# Patient Record
Sex: MALE | Race: OTHER | NOT HISPANIC OR LATINO | URBAN - METROPOLITAN AREA
[De-identification: names, ages, dates, MRNs, and addresses within clinical notes are randomized per-mention and may not be internally consistent; named-entity substitution may affect disease eponyms.]

---

## 2019-09-22 ENCOUNTER — INPATIENT (INPATIENT)
Facility: HOSPITAL | Age: 54
LOS: 0 days | Discharge: ANOTHER HOSPITAL | End: 2019-09-23
Attending: INTERNAL MEDICINE | Admitting: INTERNAL MEDICINE
Payer: MEDICAID

## 2019-09-22 VITALS
RESPIRATION RATE: 18 BRPM | WEIGHT: 244.93 LBS | SYSTOLIC BLOOD PRESSURE: 145 MMHG | HEART RATE: 120 BPM | HEIGHT: 67 IN | TEMPERATURE: 99 F | DIASTOLIC BLOOD PRESSURE: 85 MMHG

## 2019-09-22 LAB
ALBUMIN SERPL ELPH-MCNC: 3.5 G/DL — SIGNIFICANT CHANGE UP (ref 3.3–5)
ALP SERPL-CCNC: 51 U/L — SIGNIFICANT CHANGE UP (ref 40–120)
ALT FLD-CCNC: 21 U/L — SIGNIFICANT CHANGE UP (ref 12–78)
ANION GAP SERPL CALC-SCNC: 10 MMOL/L — SIGNIFICANT CHANGE UP (ref 5–17)
APTT BLD: 27.4 SEC — LOW (ref 28.5–37)
AST SERPL-CCNC: 14 U/L — LOW (ref 15–37)
BILIRUB SERPL-MCNC: 0.9 MG/DL — SIGNIFICANT CHANGE UP (ref 0.2–1.2)
BUN SERPL-MCNC: 18 MG/DL — SIGNIFICANT CHANGE UP (ref 7–23)
CALCIUM SERPL-MCNC: 8.8 MG/DL — SIGNIFICANT CHANGE UP (ref 8.5–10.1)
CHLORIDE SERPL-SCNC: 102 MMOL/L — SIGNIFICANT CHANGE UP (ref 96–108)
CO2 SERPL-SCNC: 26 MMOL/L — SIGNIFICANT CHANGE UP (ref 22–31)
CREAT SERPL-MCNC: 1.04 MG/DL — SIGNIFICANT CHANGE UP (ref 0.5–1.3)
ERYTHROCYTE [SEDIMENTATION RATE] IN BLOOD: 14 MM/HR — SIGNIFICANT CHANGE UP (ref 0–20)
GLUCOSE SERPL-MCNC: 281 MG/DL — HIGH (ref 70–99)
HCT VFR BLD CALC: 40.9 % — SIGNIFICANT CHANGE UP (ref 39–50)
HGB BLD-MCNC: 13.8 G/DL — SIGNIFICANT CHANGE UP (ref 13–17)
INR BLD: 1.22 RATIO — HIGH (ref 0.88–1.16)
LACTATE SERPL-SCNC: 2.5 MMOL/L — HIGH (ref 0.7–2)
MCHC RBC-ENTMCNC: 25.9 PG — LOW (ref 27–34)
MCHC RBC-ENTMCNC: 33.7 GM/DL — SIGNIFICANT CHANGE UP (ref 32–36)
MCV RBC AUTO: 76.7 FL — LOW (ref 80–100)
NRBC # BLD: 0 /100 WBCS — SIGNIFICANT CHANGE UP (ref 0–0)
PLATELET # BLD AUTO: 188 K/UL — SIGNIFICANT CHANGE UP (ref 150–400)
POTASSIUM SERPL-MCNC: 3.8 MMOL/L — SIGNIFICANT CHANGE UP (ref 3.5–5.3)
POTASSIUM SERPL-SCNC: 3.8 MMOL/L — SIGNIFICANT CHANGE UP (ref 3.5–5.3)
PROT SERPL-MCNC: 7.6 GM/DL — SIGNIFICANT CHANGE UP (ref 6–8.3)
PROTHROM AB SERPL-ACNC: 13.8 SEC — HIGH (ref 10–12.9)
RBC # BLD: 5.33 M/UL — SIGNIFICANT CHANGE UP (ref 4.2–5.8)
RBC # FLD: 15.4 % — HIGH (ref 10.3–14.5)
SODIUM SERPL-SCNC: 138 MMOL/L — SIGNIFICANT CHANGE UP (ref 135–145)
WBC # BLD: 18.05 K/UL — HIGH (ref 3.8–10.5)
WBC # FLD AUTO: 18.05 K/UL — HIGH (ref 3.8–10.5)

## 2019-09-22 PROCEDURE — 70491 CT SOFT TISSUE NECK W/DYE: CPT | Mod: 26

## 2019-09-22 PROCEDURE — 93010 ELECTROCARDIOGRAM REPORT: CPT

## 2019-09-22 PROCEDURE — 99285 EMERGENCY DEPT VISIT HI MDM: CPT

## 2019-09-22 PROCEDURE — 71045 X-RAY EXAM CHEST 1 VIEW: CPT | Mod: 26

## 2019-09-22 PROCEDURE — 70487 CT MAXILLOFACIAL W/DYE: CPT | Mod: 26

## 2019-09-22 RX ORDER — ACETAMINOPHEN 500 MG
975 TABLET ORAL ONCE
Refills: 0 | Status: COMPLETED | OUTPATIENT
Start: 2019-09-22 | End: 2019-09-22

## 2019-09-22 RX ORDER — AMPICILLIN SODIUM AND SULBACTAM SODIUM 250; 125 MG/ML; MG/ML
3 INJECTION, POWDER, FOR SUSPENSION INTRAMUSCULAR; INTRAVENOUS ONCE
Refills: 0 | Status: COMPLETED | OUTPATIENT
Start: 2019-09-22 | End: 2019-09-22

## 2019-09-22 RX ORDER — SODIUM CHLORIDE 9 MG/ML
1000 INJECTION INTRAMUSCULAR; INTRAVENOUS; SUBCUTANEOUS ONCE
Refills: 0 | Status: COMPLETED | OUTPATIENT
Start: 2019-09-22 | End: 2019-09-22

## 2019-09-22 RX ORDER — MORPHINE SULFATE 50 MG/1
4 CAPSULE, EXTENDED RELEASE ORAL ONCE
Refills: 0 | Status: DISCONTINUED | OUTPATIENT
Start: 2019-09-22 | End: 2019-09-22

## 2019-09-22 RX ADMIN — SODIUM CHLORIDE 1000 MILLILITER(S): 9 INJECTION INTRAMUSCULAR; INTRAVENOUS; SUBCUTANEOUS at 19:46

## 2019-09-22 RX ADMIN — MORPHINE SULFATE 4 MILLIGRAM(S): 50 CAPSULE, EXTENDED RELEASE ORAL at 21:50

## 2019-09-22 RX ADMIN — AMPICILLIN SODIUM AND SULBACTAM SODIUM 3 GRAM(S): 250; 125 INJECTION, POWDER, FOR SUSPENSION INTRAMUSCULAR; INTRAVENOUS at 23:55

## 2019-09-22 RX ADMIN — AMPICILLIN SODIUM AND SULBACTAM SODIUM 200 GRAM(S): 250; 125 INJECTION, POWDER, FOR SUSPENSION INTRAMUSCULAR; INTRAVENOUS at 21:50

## 2019-09-22 RX ADMIN — MORPHINE SULFATE 4 MILLIGRAM(S): 50 CAPSULE, EXTENDED RELEASE ORAL at 22:10

## 2019-09-22 RX ADMIN — Medication 975 MILLIGRAM(S): at 19:46

## 2019-09-22 RX ADMIN — Medication 975 MILLIGRAM(S): at 20:40

## 2019-09-22 RX ADMIN — SODIUM CHLORIDE 1000 MILLILITER(S): 9 INJECTION INTRAMUSCULAR; INTRAVENOUS; SUBCUTANEOUS at 21:50

## 2019-09-22 NOTE — ED ADULT TRIAGE NOTE - CHIEF COMPLAINT QUOTE
pt complaining of swelling to right side of throat and swelling since last night. pain radiating up right side of face. history of dm.

## 2019-09-22 NOTE — ED PROVIDER NOTE - PROGRESS NOTE DETAILS
pt. feel much better after meds, pain improved  peritonsillar abscess and cellulitis of neck noted on CT  will admit for continuation of IV antibiotics and IV pain control

## 2019-09-22 NOTE — ED PROVIDER NOTE - OBJECTIVE STATEMENT
53 yo M with R throat pain since last night, + fever.  Pt. has painful swallowing.  R side of throat and neck is swollen.  No inciting event/trauma.  No sick contacts.  No other complaints.  Pt. is breathing without issue.  ROS: negative for fever, cough, headache, chest pain, shortness of breath, abd pain, nausea, vomiting, diarrhea, rash, paresthesia, and weakness--all other systems reviewed are negative.   PMH: NIDDM; Meds: metformin; SH: Denies smoking/drinking/drug use

## 2019-09-22 NOTE — ED PROVIDER NOTE - CLINICAL SUMMARY MEDICAL DECISION MAKING FREE TEXT BOX
55 yo M with throat pain in adult, likely infections, ?soft tissue infection  -basic labs, coags, esr, crp, lactate, ebv serology, CT soft tissue neck and maxillo + contrast, cxr, ekg, IV, ns hydration, Unasyn, Tylenol/morphine prn  -f/u results, reeval

## 2019-09-22 NOTE — ED PROVIDER NOTE - PHYSICAL EXAMINATION
Vitals: tachy at 120, otherwise WNL  Gen: AAOx3, NAD, sitting comfortably in stretcher  ENT: patent airway   Head: ncat, perrla, eomi b/l, gross swelling of R neck under R jaw and R facial area, ttp+, no crepitus or woody induration of skin, mild erythema of area (15x20 cm area grossly)  Neck: supple, no lymphadenopathy, no midline deviation  Heart: rrr, no m/r/g  Lungs: CTA b/l, no rales/ronchi/wheezes  Abd: soft, nontender, non-distended, no rebound or guarding  Ext: no clubbing/cyanosis/edema  Neuro: sensation and muscle strength intact b/l, steady gait, CN2-12 intact b/l

## 2019-09-22 NOTE — ED PROVIDER NOTE - CARE PLAN
Principal Discharge DX:	Throat pain in adult Principal Discharge DX:	Cellulitis of neck  Secondary Diagnosis:	Peritonsillar abscess

## 2019-09-23 ENCOUNTER — INPATIENT (INPATIENT)
Facility: HOSPITAL | Age: 54
LOS: 9 days | Discharge: HOME HEALTH | End: 2019-10-03
Attending: INTERNAL MEDICINE | Admitting: INTERNAL MEDICINE
Payer: MEDICAID

## 2019-09-23 ENCOUNTER — TRANSCRIPTION ENCOUNTER (OUTPATIENT)
Age: 54
End: 2019-09-23

## 2019-09-23 VITALS
OXYGEN SATURATION: 96 % | DIASTOLIC BLOOD PRESSURE: 74 MMHG | SYSTOLIC BLOOD PRESSURE: 116 MMHG | RESPIRATION RATE: 21 BRPM | HEART RATE: 103 BPM | TEMPERATURE: 100 F

## 2019-09-23 VITALS
OXYGEN SATURATION: 93 % | RESPIRATION RATE: 18 BRPM | DIASTOLIC BLOOD PRESSURE: 74 MMHG | HEART RATE: 114 BPM | SYSTOLIC BLOOD PRESSURE: 111 MMHG | TEMPERATURE: 98 F

## 2019-09-23 DIAGNOSIS — L03.221 CELLULITIS OF NECK: ICD-10-CM

## 2019-09-23 DIAGNOSIS — Z29.9 ENCOUNTER FOR PROPHYLACTIC MEASURES, UNSPECIFIED: ICD-10-CM

## 2019-09-23 DIAGNOSIS — E11.9 TYPE 2 DIABETES MELLITUS WITHOUT COMPLICATIONS: ICD-10-CM

## 2019-09-23 DIAGNOSIS — J36 PERITONSILLAR ABSCESS: ICD-10-CM

## 2019-09-23 LAB
ALBUMIN SERPL ELPH-MCNC: 3.5 G/DL — SIGNIFICANT CHANGE UP (ref 3.3–5)
ALP SERPL-CCNC: 36 U/L — LOW (ref 40–120)
ALT FLD-CCNC: 12 U/L — SIGNIFICANT CHANGE UP (ref 4–41)
ANION GAP SERPL CALC-SCNC: 18 MMO/L — HIGH (ref 7–14)
ANISOCYTOSIS BLD QL: SLIGHT — SIGNIFICANT CHANGE UP
AST SERPL-CCNC: 14 U/L — SIGNIFICANT CHANGE UP (ref 4–40)
BASOPHILS # BLD AUTO: 0.06 K/UL — SIGNIFICANT CHANGE UP (ref 0–0.2)
BASOPHILS NFR BLD AUTO: 0.3 % — SIGNIFICANT CHANGE UP (ref 0–2)
BASOPHILS NFR SPEC: 0 % — SIGNIFICANT CHANGE UP (ref 0–2)
BILIRUB SERPL-MCNC: 0.8 MG/DL — SIGNIFICANT CHANGE UP (ref 0.2–1.2)
BLASTS # FLD: 0 % — SIGNIFICANT CHANGE UP (ref 0–0)
BUN SERPL-MCNC: 19 MG/DL — SIGNIFICANT CHANGE UP (ref 7–23)
CALCIUM SERPL-MCNC: 9 MG/DL — SIGNIFICANT CHANGE UP (ref 8.4–10.5)
CHLORIDE SERPL-SCNC: 101 MMOL/L — SIGNIFICANT CHANGE UP (ref 98–107)
CO2 SERPL-SCNC: 17 MMOL/L — LOW (ref 22–31)
CREAT SERPL-MCNC: 0.64 MG/DL — SIGNIFICANT CHANGE UP (ref 0.5–1.3)
CRP SERPL-MCNC: 10.73 MG/DL — HIGH (ref 0–0.4)
EBV EA AB SER IA-ACNC: 6.6 U/ML — SIGNIFICANT CHANGE UP
EBV EA AB TITR SER IF: POSITIVE
EBV EA IGG SER-ACNC: NEGATIVE — SIGNIFICANT CHANGE UP
EBV NA IGG SER IA-ACNC: 172 U/ML — HIGH
EBV PATRN SPEC IB-IMP: SIGNIFICANT CHANGE UP
EBV VCA IGG AVIDITY SER QL IA: POSITIVE
EBV VCA IGM SER IA-ACNC: 34.2 U/ML — HIGH
EBV VCA IGM SER IA-ACNC: <10 U/ML — SIGNIFICANT CHANGE UP
EBV VCA IGM TITR FLD: NEGATIVE — SIGNIFICANT CHANGE UP
EOSINOPHIL # BLD AUTO: 5.74 K/UL — HIGH (ref 0–0.5)
EOSINOPHIL NFR BLD AUTO: 30.3 % — HIGH (ref 0–6)
EOSINOPHIL NFR FLD: 0 % — SIGNIFICANT CHANGE UP (ref 0–6)
GLUCOSE SERPL-MCNC: 274 MG/DL — HIGH (ref 70–99)
HCT VFR BLD CALC: 38.7 % — LOW (ref 39–50)
HCV AB S/CO SERPL IA: 0.21 S/CO — SIGNIFICANT CHANGE UP (ref 0–0.99)
HCV AB SERPL-IMP: SIGNIFICANT CHANGE UP
HGB BLD-MCNC: 12.7 G/DL — LOW (ref 13–17)
HIV 1+2 AB+HIV1 P24 AG SERPL QL IA: SIGNIFICANT CHANGE UP
HYPOCHROMIA BLD QL: SLIGHT — SIGNIFICANT CHANGE UP
IMM GRANULOCYTES NFR BLD AUTO: 3.7 % — HIGH (ref 0–1.5)
LACTATE SERPL-SCNC: 1.9 MMOL/L — SIGNIFICANT CHANGE UP (ref 0.7–2)
LYMPHOCYTES # BLD AUTO: 1.3 K/UL — SIGNIFICANT CHANGE UP (ref 1–3.3)
LYMPHOCYTES # BLD AUTO: 6.9 % — LOW (ref 13–44)
LYMPHOCYTES NFR SPEC AUTO: 3.5 % — LOW (ref 13–44)
MCHC RBC-ENTMCNC: 25.1 PG — LOW (ref 27–34)
MCHC RBC-ENTMCNC: 32.8 % — SIGNIFICANT CHANGE UP (ref 32–36)
MCV RBC AUTO: 76.6 FL — LOW (ref 80–100)
METAMYELOCYTES # FLD: 3.5 % — HIGH (ref 0–1)
MICROCYTES BLD QL: SLIGHT — SIGNIFICANT CHANGE UP
MONOCYTES # BLD AUTO: 1.25 K/UL — HIGH (ref 0–0.9)
MONOCYTES NFR BLD AUTO: 6.6 % — SIGNIFICANT CHANGE UP (ref 2–14)
MONOCYTES NFR BLD: 5.2 % — SIGNIFICANT CHANGE UP (ref 2–9)
MYELOCYTES NFR BLD: 0 % — SIGNIFICANT CHANGE UP (ref 0–0)
NEUTROPHIL AB SER-ACNC: 76.5 % — SIGNIFICANT CHANGE UP (ref 43–77)
NEUTROPHILS # BLD AUTO: 9.9 K/UL — HIGH (ref 1.8–7.4)
NEUTROPHILS NFR BLD AUTO: 52.2 % — SIGNIFICANT CHANGE UP (ref 43–77)
NEUTS BAND # BLD: 9.6 % — HIGH (ref 0–6)
NRBC # FLD: 0 K/UL — SIGNIFICANT CHANGE UP (ref 0–0)
OTHER - HEMATOLOGY %: 0 — SIGNIFICANT CHANGE UP
PLATELET # BLD AUTO: 169 K/UL — SIGNIFICANT CHANGE UP (ref 150–400)
PLATELET COUNT - ESTIMATE: NORMAL — SIGNIFICANT CHANGE UP
PMV BLD: 10.8 FL — SIGNIFICANT CHANGE UP (ref 7–13)
POLYCHROMASIA BLD QL SMEAR: SLIGHT — SIGNIFICANT CHANGE UP
POTASSIUM SERPL-MCNC: 4.3 MMOL/L — SIGNIFICANT CHANGE UP (ref 3.5–5.3)
POTASSIUM SERPL-SCNC: 4.3 MMOL/L — SIGNIFICANT CHANGE UP (ref 3.5–5.3)
PROMYELOCYTES # FLD: 0 % — SIGNIFICANT CHANGE UP (ref 0–0)
PROT SERPL-MCNC: 7.1 G/DL — SIGNIFICANT CHANGE UP (ref 6–8.3)
RBC # BLD: 5.05 M/UL — SIGNIFICANT CHANGE UP (ref 4.2–5.8)
RBC # FLD: 15.5 % — HIGH (ref 10.3–14.5)
SODIUM SERPL-SCNC: 136 MMOL/L — SIGNIFICANT CHANGE UP (ref 135–145)
VARIANT LYMPHS # BLD: 1.7 % — SIGNIFICANT CHANGE UP
WBC # BLD: 18.95 K/UL — HIGH (ref 3.8–10.5)
WBC # FLD AUTO: 18.95 K/UL — HIGH (ref 3.8–10.5)

## 2019-09-23 PROCEDURE — 99223 1ST HOSP IP/OBS HIGH 75: CPT | Mod: AI

## 2019-09-23 PROCEDURE — 99238 HOSP IP/OBS DSCHRG MGMT 30/<: CPT

## 2019-09-23 PROCEDURE — 99221 1ST HOSP IP/OBS SF/LOW 40: CPT

## 2019-09-23 RX ORDER — DEXAMETHASONE 0.5 MG/5ML
2 ELIXIR ORAL EVERY 6 HOURS
Refills: 0 | Status: DISCONTINUED | OUTPATIENT
Start: 2019-09-23 | End: 2019-09-23

## 2019-09-23 RX ORDER — MORPHINE SULFATE 50 MG/1
4 CAPSULE, EXTENDED RELEASE ORAL EVERY 4 HOURS
Refills: 0 | Status: DISCONTINUED | OUTPATIENT
Start: 2019-09-23 | End: 2019-09-23

## 2019-09-23 RX ORDER — SODIUM CHLORIDE 9 MG/ML
1000 INJECTION INTRAMUSCULAR; INTRAVENOUS; SUBCUTANEOUS ONCE
Refills: 0 | Status: COMPLETED | OUTPATIENT
Start: 2019-09-23 | End: 2019-09-23

## 2019-09-23 RX ORDER — AMPICILLIN SODIUM AND SULBACTAM SODIUM 250; 125 MG/ML; MG/ML
3 INJECTION, POWDER, FOR SUSPENSION INTRAMUSCULAR; INTRAVENOUS EVERY 6 HOURS
Refills: 0 | Status: DISCONTINUED | OUTPATIENT
Start: 2019-09-23 | End: 2019-09-23

## 2019-09-23 RX ORDER — AMPICILLIN SODIUM AND SULBACTAM SODIUM 250; 125 MG/ML; MG/ML
3 INJECTION, POWDER, FOR SUSPENSION INTRAMUSCULAR; INTRAVENOUS ONCE
Refills: 0 | Status: COMPLETED | OUTPATIENT
Start: 2019-09-23 | End: 2019-09-23

## 2019-09-23 RX ORDER — DEXAMETHASONE 0.5 MG/5ML
10 ELIXIR ORAL ONCE
Refills: 0 | Status: COMPLETED | OUTPATIENT
Start: 2019-09-24 | End: 2019-09-24

## 2019-09-23 RX ORDER — DEXAMETHASONE 0.5 MG/5ML
4 ELIXIR ORAL ONCE
Refills: 0 | Status: DISCONTINUED | OUTPATIENT
Start: 2019-09-23 | End: 2019-09-23

## 2019-09-23 RX ORDER — MORPHINE SULFATE 50 MG/1
4 CAPSULE, EXTENDED RELEASE ORAL ONCE
Refills: 0 | Status: DISCONTINUED | OUTPATIENT
Start: 2019-09-23 | End: 2019-09-23

## 2019-09-23 RX ORDER — DEXAMETHASONE 0.5 MG/5ML
6 ELIXIR ORAL ONCE
Refills: 0 | Status: COMPLETED | OUTPATIENT
Start: 2019-09-23 | End: 2019-09-23

## 2019-09-23 RX ORDER — INFLUENZA VIRUS VACCINE 15; 15; 15; 15 UG/.5ML; UG/.5ML; UG/.5ML; UG/.5ML
0.5 SUSPENSION INTRAMUSCULAR ONCE
Refills: 0 | Status: DISCONTINUED | OUTPATIENT
Start: 2019-09-23 | End: 2019-09-23

## 2019-09-23 RX ORDER — VANCOMYCIN HCL 1 G
1000 VIAL (EA) INTRAVENOUS EVERY 8 HOURS
Refills: 0 | Status: DISCONTINUED | OUTPATIENT
Start: 2019-09-23 | End: 2019-09-23

## 2019-09-23 RX ORDER — DEXAMETHASONE 0.5 MG/5ML
4 ELIXIR ORAL ONCE
Refills: 0 | Status: COMPLETED | OUTPATIENT
Start: 2019-09-23 | End: 2019-09-23

## 2019-09-23 RX ORDER — CHLORHEXIDINE GLUCONATE 213 G/1000ML
15 SOLUTION TOPICAL
Refills: 0 | Status: DISCONTINUED | OUTPATIENT
Start: 2019-09-23 | End: 2019-09-23

## 2019-09-23 RX ADMIN — MORPHINE SULFATE 4 MILLIGRAM(S): 50 CAPSULE, EXTENDED RELEASE ORAL at 16:05

## 2019-09-23 RX ADMIN — SODIUM CHLORIDE 1000 MILLILITER(S): 9 INJECTION INTRAMUSCULAR; INTRAVENOUS; SUBCUTANEOUS at 18:27

## 2019-09-23 RX ADMIN — SODIUM CHLORIDE 1000 MILLILITER(S): 9 INJECTION INTRAMUSCULAR; INTRAVENOUS; SUBCUTANEOUS at 23:13

## 2019-09-23 RX ADMIN — AMPICILLIN SODIUM AND SULBACTAM SODIUM 200 GRAM(S): 250; 125 INJECTION, POWDER, FOR SUSPENSION INTRAMUSCULAR; INTRAVENOUS at 11:02

## 2019-09-23 RX ADMIN — CHLORHEXIDINE GLUCONATE 15 MILLILITER(S): 213 SOLUTION TOPICAL at 06:32

## 2019-09-23 RX ADMIN — AMPICILLIN SODIUM AND SULBACTAM SODIUM 200 GRAM(S): 250; 125 INJECTION, POWDER, FOR SUSPENSION INTRAMUSCULAR; INTRAVENOUS at 16:37

## 2019-09-23 RX ADMIN — SODIUM CHLORIDE 1000 MILLILITER(S): 9 INJECTION INTRAMUSCULAR; INTRAVENOUS; SUBCUTANEOUS at 18:52

## 2019-09-23 RX ADMIN — MORPHINE SULFATE 4 MILLIGRAM(S): 50 CAPSULE, EXTENDED RELEASE ORAL at 09:20

## 2019-09-23 RX ADMIN — Medication 100 MILLIGRAM(S): at 21:49

## 2019-09-23 RX ADMIN — Medication 4 MILLIGRAM(S): at 12:19

## 2019-09-23 RX ADMIN — AMPICILLIN SODIUM AND SULBACTAM SODIUM 200 GRAM(S): 250; 125 INJECTION, POWDER, FOR SUSPENSION INTRAMUSCULAR; INTRAVENOUS at 06:32

## 2019-09-23 RX ADMIN — Medication 900 MILLIGRAM(S): at 22:30

## 2019-09-23 RX ADMIN — MORPHINE SULFATE 4 MILLIGRAM(S): 50 CAPSULE, EXTENDED RELEASE ORAL at 12:19

## 2019-09-23 RX ADMIN — MORPHINE SULFATE 4 MILLIGRAM(S): 50 CAPSULE, EXTENDED RELEASE ORAL at 18:52

## 2019-09-23 RX ADMIN — Medication 6 MILLIGRAM(S): at 16:30

## 2019-09-23 RX ADMIN — MORPHINE SULFATE 4 MILLIGRAM(S): 50 CAPSULE, EXTENDED RELEASE ORAL at 08:51

## 2019-09-23 RX ADMIN — MORPHINE SULFATE 4 MILLIGRAM(S): 50 CAPSULE, EXTENDED RELEASE ORAL at 01:55

## 2019-09-23 RX ADMIN — MORPHINE SULFATE 4 MILLIGRAM(S): 50 CAPSULE, EXTENDED RELEASE ORAL at 23:24

## 2019-09-23 NOTE — H&P ADULT - NSHPPHYSICALEXAM_GEN_ALL_CORE
heent swelling to right check / neck /tmj region /skin is warm and tender to palpation   neck +swelling +tenderness to palpation   mouth pharynx poorly visualized, mucosa moist   lungs clear   cvs s1s2 rr no mrg  abd -soft bs+ nt, nd   ext dp/pt pulses 2+ b/l , no edema     IMPROVE VTE Individual Risk Assessment    RISK                                                          Points  [] Previous VTE                                           3  [] Thrombophilia                                        2  [] Lower limb paralysis                              2   [] Current Cancer                                       2   [] Immobilization > 24 hrs                        1  [] ICU/CCU stay > 24 hours                       1  [] Age > 60                                                   1    IMPROVE VTE Score:0

## 2019-09-23 NOTE — CONSULT NOTE ADULT - ASSESSMENT
54 yr old male seen with   1.sepsis :sec to peritonsillar abscess   as peritonsillar swelling is worsening fast inspite of antibiotics pt needs stat ENT for a possible drainage of abscess  -cont vanco and unasyn  -add decadro stat  -follow up on blood c/s   - follow up on wbc and temp curve  -CT reviewed(Bone resorption in the alveolar ridge of the mandible on the left   side is likely related to periodontal disease.    CT neck:  1.  Severe right-sided pharyngitis with inflammatory change tracking to   the right submandibular space and along the right sternocleidomastoid   muscle.  Overlying cellulitis in the right cheek.  2.  Right peritonsillar abscess measures 1.8 x 1 cm.  This is located   along the anterior-superior-lateral margin of the right palatine tonsil.  3.  Trace retropharyngeal edema.  No retropharyngeal phlegmon or abscess)  -if not transferred STAT, ICU consult for impending resp failure or distress  being discussed to be transferred to [facility]       2.Peritonsillar abscess :likely sec to odontogenic origin   pt denies any recent gum disease or dental work up

## 2019-09-23 NOTE — ED ADULT TRIAGE NOTE - CHIEF COMPLAINT QUOTE
pt BIBA as a transfer from [facility], opt sent to ED for ENT consult.  pt has a peritonsilar abscess.  c/o difficulty swallowing, pt is able to swallow secretions.

## 2019-09-23 NOTE — ED CDU PROVIDER INITIAL DAY NOTE - OBJECTIVE STATEMENT
53 y.o male pmhx of DM with 1 day of painful swallowing/ throat pain transferred from [place] for ENT eval- found to have PTA on CT sent here for ENT. Pt is S/p I&D. Sent to the CDU for Iv abx, decadron, warm compresses, fluids, advance diet, reassessments. Pt reports improvement in symptoms s/p I&D. Denies fever, chills, trouble breathing, sob , chest pain. tolerating secretions.

## 2019-09-23 NOTE — H&P ADULT - NSHPLABSRESULTS_GEN_ALL_CORE
IMPRESSION:   CT maxillofacial:  1.  Mild diffuse inflammatory mucosal disease in the paranasal sinuses.    No air-fluid level to indicate acute.  2.  Bone resorption in the alveolar ridge of the mandible on the left   side is likely related to periodontal disease.    CT neck:  1.  Severe right-sided pharyngitis with inflammatory change tracking to   the right submandibular space and along the right sternocleidomastoid   muscle.  Overlying cellulitis in the right cheek.  2.  Right peritonsillar abscess measures 1.8 x 1 cm.  This is located   along the anterior-superior-lateral margin of the right palatine tonsil.  3.  Trace retropharyngeal edema.  No retropharyngeal phlegmon or abscess. IMPRESSION:   CT maxillofacial:  1.  Mild diffuse inflammatory mucosal disease in the paranasal sinuses.    No air-fluid level to indicate acute.  2.  Bone resorption in the alveolar ridge of the mandible on the left   side is likely related to periodontal disease.    CT neck:  1.  Severe right-sided pharyngitis with inflammatory change tracking to   the right submandibular space and along the right sternocleidomastoid   muscle.  Overlying cellulitis in the right cheek.  2.  Right peritonsillar abscess measures 1.8 x 1 cm.  This is located   along the anterior-superior-lateral margin of the right palatine tonsil.  3.  Trace retropharyngeal edema.  No retropharyngeal phlegmon or abscess.    LABS:                        13.8   18.05 )-----------( 188      ( 22 Sep 2019 19:43 )             40.9     09-22    138  |  102  |  18  ----------------------------<  281<H>  3.8   |  26  |  1.04    Ca    8.8      22 Sep 2019 19:43    TPro  7.6  /  Alb  3.5  /  TBili  0.9  /  DBili  x   /  AST  14<L>  /  ALT  21  /  AlkPhos  51  09-22    PT/INR - ( 22 Sep 2019 19:43 )   PT: 13.8 sec;   INR: 1.22 ratio         PTT - ( 22 Sep 2019 19:43 )  PTT:27.4 sec    CAPILLARY BLOOD GLUCOSE          RADIOLOGY & ADDITIONAL TESTS:    Imaging Personally Reviewed:  [ X] YES  [ ] NO

## 2019-09-23 NOTE — ED CDU PROVIDER INITIAL DAY NOTE - ATTENDING CONTRIBUTION TO CARE
I performed a face to face bedside interview with patient regarding history of present illness, review of symptoms and past medical history. I completed an independent physical exam.  I have discussed patient's plan of care with PA.   I agree with note as stated above, having amended the EMR as needed to reflect my findings. I have discussed the assessment and plan of care.  This includes during the time I functioned as the attending physician for this patient.     Attending Exam - Dr. Gunter: GEN: well appearing, NAD  HEENT: +PERRL, EOMI R sided submandibular swelling no stridor, no voice change, no difficulty with secretions. RESP: CTAB, no signs of respiratory distress CV: s1s2 RRR ABD: soft/non tender/non distended  MSK: no deformities / swelling, normal range of motion, spine grossly normal NEURO: alert, non focal exam SKIN: normal color / temperature / condition.

## 2019-09-23 NOTE — ED CLERICAL - NS ED CLERK NOTE PRE-ARRIVAL INFORMATION; ADDITIONAL PRE-ARRIVAL INFORMATION
pt transferred from [place]  hosp has peritonsil abscess was given vanco  unasyn and morphine and decadron pt needs ent  call ent resident

## 2019-09-23 NOTE — H&P ADULT - NSHPREVIEWOFSYSTEMS_GEN_ALL_CORE
right face pain swelling  dysphagia  no dyspnea  no cough   +fever /chills  no n/v/d  no dysuria  no polyuria   no rhinitis  no runny nose

## 2019-09-23 NOTE — H&P ADULT - HISTORY OF PRESENT ILLNESS
53 yo male pmh of dm on oral agents ,while attending  last night pt swallowed water from someone else bottle , in next 2-3 hours pt noted swelling tenderness over right neck , this am swelling pain worse with dysphagia fever/chills no cough no rhinitis  , no dyspnea, no dysuria , no swallowing bone noted by patient , no trauma to region . ct neck soft tissue severe right pharyngitis  1.8 x 1 right peritonsillar abscess with extensive edema tracking into adjacent tissues .  The epiglottis, larynx and infraglottic airway appear within normal limits . pt c/o severe pain with swallowing movement fo jaw muscles and at rest ,pain modulated with morphine 4 mg in er

## 2019-09-23 NOTE — H&P ADULT - ASSESSMENT
peritonsillar abscess -continue unasyn  morphine q 4 ho[u[rs for pain control  clear liquid diet pending improvement  telemetry    spirometry monitoring for air way patency   chlorhexidine mouth rinse       diabetes on metformin 500 bid at home  ck [id number]  finger stick ac/hs aiss low scale

## 2019-09-23 NOTE — PROGRESS NOTE ADULT - SUBJECTIVE AND OBJECTIVE BOX
BRIEF NOTE:    CDU called ENT, patient unable to tolerate liquids. States he is unable to swallow, feels water will not go down. Unclear if secondary to pain. Given significant submandibular swelling, airway evaluated with bedside scope    FOE:   NP wnl  OP crowding R>L  BOT/vallecula with pooling of secretions   Epiglottis sharp  AE folds nonedematous  Arytenoids mobile  Vocal folds mobile bilaterally  No masses or lesions visualized in post cricoid space or pyriform sinuses bilaterally  Significant pooling of secretions in hypopharynx      A/P: 54M with hx of DM with R PTA s/p I&D and submandibular gland swelling, possible reactive.   - Admit to CDU   - Decadron 10mg x 3 doses  - Massage of R SMG from posterior to anterior q4 hours  - Aggressive PO vs IV hydration  - Warm compresses to R SMG q 2-4 hours   - Sialogogues   - Strict glucose control  - Clindamycin TID   - Warm salt water rinses TID after meals  - Diet as tolerated  - d/w Dr. Palomo

## 2019-09-23 NOTE — ED ADULT NURSE NOTE - CHIEF COMPLAINT QUOTE
pt BIBA as a transfer from [facility], opt sent to ED for ENT consult.  pt has a peritonsilar abscess.  c/o difficulty swallowing, pt is able to swallow secretions.

## 2019-09-23 NOTE — DISCHARGE NOTE PROVIDER - NSDCCPCAREPLAN_GEN_ALL_CORE_FT
PRINCIPAL DISCHARGE DIAGNOSIS  Diagnosis: Cellulitis of neck  Assessment and Plan of Treatment:       SECONDARY DISCHARGE DIAGNOSES  Diagnosis: Peritonsillar abscess  Assessment and Plan of Treatment:

## 2019-09-23 NOTE — CONSULT NOTE ADULT - SUBJECTIVE AND OBJECTIVE BOX
Infectious Diseases - Attending at Dr. Marion    HPI:  53 yo male pmh of dm on oral agents ,while attending  last night pt swallowed water from someone else bottle , in next 2-3 hours pt noted swelling tenderness over right neck , this am swelling pain worse with dysphagia fever/chills no cough no rhinitis  , no dyspnea, no dysuria , no swallowing bone noted by patient , no trauma to region . ct neck soft tissue severe right pharyngitis  1.8 x 1 right peritonsillar abscess with extensive edema tracking into adjacent tissues .  The epiglottis, larynx and infraglottic airway appear within normal limits. Pt c/o severe pain with swallowing movement fo jaw muscles and at rest, pain modulated with morphine 4 mg in er (23 Sep 2019 01:07)   Right peritonsillar swelling significantly worsened  in the hospital .Pt cant swallow the saliva .No respiratory distress or choking sensations as as of now.CT maxillo facial shows peritonsillar abscess      PAST MEDICAL & SURGICAL HISTORY:  Type 2 diabetes mellitus  No significant past surgical history      Allergies    No Known Allergies    Intolerances        FAMILY HISTORY:  FH: type 2 diabetes      SOCIAL HISTORY: non smoker    REVIEW OF SYSTEMS:    Constitutional: No fever, weight loss or fatigue  Eyes: No eye pain, visual disturbances, or discharge  ENT: cant open mouth fully. significant swelling on  right submandibular area   No difficulty hearing, tinnitus, vertigo; No sinus or throat pain  Neck: No pain or stiffness  Respiratory: No cough, wheezing, chills or hemoptysis  Cardiovascular: No chest pain, palpitations, shortness of breath, dizziness or leg swelling  Gastrointestinal: No abdominal or epigastric pain. No nausea, vomiting or hematemesis; No diarrhea or constipation. No melena or hematochezia.  Genitourinary: No dysuria, frequency, hematuria or incontinence  Neurological: No headaches, memory loss, loss of strength, numbness or tremors  Skin: No itching, burning, rashes or lesions       MEDICATIONS  (STANDING):  ampicillin/sulbactam  IVPB 3 Gram(s) IV Intermittent every 6 hours  chlorhexidine 0.12% Liquid 15 milliLiter(s) Swish and Spit two times a day  dexamethasone  Injectable 2 milliGRAM(s) IV Push every 6 hours  influenza   Vaccine 0.5 milliLiter(s) IntraMuscular once  vancomycin  IVPB 1000 milliGRAM(s) IV Intermittent every 8 hours    MEDICATIONS  (PRN):  morphine  - Injectable 4 milliGRAM(s) IV Push every 4 hours PRN Severe Pain (7 - 10)      Vital Signs Last 24 Hrs  T(C): 36.8 (23 Sep 2019 13:08), Max: 37.6 (23 Sep 2019 11:15)  T(F): 98.3 (23 Sep 2019 13:08), Max: 99.6 (23 Sep 2019 11:15)  HR: 114 (23 Sep 2019 13:08) (103 - 120)  BP: 111/74 (23 Sep 2019 13:08) (102/63 - 145/85)  BP(mean): --  RR: 18 (23 Sep 2019 13:08) (18 - 24)  SpO2: 93% (23 Sep 2019 13:08) (93% - 98%)    PHYSICAL EXAM:    Constitutional: NAD, well-groomed, well-developed  HEENT: PERRLA, EOMI, Normal Hearing, MMM  right side facial swelling all over jaw,opens mouth little to do any detailed exam   Neck: No LAD, No JVD  Back: Normal spine flexure, No CVA tenderness  Respiratory: CTAB/L  Cardiovascular: S1 and S2, RRR, no M/G/R  Gastrointestinal: BS+, soft, NT/ND  Extremities: No peripheral edema  Vascular: 2+ peripheral pulses  Neurological: A/O x 3, no focal deficits  Skin: No rashes      LABS:                        13.8   18.05 )-----------( 188      ( 22 Sep 2019 19:43 )             40.9     09-22    138  |  102  |  18  ----------------------------<  281<H>  3.8   |  26  |  1.04    Ca    8.8      22 Sep 2019 19:43    TPro  7.6  /  Alb  3.5  /  TBili  0.9  /  DBili  x   /  AST  14<L>  /  ALT  21  /  AlkPhos  51  -    PT/INR - ( 22 Sep 2019 19:43 )   PT: 13.8 sec;   INR: 1.22 ratio         PTT - ( 22 Sep 2019 19:43 )  PTT:27.4 sec      MICROBIOLOGY:  RECENT CULTURES:        RADIOLOGY & ADDITIONAL STUDIES:    IMPRESSION:   CT maxillofacial:  1.  Mild diffuse inflammatory mucosal disease in the paranasal sinuses.    No air-fluid level to indicate acute.  2.  Bone resorption in the alveolar ridge of the mandible on the left   side is likely related to periodontal disease.    CT neck:  1.  Severe right-sided pharyngitis with inflammatory change tracking to   the right submandibular space and along the right sternocleidomastoid   muscle.  Overlying cellulitis in the right cheek.  2.  Right peritonsillar abscess measures 1.8 x 1 cm.  This is located   along the anterior-superior-lateral margin of the right palatine tonsil.  3.  Trace retropharyngeal edema.  No retropharyngeal phlegmon or abscess.

## 2019-09-23 NOTE — ED PROVIDER NOTE - CLINICAL SUMMARY MEDICAL DECISION MAKING FREE TEXT BOX
Pt p/w peritonsillar abscess diagnosed on CT imaging, significant mandibular swelling, ttp, WBC 18, will require I&D, ENT consult, IVF, abx, decadron  Chelsea Sethi, PGY-3 EM

## 2019-09-23 NOTE — ED CDU PROVIDER INITIAL DAY NOTE - MEDICAL DECISION MAKING DETAILS
54 y.o male pmhx of DM s/p I&D of PTA / submandibular swelling, seen by ENT s/p I&D of PTA and sent to CDU for pain control, hydration, Abx, Decadron, warm compresses, reassessments.

## 2019-09-23 NOTE — H&P ADULT - ATTENDING COMMENTS
pt seen exmined and dw w/pa, pt w/pta, no signs of airway compromise will cont unasyn and add vanco, monitor on tele w/continuous pulse ox as precaution. ENT consult for ? attempt to drain although small. ID consult.  monitor pt closely

## 2019-09-23 NOTE — H&P ADULT - PROBLEM SELECTOR PLAN 1
-continue unasyn  morphine q 4 ho[u[rs for pain control  clear liquid diet pending improvement  telemetry    spirometry monitoring for air way patency   chlorhexidine mouth rinse -continue unasyn  morphine q 4 ho[u[rs for pain control  clear liquid diet pending improvement  telemetry    spirometry monitoring for air way patency   chlorhexidine mouth rinse  ent consult

## 2019-09-23 NOTE — H&P ADULT - PROBLEM SELECTOR PLAN 2
-continue unasyn  morphine q 4 ho[u[rs for pain control  clear liquid diet pending improvement  telemetry    spirometry monitoring for air way patency   chlorhexidine mouth rinse

## 2019-09-23 NOTE — DISCHARGE NOTE PROVIDER - HOSPITAL COURSE
55 yo diabetic male was admitted for a severe right-sided pharyngitis, overlying cellulitis in the right cheek & a right peritonsillar abscess. He was started on Unasyn and Vanc. Shortly after admission he began to complain of inability to swallow. He was given decadron. The transfer center was called and the patient was transferred to the [facility] ED for a STAT ENT consult.         Discharge time: 43 minutes

## 2019-09-23 NOTE — CONSULT NOTE ADULT - ASSESSMENT
A/P: 54M with hx of DM with R PTA s/p I&D and submandibular gland swelling, possible reactive.   - Admit to CDU   - Decadron 10mg x1 dose  - Massage of R SMG  - Aggressive PO vs IV hydration  - Warm compresses to R SMG   - Sialogogues   - Strict glucose control  - Clindamycin TID   - Warm salt water rinses TID after meals  - Diet as tolerated  - d/w Dr. Palomo

## 2019-09-23 NOTE — ED PROVIDER NOTE - PROGRESS NOTE DETAILS
pt was seen by ENT, peritonsillar abscess I&D performed, also was seen by CDU for overnight observation- pt was given a second dose of Unasyn here, would recommend switching to clindamycin given his hx of DM, also had another dose of decadron, can get one more dose of decadron while in the CDU for a total of 3  Chelsea Sethi, PGY-3 EM Patient erroneously placed in Obs status.  Patient never discharged from [facility].  will rectify original disposition note to correct this error.

## 2019-09-23 NOTE — ED PROVIDER NOTE - ATTENDING CONTRIBUTION TO CARE
I performed a face to face bedside interview with patient regarding history of present illness, review of symptoms and past medical history. I completed an independent physical exam.  I have discussed patient's plan of care.   I agree with note as stated above, having amended the EMR as needed to reflect my findings. I have discussed the assessment and plan of care.  This includes during the time I functioned as the attending physician for this patient.  Attending Contribution to Care: agree with plan of resident. Pt p/w peritonsillar abscess diagnosed on CT imaging, significant mandibular swelling, ttp, WBC 18, will require I&D, ENT consult, IVF, abx, decadron  . will be evaluated by ent for drainage. dispo as per ent.

## 2019-09-23 NOTE — ED PROVIDER NOTE - OBJECTIVE STATEMENT
55 yo M hx of DM presents with peritonsillar abscess diagnosed on CT imaging, transferred from [place]. Pt denies any recent dental infection or illness. Reports hoarseness in his voice and difficulty swallowing, also inability to tolerate PO 2/2 pain over the last 24 hours. Was treated as an inpatient at [place] and given IV abx and decadron.

## 2019-09-24 DIAGNOSIS — Z29.9 ENCOUNTER FOR PROPHYLACTIC MEASURES, UNSPECIFIED: ICD-10-CM

## 2019-09-24 DIAGNOSIS — J36 PERITONSILLAR ABSCESS: ICD-10-CM

## 2019-09-24 DIAGNOSIS — A41.9 SEPSIS, UNSPECIFIED ORGANISM: ICD-10-CM

## 2019-09-24 DIAGNOSIS — E87.2 ACIDOSIS: ICD-10-CM

## 2019-09-24 DIAGNOSIS — D64.9 ANEMIA, UNSPECIFIED: ICD-10-CM

## 2019-09-24 DIAGNOSIS — E11.9 TYPE 2 DIABETES MELLITUS WITHOUT COMPLICATIONS: ICD-10-CM

## 2019-09-24 DIAGNOSIS — Z91.89 OTHER SPECIFIED PERSONAL RISK FACTORS, NOT ELSEWHERE CLASSIFIED: ICD-10-CM

## 2019-09-24 LAB
ANION GAP SERPL CALC-SCNC: 24 MMO/L — HIGH (ref 7–14)
BUN SERPL-MCNC: 24 MG/DL — HIGH (ref 7–23)
CALCIUM SERPL-MCNC: 9.3 MG/DL — SIGNIFICANT CHANGE UP (ref 8.4–10.5)
CHLORIDE SERPL-SCNC: 99 MMOL/L — SIGNIFICANT CHANGE UP (ref 98–107)
CO2 SERPL-SCNC: 15 MMOL/L — LOW (ref 22–31)
CREAT SERPL-MCNC: 0.62 MG/DL — SIGNIFICANT CHANGE UP (ref 0.5–1.3)
GLUCOSE SERPL-MCNC: 329 MG/DL — HIGH (ref 70–99)
HCT VFR BLD CALC: 37.6 % — LOW (ref 39–50)
HGB BLD-MCNC: 12.7 G/DL — LOW (ref 13–17)
LACTATE SERPL-SCNC: 3 MMOL/L — HIGH (ref 0.5–2)
MAGNESIUM SERPL-MCNC: 1.9 MG/DL — SIGNIFICANT CHANGE UP (ref 1.6–2.6)
MCHC RBC-ENTMCNC: 25.6 PG — LOW (ref 27–34)
MCHC RBC-ENTMCNC: 33.8 % — SIGNIFICANT CHANGE UP (ref 32–36)
MCV RBC AUTO: 75.8 FL — LOW (ref 80–100)
NRBC # FLD: 0 K/UL — SIGNIFICANT CHANGE UP (ref 0–0)
PHOSPHATE SERPL-MCNC: 1.6 MG/DL — LOW (ref 2.5–4.5)
PLATELET # BLD AUTO: 204 K/UL — SIGNIFICANT CHANGE UP (ref 150–400)
PMV BLD: 11.1 FL — SIGNIFICANT CHANGE UP (ref 7–13)
POTASSIUM SERPL-MCNC: 4.4 MMOL/L — SIGNIFICANT CHANGE UP (ref 3.5–5.3)
POTASSIUM SERPL-SCNC: 4.4 MMOL/L — SIGNIFICANT CHANGE UP (ref 3.5–5.3)
RBC # BLD: 4.96 M/UL — SIGNIFICANT CHANGE UP (ref 4.2–5.8)
RBC # FLD: 15.8 % — HIGH (ref 10.3–14.5)
SODIUM SERPL-SCNC: 138 MMOL/L — SIGNIFICANT CHANGE UP (ref 135–145)
WBC # BLD: 19.6 K/UL — HIGH (ref 3.8–10.5)
WBC # FLD AUTO: 19.6 K/UL — HIGH (ref 3.8–10.5)

## 2019-09-24 PROCEDURE — 99223 1ST HOSP IP/OBS HIGH 75: CPT

## 2019-09-24 RX ORDER — INSULIN GLARGINE 100 [IU]/ML
8 INJECTION, SOLUTION SUBCUTANEOUS AT BEDTIME
Refills: 0 | Status: DISCONTINUED | OUTPATIENT
Start: 2019-09-24 | End: 2019-09-26

## 2019-09-24 RX ORDER — INSULIN LISPRO 100/ML
2 VIAL (ML) SUBCUTANEOUS
Refills: 0 | Status: DISCONTINUED | OUTPATIENT
Start: 2019-09-24 | End: 2019-09-26

## 2019-09-24 RX ORDER — SODIUM,POTASSIUM PHOSPHATES 278-250MG
1 POWDER IN PACKET (EA) ORAL
Refills: 0 | Status: COMPLETED | OUTPATIENT
Start: 2019-09-24 | End: 2019-09-25

## 2019-09-24 RX ORDER — DEXTROSE 50 % IN WATER 50 %
15 SYRINGE (ML) INTRAVENOUS ONCE
Refills: 0 | Status: DISCONTINUED | OUTPATIENT
Start: 2019-09-24 | End: 2019-10-03

## 2019-09-24 RX ORDER — DEXTROSE 50 % IN WATER 50 %
25 SYRINGE (ML) INTRAVENOUS ONCE
Refills: 0 | Status: DISCONTINUED | OUTPATIENT
Start: 2019-09-24 | End: 2019-10-03

## 2019-09-24 RX ORDER — SODIUM CHLORIDE 9 MG/ML
1000 INJECTION, SOLUTION INTRAVENOUS
Refills: 0 | Status: DISCONTINUED | OUTPATIENT
Start: 2019-09-24 | End: 2019-10-03

## 2019-09-24 RX ORDER — INSULIN LISPRO 100/ML
VIAL (ML) SUBCUTANEOUS AT BEDTIME
Refills: 0 | Status: DISCONTINUED | OUTPATIENT
Start: 2019-09-24 | End: 2019-09-27

## 2019-09-24 RX ORDER — METFORMIN HYDROCHLORIDE 850 MG/1
1 TABLET ORAL
Qty: 0 | Refills: 0 | DISCHARGE

## 2019-09-24 RX ORDER — SODIUM CHLORIDE 9 MG/ML
1000 INJECTION INTRAMUSCULAR; INTRAVENOUS; SUBCUTANEOUS
Refills: 0 | Status: DISCONTINUED | OUTPATIENT
Start: 2019-09-24 | End: 2019-09-25

## 2019-09-24 RX ORDER — MORPHINE SULFATE 50 MG/1
4 CAPSULE, EXTENDED RELEASE ORAL EVERY 4 HOURS
Refills: 0 | Status: DISCONTINUED | OUTPATIENT
Start: 2019-09-24 | End: 2019-09-26

## 2019-09-24 RX ORDER — DEXTROSE 50 % IN WATER 50 %
12.5 SYRINGE (ML) INTRAVENOUS ONCE
Refills: 0 | Status: DISCONTINUED | OUTPATIENT
Start: 2019-09-24 | End: 2019-10-03

## 2019-09-24 RX ORDER — INFLUENZA VIRUS VACCINE 15; 15; 15; 15 UG/.5ML; UG/.5ML; UG/.5ML; UG/.5ML
0.5 SUSPENSION INTRAMUSCULAR ONCE
Refills: 0 | Status: COMPLETED | OUTPATIENT
Start: 2019-09-24 | End: 2019-10-03

## 2019-09-24 RX ORDER — KETOROLAC TROMETHAMINE 30 MG/ML
15 SYRINGE (ML) INJECTION ONCE
Refills: 0 | Status: DISCONTINUED | OUTPATIENT
Start: 2019-09-24 | End: 2019-09-24

## 2019-09-24 RX ORDER — DEXAMETHASONE 0.5 MG/5ML
10 ELIXIR ORAL EVERY 8 HOURS
Refills: 0 | Status: COMPLETED | OUTPATIENT
Start: 2019-09-24 | End: 2019-09-25

## 2019-09-24 RX ORDER — GLUCAGON INJECTION, SOLUTION 0.5 MG/.1ML
1 INJECTION, SOLUTION SUBCUTANEOUS ONCE
Refills: 0 | Status: DISCONTINUED | OUTPATIENT
Start: 2019-09-24 | End: 2019-10-03

## 2019-09-24 RX ORDER — ACETAMINOPHEN 500 MG
650 TABLET ORAL EVERY 6 HOURS
Refills: 0 | Status: DISCONTINUED | OUTPATIENT
Start: 2019-09-24 | End: 2019-10-03

## 2019-09-24 RX ORDER — INSULIN LISPRO 100/ML
VIAL (ML) SUBCUTANEOUS
Refills: 0 | Status: DISCONTINUED | OUTPATIENT
Start: 2019-09-24 | End: 2019-09-27

## 2019-09-24 RX ADMIN — MORPHINE SULFATE 4 MILLIGRAM(S): 50 CAPSULE, EXTENDED RELEASE ORAL at 17:43

## 2019-09-24 RX ADMIN — SODIUM CHLORIDE 125 MILLILITER(S): 9 INJECTION INTRAMUSCULAR; INTRAVENOUS; SUBCUTANEOUS at 19:20

## 2019-09-24 RX ADMIN — Medication 100 MILLIGRAM(S): at 06:59

## 2019-09-24 RX ADMIN — INSULIN GLARGINE 8 UNIT(S): 100 INJECTION, SOLUTION SUBCUTANEOUS at 22:20

## 2019-09-24 RX ADMIN — SODIUM CHLORIDE 125 MILLILITER(S): 9 INJECTION INTRAMUSCULAR; INTRAVENOUS; SUBCUTANEOUS at 07:00

## 2019-09-24 RX ADMIN — Medication 102 MILLIGRAM(S): at 21:17

## 2019-09-24 RX ADMIN — Medication 15 MILLIGRAM(S): at 19:35

## 2019-09-24 RX ADMIN — SODIUM CHLORIDE 125 MILLILITER(S): 9 INJECTION INTRAMUSCULAR; INTRAVENOUS; SUBCUTANEOUS at 21:17

## 2019-09-24 RX ADMIN — Medication 100 MILLIGRAM(S): at 15:06

## 2019-09-24 RX ADMIN — Medication 63.75 MILLIMOLE(S): at 15:47

## 2019-09-24 RX ADMIN — Medication 100 MILLIGRAM(S): at 22:13

## 2019-09-24 RX ADMIN — Medication 15 MILLIGRAM(S): at 11:30

## 2019-09-24 RX ADMIN — Medication 1 TABLET(S): at 21:17

## 2019-09-24 RX ADMIN — Medication 4: at 17:43

## 2019-09-24 RX ADMIN — Medication 102 MILLIGRAM(S): at 01:10

## 2019-09-24 RX ADMIN — Medication 15 MILLIGRAM(S): at 11:07

## 2019-09-24 RX ADMIN — Medication 3: at 13:06

## 2019-09-24 RX ADMIN — Medication 2: at 22:13

## 2019-09-24 RX ADMIN — Medication 15 MILLIGRAM(S): at 19:20

## 2019-09-24 RX ADMIN — MORPHINE SULFATE 4 MILLIGRAM(S): 50 CAPSULE, EXTENDED RELEASE ORAL at 18:13

## 2019-09-24 NOTE — PROGRESS NOTE ADULT - SUBJECTIVE AND OBJECTIVE BOX
Patient seen and examined at bedside. No acute events overnight. Still with trouble swallowing and pain in submandibular and submental  area.     T(C): 36.9 (09-24-19 @ 06:50), Max: 37.6 (09-23-19 @ 11:15)  HR: 88 (09-24-19 @ 06:50) (67 - 114)  BP: 159/91 (09-24-19 @ 06:50) (111/74 - 159/91)  RR: 20 (09-24-19 @ 06:50) (15 - 24)  SpO2: 100% (09-24-19 @ 06:50) (93% - 100%)    NAD, awake and alert  No respiratory distress, stridor, or stertor  Voice quality: normal  Face:  Symmetric without masses or lesions  OU: PERRL, EOMI  Nose: nasal cavity clear bilaterally, inferior turbinates normal, mucosa normal without crusting or bleeding  OC/OP: mucosa dry, tongue midline, floor of mouth soft, no masses or lesions, soft palate with right bulging and fluctuance, bilateral tonsils 2-3+ with exudate and white debris.   Neck: Right submandibular gland induration, fullness, right submental fullness, ttp,  no overlying skin changes, no purulence from stensen's or merry's duct bilaterally, R LAD, full neck ROM  CNII-XII intact    A/P:  54M with hx of DM with R PTA s/p I&D and submandibular gland swelling, possible reactive.   - Admit to medicine  - Decadron 10mg x3 doses total   - Massage of R SMG  - Aggressive PO vs IV hydration  - Warm compresses to R SMG   - Sialogogues   - Strict glucose control  - Clindamycin TID   - Warm salt water rinses TID after meals  - Diet as tolerated  - d/w Dr. Palomo

## 2019-09-24 NOTE — H&P ADULT - PROBLEM SELECTOR PLAN 2
gap acidosis in setting of infection, suspect 2/2 inc lactate vs dec po intake  -check lactate and c/w IVF

## 2019-09-24 NOTE — ED CDU PROVIDER SUBSEQUENT DAY NOTE - PHYSICAL EXAMINATION
R submandibular swelling  tolerating secretions R /facial submandibular swelling  tolerating secretions  speaking in full sentences

## 2019-09-24 NOTE — ED CDU PROVIDER SUBSEQUENT DAY NOTE - MEDICAL DECISION MAKING DETAILS
54 y.o male pmhx with PTA s/p I&D and submandibular swelling- Pt re-evaluated by ENT this morning- recommend admission to medicine. Pt stable. no acute distress. denies trouble breathing/sob, chest pain. Will admit for further care.

## 2019-09-24 NOTE — ED CDU PROVIDER SUBSEQUENT DAY NOTE - ATTENDING CONTRIBUTION TO CARE
Pt re-evaluated by ENT this morning- recommend admission to medicine. Pt stable. no acute distress. denies trouble breathing/sob, chest pain. ED Attending (Dr Awad): I have personally performed a face to face bedside history and physical examination of this patient.  I have discussed the case with the PA and  I have personally authored the following components: HPI, ROS, Physical Exam and MDM in addition to reviewing and revising the rest of the Provider Note. 54 y.o male pmhx with PTA s/p I&D and submandibular swelling- Pt re-evaluated by ENT this morning- recommend admission to medicine. Pt stable. no acute distress. denies trouble breathing/sob, chest pain. Will admit for further care.

## 2019-09-24 NOTE — H&P ADULT - NSHPPHYSICALEXAM_GEN_ALL_CORE
ICU Vital Signs Last 24 Hrs  T(C): 36.6 (09-24-19 @ 10:45), Max: 36.9 (09-23-19 @ 21:30)  T(F): 97.8 (09-24-19 @ 10:45), Max: 98.5 (09-23-19 @ 21:30)  HR: 90 (09-24-19 @ 10:45) (67 - 114)  BP: 110/70 (09-24-19 @ 10:45) (110/70 - 159/91)  BP(mean): --  ABP: --  ABP(mean): --  RR: 18 (09-24-19 @ 10:45) (15 - 20)  SpO2: 99% (09-24-19 @ 10:45) (93% - 100%)    GENERAL: NAD  HEENT: mucosa dry, tongue midline, floor of mouth soft, no masses or lesions, soft palate with right bulging and fluctuance, bilateral tonsils 2-3+ with exudate and white debris. Right submandibular gland induration, fullness, right submental fullness, ttp, no overlying skin changes  Pulm: normal work of breathing, CTABL  CV: RRR, S1&S2+, no m/r/g appreciated  ABDOMEN: soft, nt, nd,   MSK: nl ROM  EXTREMITIES:  no appreciable edema in b/l LE  Neuro: A&Ox3, no focal deficits  SKIN: warm and dry, no visible rash

## 2019-09-24 NOTE — ED CDU PROVIDER SUBSEQUENT DAY NOTE - HISTORY
Pt re-evaluated by ENT this morning- recommend admission to medicine. Pt stable. no acute distress. denies trouble breathing/sob, chest pain. Pt transferred to [facility] ED from another hospital for ENT eval.  Has significant facial cellulitis, placed in CDU for re-eval.  Not showing much improvement this am, htough pt states his pain is somewhat improved.      Pt re-evaluated by ENT this morning- recommend admission to medicine. Pt stable. no acute distress. denies trouble breathing/sob, chest pain.

## 2019-09-24 NOTE — ED CDU PROVIDER DISPOSITION NOTE - CLINICAL COURSE
CDU Att Admit Note: Ritesh  Transferred from outside hospital d/t facial cellulitis. Had been inpatient at OSH, but placed in CDU at [facility].  States he feels somewhat better, but swelling persists.  To be admitted, per ENT.    I have personally performed a face to face evaluation of this patient including review of the history and focused physical exam.  I have discussed the case and reviewed the plan of care with the PA.

## 2019-09-24 NOTE — H&P ADULT - ASSESSMENT
54M h/o T2DM who presents from outside hospital with sore throat x 2 days and inability to tolerate PO, a/f sepsis 2/2 R PTA:

## 2019-09-24 NOTE — ED CDU PROVIDER SUBSEQUENT DAY NOTE - PROGRESS NOTE DETAILS
Pt resting comfortably, ambulating to rest room. will continue to monitor through the night. tolerating secretions, no acute distress. Pt re-evaluated by ENT, recommend admission for continued care and abx. Pt stable. evaluated patient bedside with Dr. lópez. pending hospitalist call back. Pt re-evaluated by ENT, recommend admission for continued care and abx. Pt stable. evaluated patient bedside with Dr. lópez. accepted by Dr. Chawla

## 2019-09-24 NOTE — H&P ADULT - PROBLEM SELECTOR PLAN 1
2/2 PTA   -c/w clindamycin q8, f/u tissue culture, obtain blood cultures, tylenol and morphine prn for pain, c/w IVF  - Decadron x3 doses total   - Massage of R SMG  - Warm compresses to R SMG   - Sialogogues   - Strict glucose control  - Clindamycin TID   - Warm salt water rinses TID after meals  - soft diet as tolerated 2/2 PTA   -c/w clindamycin q8, f/u tissue culture, obtain blood cultures, tylenol and morphine prn for pain, c/w IVF  - Decadron x3 doses total   - Massage of R SMG  - Warm compresses to R SMG   - Sialogogues   - Strict glucose control  - Clindamycin TID, broaden abx if spikes fevers or clinically worsens   - Warm salt water rinses TID after meals  - soft diet as tolerated

## 2019-09-24 NOTE — H&P ADULT - NSHPLABSRESULTS_GEN_ALL_CORE
admission labs personally reviewed: leukocytosis with inc bands, neutrophil predominance in setting of infection admission labs personally reviewed: leukocytosis with inc bands, neutrophil predominance in setting of infection    discussed management plan with ENT

## 2019-09-24 NOTE — H&P ADULT - PROBLEM SELECTOR PLAN 3
-SSI with FS qac and hs   -check a1c -SSI with FS qac and hs   -check a1c  -start basal/bolus -SSI with FS qac and hs   -check a1c  -start basal/bolus while on steroids

## 2019-09-24 NOTE — ED CDU PROVIDER SUBSEQUENT DAY NOTE - GASTROINTESTINAL NEGATIVE STATEMENT, MLM
9/13/2017       RE: Tisha Arias  [address] [place] [address]     Dear Colleague,    Thank you for referring your patient, Tisha Arias, to the [facility] at [facility]. Please see a copy of my visit note below.    [facility]  Lung Nodule Clinic Note  September 13, 2017    Chief complaint:  Tisha Arias is a 57 year old female seen in the Pulmonary Clinic  for   Chief Complaint   Patient presents with     Oncology Clinic Visit     New for Hilar Adenopathy       Assessment and Plan:  #1 known breast cancer on tamoxifen since 2014. Recent PET scan revealed 2 lesions one on the right 10th rib close to vertebral body that will be biopsied by interventional radiology this Friday under CT guidance. Also another lesion found in the left infrahilar area with PET positivity. We discussed possible diagnostic approaches such as EUS being the most accurate in diagnostic yield. Her oncologist will talk to GI to arrange EUS at the same time this Friday. The patient is in agreement with the plan.  #2 mosaic attenuation in both lungs based on pet and chest CT scans. This finding can be seen in small airway disease such as asthma or vascular disease such as pulmonary hypertension. Her asthma is well-controlled with rescue inhaler only. A dedicated CT scan of the chest can be Done to determine air-trapping and also an echocardiogram can be done to rule out pulmonary hypertension. I will defer these to her primary care provider after the above mentioned biopsies are done since they are more pressing issues at the moment.    I spent more than 45 minutes face to face and greater than 50% of time was for counseling and coordination of care about abnormal ct    History of Present Illness:   57 years old woman with history of breast cancer has been followed by oncology. Recently found to have 2 pet-positive areas as above. She is here today to discuss  the biopsy options of left infrahilar lesion.     Exposure history: Asbestos;  No , TB;  No , Radiation;   No     Allergies   Allergen Reactions     Baclofen Other (See Comments) and Unknown     Other reaction(s): Edema, chest pain, seizures.      No Clinical Screening - See Comments Shortness Of Breath, Palpitations, Anaphylaxis, Itching, Swelling, Difficulty breathing and Rash     Sukhjinder wipes- oral allergy -  July 2015: throat tightness from a Chinese herbal medicine Guillen Tong Blank Barry Tran     Serotonin Anxiety, Other (See Comments) and Swelling     Seizures      Amitriptyline Hcl Swelling     Birch Trees      Potatoes, carrots, cherries, celery, apple, pears, plums, peaches, parsnip, kiwi, hazelnuts, and apricots,      Blue Dyes Itching     Headaches       Cymbalta Other (See Comments)     Flushing, tremor/muscle twitching and edema     Gabapentin Other (See Comments)     edema  Systemic edema, weaned off from Feb to March per Dr. Dowd.    edema     Grass      Mugwort [Artemisia Vulgaris]      Various spices     Ragweeds      Melons, bananas, cucumbers, zucchini.     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 No real need for Epipen for this alone.     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 for: dog/M/T/G/W--NEGATIVE FOOD TESTS FOR: shrimp, crab, lobster, coconut        Past Surgical History:   Procedure Laterality Date     BACK SURGERY  ,    Bulging disc w nerve root impigment     BIOPSY BREAST      right     BIOPSY BREAST  11    right, core and sterotactic     BYPASS GASTRIC, CHOLECYSTECTOMY, COMBINED       C LAPAROSCOPIC GASTRIC RESTRICTIVE PX, W/GASTRIC BYPASS/ GAMALIEL-EN-Y, < 150CM      Gamaliel en Y?      SECTION       COLONOSCOPY      normal     COSMETIC SURGERY  2012    Final Stage of Mastectomy     DAVINCI HYSTERECTOMY TOTAL, BILATERAL SALPINGO-OOPHORECTOMY, COMBINED  2012    Procedure: COMBINED DAVINCI HYSTERECTOMY TOTAL, SALPINGO-OOPHORECTOMY;  Davinci Total Laparoscopic Hysterectomy, Bilateral Salpingo Oophorectomy, Pelvic Washings, Cystoscopy;  Surgeon: Evie Sheikh MD;  Location: [facility] OR     ENT SURGERY       GI SURGERY  2007    Gamaliel-en Y w cholecystectomy     GYN SURGERY  89,1/10/92    2 c-sections     HYSTERECTOMY, PAP NO LONGER INDICATED      DeVinci assister lap hyst with BSO     IRRIGATION AND DEBRIDEMENT BREAST  3/1/2012    Procedure:IRRIGATION AND DEBRIDEMENT BREAST; Irrigation and Debridement, Wound Closure Right Breast; Surgeon:JUNG GUILLEN; Location:[facility] OR     MASTECTOMY, RECONSTRUCT BREAST, COMBINED  2012    Procedure:COMBINED MASTECTOMY, RECONSTRUCT BREAST; Bilateral Mastectomies, Right Axillary [place] Node Biopsy, Bilateral Breast Reconstruction with Tissue Expanders, Reconstruction of inframammary fold, bilateral pain management systems; Surgeon:ANUPAM CAMPBELL; Location:[facility] OR     RECONSTRUCT BREAST  2012    Procedure: RECONSTRUCT BREAST;  Bilateral 2nd  stage breast reconstruction, revision,       SOFT TISSUE SURGERY  1-30-12    Mastectomy w severe myofascial pain syndrome     THYROIDECTOMY  7/10/2013    Procedure: THYROIDECTOMY;  Total Thyroidectomy with central neck dissection;  Surgeon: Indiana Sneed MD;  Location: [facility] OR     TONSILLECTOMY      childhood        Social History     Social History     Marital status:      Spouse name: N/A     Number of children: N/A     Years of education: N/A     Occupational History     Not on file.     Social History Main Topics     Smoking status: Never Smoker     Smokeless tobacco: Never Used      Comment: no 2nd hand smoke exposure     Alcohol use Yes      Comment: rare     Drug use: No     Sexual activity: Not Currently     Partners: Male     Birth control/ protection: Surgical      Comment: Tubal Ligation then hysterectomy     Other Topics Concern     Parent/Sibling W/ Cabg, Mi Or Angioplasty Before 65f 55m? Yes     Adrian Martinez     Social History Narrative        Family History   Problem Relation Age of Onset     Allergies Mother      Arthritis Mother      CANCER Mother      uterine/uterine     Hypertension Mother      on HCTZ     Hyperlipidemia Mother      CEREBROVASCULAR DISEASE Mother      s/p brain surgery     Breast Cancer Mother      Depression Mother      Anxiety Disorder Mother      Anesthesia Reaction Mother      Asthma Mother      Skin Cancer Mother      HEART DISEASE Father      DIABETES Father      Coronary Artery Disease Father      Hypertension Father      Hyperlipidemia Father      CEREBROVASCULAR DISEASE Father      Multiple strokes     Obesity Father      DIABETES Brother      Depression Brother      Hypertension Brother      CANCER Maternal Grandfather      pancreatic cancer/stomach cancer     Prostate Cancer Maternal Grandfather      Prostrate and Bladder     Other Cancer Maternal Grandfather      Pancreatic 1988, Bladder 1977     CANCER Maternal Grandmother      uterine     Breast  Cancer Maternal Grandmother      Skin Cancer Maternal Grandmother      CANCER Brother 57     pancreatic cancer     DIABETES Brother      Breast Cancer Cousin      Grand mothers sister     Other Cancer Brother      Stage 3 Pancreatic 2-2015     Depression Brother      Asthma Brother      Obesity Brother      Anesthesia Reaction Other      H/a, itching, drug interactions     Asthma Other      CANCER Brother      Pancreatic      Thyroid Disease No family hx of         Immunization History   Administered Date(s) Administered     DTAP/HEPB/POLIO, INACTIVATED <7Y (PEDIARIX) 04/08/1997     HepB 03/25/1993, 04/22/1993, 08/26/1993     Influenza (IIV3) 10/20/2011, 10/12/2012, 10/03/2013, 10/16/2014     Influenza Vaccine IM 3yrs+ 4 Valent IIV4 09/12/2016     TD (ADULT, 7+) 04/08/1997     TDAP Vaccine (Adacel) 03/01/2007       Current Outpatient Prescriptions   Medication Sig     dexamethasone (DECADRON) 4 MG tablet Take 1 tablet (4 mg) by mouth 2 times daily (with meals)     oxyCODONE (ROXICODONE) 10 MG IR tablet Take 1.5-2 tablets (15-20 mg) by mouth every 4 hours as needed     morphine (MS CONTIN) 30 MG 12 hr tablet Take 1 tablet (30 mg) by mouth every 8 hours     diazepam (VALIUM) 5 MG tablet Take 1 tablet (5 mg) by mouth 3 times daily as needed For muscle spasms     levothyroxine (SYNTHROID/LEVOTHROID) 112 MCG tablet Mon-Sat: (2 tabs daily) Sunday: 3 tabs     hydrochlorothiazide (MICROZIDE) 12.5 MG capsule Take 1 capsule (12.5 mg) by mouth daily     SUMAtriptan (IMITREX) 50 MG tablet Take 1 tablet (50 mg) by mouth at onset of headache for migraine (may repeat in 2 hours as needed, max 2 tablets daily)     COLCRYS 0.6 MG tablet Take 1.6 mg by mouth 3 times daily     celecoxib (CELEBREX) 200 MG capsule Take 1 capsule (200 mg) by mouth 2 times daily     montelukast (SINGULAIR) 10 MG tablet TAKE TWO TABLETS BY MOUTH TWICE DAILY     chlorhexidine (PERIDEX) 0.12 % solution      HYDROcodone-acetaminophen (NORCO) 5-325 MG per  tablet TK 1 T PO Q 4 TO 6 H PRN P     Colchicine 0.6 MG CAPS Take 0.6 mg by mouth 3 times daily for costochondritis (chest) discomfort. Scale back use to prn with loose stools or bloating.     EPINEPHrine (EPIPEN 2-CARIDAD) 0.3 MG/0.3ML injection Inject 0.3 mLs (0.3 mg) into the muscle once as needed for anaphylaxis     methocarbamol (ROBAXIN) 750 MG tablet Take 1 tablet (750 mg) by mouth 4 times daily as needed . Ok to take a 5th Robaxin on very severe days.     potassium chloride SA (POTASSIUM CHLORIDE) 20 MEQ CR tablet Take 1 tablet (20 mEq) by mouth daily     VENTOLIN  (90 BASE) MCG/ACT Inhaler INHALE 2 PUFFS INTO THE LUNGS EVERY 4 HOURS AS NEEDED FOR SHORTNESS OF BREATH OR DIFFICULT BREATHING OR WHEEZING     cromolyn (OPTICROM) 4 % ophthalmic solution Place 1 drop into both eyes 4 times daily     NASALCROM 5.2 MG/ACT AERS Inhaler SPRAY ONE SPRAY( 1 ML) IN NOSTRIL DAILY     Cholecalciferol (VITAMIN D3) 2000 UNITS CAPS Take 10,000 Units by mouth daily     ondansetron (ZOFRAN ODT) 4 MG ODT tab Take 1-2 tablets (4-8 mg) by mouth every 8 hours as needed for nausea or vomiting     COMPOUND (CMPD RX) - PHARMACY TO MIX COMPOUNDED MEDICATION Apply small amount to affected area two times daily.Ketamine 6% + ketoprofen 10% + lidocaine 10% in Lipo.     lidocaine (XYLOCAINE) 5 % ointment SANJANA TOPICALLY QID PRN     order for DME Equipment being ordered: compression stockings. 20-30 mm or 30 - 40 mm as patient can tolerate. Physical therapy to determine if they should be above or below the knee.     order for DME Equipment being ordered: compression bra     ranitidine (ZANTAC) 75 MG tablet Take 1 tablet (75 mg) by mouth 3 times daily     tamoxifen (NOLVADEX) 20 MG tablet Take 1 tablet (20 mg) by mouth daily     Lidocaine HCl Monohydrate POWD      calcitRIOL (ROCALTROL) 0.25 MCG capsule 2 tabs in am and 1 tab qhs     aluminum chloride (DRYSOL) 20 % external solution Apply topically At Bedtime     tacrolimus (PROTOPIC) 0.1 %  ointment Apply topically 2 times daily     triamcinolone (KENALOG) 0.025 % ointment Apply topically 2 times daily Mix with 1 lb jar of vaseline or aquaphor     UNABLE TO FIND MEDICATION NAME: Tumeric 3 capsules daily     cetirizine (ZYRTEC ALLERGY) 10 MG tablet Take 1 tablet (10 mg) by mouth 3 times daily On hold for lab test.     diclofenac (VOLTAREN) 1 % GEL Apply affected area two times daily PRN using enclosed dosing card.     mometasone (ASMANEX 30 METERED DOSES) 110 MCG/INH inhaler Inhale 1 puff into the lungs daily     UNABLE TO FIND 2 tablets 3 times daily MEDICATION NAME: Natural D-Hist     MAGNESIUM CITRATE PO Take 400 mg by mouth daily     DiphenhydrAMINE HCl (BENADRYL PO) Take 50 mg by mouth as needed     calcium citrate-vitamin D (CALCIUM CITRATE +D) 315-250 MG-UNIT TABS Take 2 tablets by mouth 3 times daily     calcium carbonate (TUMS) 500 MG chewable tablet Take 2 tablets (1,000 mg) by mouth nightly as needed for other (cramps)     UNABLE TO FIND 1 tablet 3 times daily MEDICATION NAME: calcium D-Glucarate     Digestive Enzymes (BETAINE HCL PO) Take 2 tablets by mouth as needed Betaine HCL and Pepsin: Take 1-7 tabs by mouth daily, if burning take one tablet less once daily.  Betaine HCL 1.04 g  Pepsin 40 mg     Triamcinolone Acetonide (AZMACORT IN) Inhale 2 puffs into the lungs as needed     UNABLE TO FIND 1 tablet 3 times daily MEDICATION NAME: Digestzymes     UNABLE TO FIND 1 tablet daily MEDICATION NAME: Pure Encapsulations     cyclobenzaprine (FLEXERIL) 10 MG tablet Take 10 mg by mouth 3 times daily as needed On hold Dr Monsalve     Omega-3 Fatty Acids (OMEGA 3 PO) Take 5 mLs by mouth     ACAI PO Take 1,000 mg by mouth 2 times daily     Probiotic Product (PROBIOTIC DAILY PO) Take 1 capsule by mouth daily Lacto acid bifidobacterium     polyethylene glycol (MIRALAX) powder Take 17 g by mouth daily     docusate sodium 100 MG tablet Take 100 mg by mouth daily     morphine (MS CONTIN) 15 MG 12 hr tablet       No current facility-administered medications for this visit.         Review of Systems:  I have done 10 points of review systems and pertinent findings are negative.    Physical examination  Constitutional: Alert, oriented, not in distress  Vitals: B/P: 151/78, T: 97, P: 92, R: 18  Eyes: No icterus, nystagmus, pupils isocoric  Musculoskeletal: Normal muscle mass, no dephormity  Integumentary:  No rash, normal texture and turgor, no edema  Neurological: Alert, orientedx3, no motor deficits, cranial nerves grossly normal  Psychiatric:  Mood and affect are appropriate with insight into his/her medical condition  Hematologic/Immunologic/Lymphatic: No bruise, no lymph node enargement     Data:  Lab Results   Component Value Date    WBC 3.7 09/07/2017     Lab Results   Component Value Date    RBC 4.28 09/07/2017     Lab Results   Component Value Date    HGB 10.5 09/07/2017     Lab Results   Component Value Date    HCT 34.0 09/07/2017     Lab Results   Component Value Date    MCV 79 09/07/2017     Lab Results   Component Value Date    MCH 24.5 09/07/2017     Lab Results   Component Value Date    MCHC 30.9 09/07/2017     Lab Results   Component Value Date    RDW 15.2 09/07/2017     Lab Results   Component Value Date     09/07/2017       Lab Results   Component Value Date     09/07/2017      Lab Results   Component Value Date    POTASSIUM 3.9 09/07/2017     Lab Results   Component Value Date    CHLORIDE 104 09/07/2017     Lab Results   Component Value Date    ELMO 8.6 09/07/2017     Lab Results   Component Value Date    CO2 28 09/07/2017     Lab Results   Component Value Date    BUN 13 09/07/2017     Lab Results   Component Value Date    CR 0.85 09/07/2017     Lab Results   Component Value Date     09/07/2017       Thank you for allowing me participate in the care of Tisha Arias.    ZEB Dobbs MD             no abdominal pain, no bloating, no constipation, no diarrhea, no nausea and no vomiting.

## 2019-09-24 NOTE — PROGRESS NOTE ADULT - SUBJECTIVE AND OBJECTIVE BOX
Patient seen and examined at the bedside. No acute events overnight. Patient is now able to tolerate PO intake, pain is controlled.    T(C): 36.7 (09-24-19 @ 13:59), Max: 36.9 (09-23-19 @ 21:30)  HR: 95 (09-24-19 @ 13:59) (67 - 95)  BP: 125/79 (09-24-19 @ 13:59) (110/70 - 159/91)  RR: 18 (09-24-19 @ 13:59) (15 - 20)  SpO2: 98% (09-24-19 @ 13:59) (98% - 100%)  NAD, awake and alert  No respiratory distress, stridor, or stertor  Voice quality: normal  Face:  Symmetric without masses or lesions  OU: PERRL, EOMI  Nose: nasal cavity clear bilaterally, inferior turbinates normal, mucosa normal without crusting or bleeding  OC/OP: mucosa dry, tongue midline, floor of mouth soft, no masses or lesions, soft palate with decreased right bulging, no fluctuance, moderate TTP, bilateral tonsils 2+ with small exudate and white debris.   Neck: Right submandibular gland induration, fullness, right submental fullness, ttp, no overlying skin changes, no purulence from stensen's or merry's duct bilaterally, R LAD, full neck ROM  CNII-XII intact    A/P:  54M with hx of DM with R PTA s/p I&D and submandibular gland swelling, possible sialadenitis vs. reactive from PTA.   - Admit to medicine  - Decadron 10mg x3 doses total, stop now  - Massages of R SMG  - Aggressive PO vs IV hydration  - Warm compresses to R SMG   - Sialogogues   - Strict glucose control  - Clindamycin TID   - Warm salt water rinses TID after meals  - Diet as tolerated  - will follow for continued improvement of symptoms

## 2019-09-24 NOTE — H&P ADULT - NSHPREVIEWOFSYSTEMS_GEN_ALL_CORE
REVIEW OF SYSTEMS:    CONSTITUTIONAL: No weakness, fevers or chills  EYES/ENT: see above hpi  NECK: No pain or stiffness  RESPIRATORY: No cough, wheezing, hemoptysis; No shortness of breath  CARDIOVASCULAR: No chest pain or palpitations  GASTROINTESTINAL: No abdominal or epigastric pain. No nausea, vomiting, or hematemesis; No diarrhea or constipation. No melena or hematochezia.  GENITOURINARY: No dysuria, change in frequency or hematuria  NEUROLOGICAL: No numbness or weakness  MSK: no joint pain  SKIN: No itching, burning, rashes, or lesions   Psych: No depression   All other review of systems is negative unless indicated above.

## 2019-09-24 NOTE — H&P ADULT - HISTORY OF PRESENT ILLNESS
54M h/o T2DM who presents from outside hospital with sore throat x 2 days and inability to tolerate PO. States he notices changes to his voice. Denies respiratory distress, stridor or stertor. No ear pain, + trismus. WBC 18.95. Was started on IV unasyn and CT scan performed revealing R PTA.  CT neck:   1. Severe right-sided pharyngitis with inflammatory change tracking to the right submandibular space and along the right sternocleidomastoid muscle.   Overlying cellulitis in the right cheek.   2. Right peritonsillar abscess measures 1.8 x 1 cm. This is located along the anterior-superior-lateral margin of the right palatine tonsil.   3. Trace retropharyngeal edema. No retropharyngeal phlegmon or abscess.   Started on decadron. Has had mild improvement in swelling but continues to have trouble swallowing. No fevers/chills, SOB, dyspnea, CP.

## 2019-09-25 LAB
ALBUMIN SERPL ELPH-MCNC: 2.9 G/DL — LOW (ref 3.3–5)
ALP SERPL-CCNC: 43 U/L — SIGNIFICANT CHANGE UP (ref 40–120)
ALT FLD-CCNC: 8 U/L — SIGNIFICANT CHANGE UP (ref 4–41)
ANION GAP SERPL CALC-SCNC: 15 MMO/L — HIGH (ref 7–14)
AST SERPL-CCNC: 8 U/L — SIGNIFICANT CHANGE UP (ref 4–40)
BASOPHILS # BLD AUTO: 0.03 K/UL — SIGNIFICANT CHANGE UP (ref 0–0.2)
BASOPHILS NFR BLD AUTO: 0.2 % — SIGNIFICANT CHANGE UP (ref 0–2)
BILIRUB SERPL-MCNC: 0.6 MG/DL — SIGNIFICANT CHANGE UP (ref 0.2–1.2)
BUN SERPL-MCNC: 28 MG/DL — HIGH (ref 7–23)
CALCIUM SERPL-MCNC: 8.9 MG/DL — SIGNIFICANT CHANGE UP (ref 8.4–10.5)
CHLORIDE SERPL-SCNC: 103 MMOL/L — SIGNIFICANT CHANGE UP (ref 98–107)
CO2 SERPL-SCNC: 18 MMOL/L — LOW (ref 22–31)
CREAT SERPL-MCNC: 0.64 MG/DL — SIGNIFICANT CHANGE UP (ref 0.5–1.3)
EOSINOPHIL # BLD AUTO: 0 K/UL — SIGNIFICANT CHANGE UP (ref 0–0.5)
EOSINOPHIL NFR BLD AUTO: 0 % — SIGNIFICANT CHANGE UP (ref 0–6)
GLUCOSE SERPL-MCNC: 318 MG/DL — HIGH (ref 70–99)
HCT VFR BLD CALC: 35.1 % — LOW (ref 39–50)
HGB BLD-MCNC: 11.7 G/DL — LOW (ref 13–17)
IMM GRANULOCYTES NFR BLD AUTO: 0.8 % — SIGNIFICANT CHANGE UP (ref 0–1.5)
LACTATE SERPL-SCNC: 1.4 MMOL/L — SIGNIFICANT CHANGE UP (ref 0.5–2)
LYMPHOCYTES # BLD AUTO: 1.47 K/UL — SIGNIFICANT CHANGE UP (ref 1–3.3)
LYMPHOCYTES # BLD AUTO: 7.5 % — LOW (ref 13–44)
MANUAL SMEAR VERIFICATION: SIGNIFICANT CHANGE UP
MCHC RBC-ENTMCNC: 25.1 PG — LOW (ref 27–34)
MCHC RBC-ENTMCNC: 33.3 % — SIGNIFICANT CHANGE UP (ref 32–36)
MCV RBC AUTO: 75.2 FL — LOW (ref 80–100)
MONOCYTES # BLD AUTO: 0.92 K/UL — HIGH (ref 0–0.9)
MONOCYTES NFR BLD AUTO: 4.7 % — SIGNIFICANT CHANGE UP (ref 2–14)
NEUTROPHILS # BLD AUTO: 17.05 K/UL — HIGH (ref 1.8–7.4)
NEUTROPHILS NFR BLD AUTO: 86.8 % — HIGH (ref 43–77)
NRBC # FLD: 0 K/UL — SIGNIFICANT CHANGE UP (ref 0–0)
PLATELET # BLD AUTO: 210 K/UL — SIGNIFICANT CHANGE UP (ref 150–400)
PMV BLD: 11.5 FL — SIGNIFICANT CHANGE UP (ref 7–13)
POTASSIUM SERPL-MCNC: 4.4 MMOL/L — SIGNIFICANT CHANGE UP (ref 3.5–5.3)
POTASSIUM SERPL-SCNC: 4.4 MMOL/L — SIGNIFICANT CHANGE UP (ref 3.5–5.3)
PROT SERPL-MCNC: 6.5 G/DL — SIGNIFICANT CHANGE UP (ref 6–8.3)
RBC # BLD: 4.67 M/UL — SIGNIFICANT CHANGE UP (ref 4.2–5.8)
RBC # FLD: 15.3 % — HIGH (ref 10.3–14.5)
SODIUM SERPL-SCNC: 136 MMOL/L — SIGNIFICANT CHANGE UP (ref 135–145)
SPECIMEN SOURCE: SIGNIFICANT CHANGE UP
WBC # BLD: 19.63 K/UL — HIGH (ref 3.8–10.5)
WBC # FLD AUTO: 19.63 K/UL — HIGH (ref 3.8–10.5)

## 2019-09-25 RX ORDER — KETOROLAC TROMETHAMINE 30 MG/ML
15 SYRINGE (ML) INJECTION ONCE
Refills: 0 | Status: DISCONTINUED | OUTPATIENT
Start: 2019-09-25 | End: 2019-09-25

## 2019-09-25 RX ADMIN — Medication 2: at 22:02

## 2019-09-25 RX ADMIN — Medication 15 MILLIGRAM(S): at 09:23

## 2019-09-25 RX ADMIN — Medication 3: at 17:36

## 2019-09-25 RX ADMIN — Medication 1 TABLET(S): at 09:04

## 2019-09-25 RX ADMIN — Medication 102 MILLIGRAM(S): at 05:06

## 2019-09-25 RX ADMIN — Medication 2 UNIT(S): at 17:36

## 2019-09-25 RX ADMIN — Medication 2 UNIT(S): at 08:36

## 2019-09-25 RX ADMIN — Medication 2 UNIT(S): at 12:15

## 2019-09-25 RX ADMIN — Medication 3: at 08:35

## 2019-09-25 RX ADMIN — Medication 100 MILLIGRAM(S): at 06:27

## 2019-09-25 RX ADMIN — Medication 100 MILLIGRAM(S): at 22:02

## 2019-09-25 RX ADMIN — INSULIN GLARGINE 8 UNIT(S): 100 INJECTION, SOLUTION SUBCUTANEOUS at 22:02

## 2019-09-25 RX ADMIN — Medication 100 MILLIGRAM(S): at 14:21

## 2019-09-25 RX ADMIN — Medication 4: at 12:14

## 2019-09-25 RX ADMIN — Medication 1 TABLET(S): at 12:14

## 2019-09-25 RX ADMIN — Medication 15 MILLIGRAM(S): at 10:14

## 2019-09-25 NOTE — PROGRESS NOTE ADULT - SUBJECTIVE AND OBJECTIVE BOX
Patient seen and examined at the bedside. No acute events overnight. Patient is now able to tolerate PO intake, pain is controlled. Feels like he's doing better today.     Vital Signs Last 24 Hrs  T(C): 36.6 (25 Sep 2019 05:08), Max: 37.1 (24 Sep 2019 21:56)  T(F): 97.9 (25 Sep 2019 05:08), Max: 98.8 (24 Sep 2019 21:56)  HR: 75 (25 Sep 2019 05:08) (75 - 99)  BP: 125/89 (25 Sep 2019 05:08) (110/70 - 128/88)  BP(mean): --  RR: 19 (25 Sep 2019 05:08) (17 - 19)  SpO2: 95% (25 Sep 2019 05:08) (95% - 100%)    NAD, awake and alert  No respiratory distress, stridor, or stertor  Voice quality: normal  Face:  Symmetric without masses or lesions  OU: PERRL, EOMI  Nose: nasal cavity clear bilaterally, inferior turbinates normal, mucosa normal without crusting or bleeding  OC/OP: mucosa dry, tongue midline, floor of mouth soft, no masses or lesions, soft palate with decreased right bulging, no fluctuance, moderate TTP, bilateral tonsils 2+ with small exudate and white debris.   Neck: Right submandibular gland induration, fullness, right submental fullness, ttp, no overlying skin changes, no purulence from stensen's or merry's duct bilaterally, R LAD, full neck ROM  CNII-XII intact    A/P:  54M with hx of DM with R PTA s/p I&D and submandibular gland swelling, possible sialadenitis vs. reactive from PTA.     - Massages of R SMG  - Aggressive PO vs IV hydration  - Warm compresses to R SMG   - Sialogogues   - Strict glucose control  - Clindamycin TID   - Warm salt water rinses TID after meals  - Diet as tolerated  - will follow for continued improvement of symptoms

## 2019-09-26 LAB
ANION GAP SERPL CALC-SCNC: 38 MMO/L — HIGH (ref 7–14)
BACTERIA WND CULT: SIGNIFICANT CHANGE UP
BUN SERPL-MCNC: 26 MG/DL — HIGH (ref 7–23)
CALCIUM SERPL-MCNC: 8.6 MG/DL — SIGNIFICANT CHANGE UP (ref 8.4–10.5)
CHLORIDE SERPL-SCNC: 104 MMOL/L — SIGNIFICANT CHANGE UP (ref 98–107)
CO2 SERPL-SCNC: 22 MMOL/L — SIGNIFICANT CHANGE UP (ref 22–31)
CREAT SERPL-MCNC: 0.56 MG/DL — SIGNIFICANT CHANGE UP (ref 0.5–1.3)
GLUCOSE SERPL-MCNC: 290 MG/DL — HIGH (ref 70–99)
HBA1C BLD-MCNC: 12 % — HIGH (ref 4–5.6)
HCT VFR BLD CALC: 36.6 % — LOW (ref 39–50)
HGB BLD-MCNC: 12.1 G/DL — LOW (ref 13–17)
MAGNESIUM SERPL-MCNC: 1.8 MG/DL — SIGNIFICANT CHANGE UP (ref 1.6–2.6)
MCHC RBC-ENTMCNC: 24.9 PG — LOW (ref 27–34)
MCHC RBC-ENTMCNC: 33.1 % — SIGNIFICANT CHANGE UP (ref 32–36)
MCV RBC AUTO: 75.3 FL — LOW (ref 80–100)
NRBC # FLD: 0 K/UL — SIGNIFICANT CHANGE UP (ref 0–0)
PHOSPHATE SERPL-MCNC: 3 MG/DL — SIGNIFICANT CHANGE UP (ref 2.5–4.5)
PLATELET # BLD AUTO: 210 K/UL — SIGNIFICANT CHANGE UP (ref 150–400)
PMV BLD: 11.3 FL — SIGNIFICANT CHANGE UP (ref 7–13)
POTASSIUM SERPL-MCNC: 4.2 MMOL/L — SIGNIFICANT CHANGE UP (ref 3.5–5.3)
POTASSIUM SERPL-SCNC: 4.2 MMOL/L — SIGNIFICANT CHANGE UP (ref 3.5–5.3)
RBC # BLD: 4.86 M/UL — SIGNIFICANT CHANGE UP (ref 4.2–5.8)
RBC # FLD: 14.9 % — HIGH (ref 10.3–14.5)
SODIUM SERPL-SCNC: 139 MMOL/L — SIGNIFICANT CHANGE UP (ref 135–145)
VANCOMYCIN TROUGH SERPL-MCNC: 1.8 UG/ML — LOW (ref 10–20)
WBC # BLD: 19.08 K/UL — HIGH (ref 3.8–10.5)
WBC # FLD AUTO: 19.08 K/UL — HIGH (ref 3.8–10.5)

## 2019-09-26 RX ORDER — KETOROLAC TROMETHAMINE 30 MG/ML
15 SYRINGE (ML) INJECTION ONCE
Refills: 0 | Status: DISCONTINUED | OUTPATIENT
Start: 2019-09-26 | End: 2019-09-26

## 2019-09-26 RX ORDER — VANCOMYCIN HCL 1 G
1000 VIAL (EA) INTRAVENOUS EVERY 12 HOURS
Refills: 0 | Status: DISCONTINUED | OUTPATIENT
Start: 2019-09-26 | End: 2019-09-28

## 2019-09-26 RX ORDER — PIPERACILLIN AND TAZOBACTAM 4; .5 G/20ML; G/20ML
3.38 INJECTION, POWDER, LYOPHILIZED, FOR SOLUTION INTRAVENOUS EVERY 8 HOURS
Refills: 0 | Status: DISCONTINUED | OUTPATIENT
Start: 2019-09-26 | End: 2019-10-01

## 2019-09-26 RX ORDER — INSULIN GLARGINE 100 [IU]/ML
12 INJECTION, SOLUTION SUBCUTANEOUS AT BEDTIME
Refills: 0 | Status: DISCONTINUED | OUTPATIENT
Start: 2019-09-26 | End: 2019-09-27

## 2019-09-26 RX ORDER — INSULIN LISPRO 100/ML
5 VIAL (ML) SUBCUTANEOUS
Refills: 0 | Status: DISCONTINUED | OUTPATIENT
Start: 2019-09-26 | End: 2019-09-27

## 2019-09-26 RX ORDER — PIPERACILLIN AND TAZOBACTAM 4; .5 G/20ML; G/20ML
3.38 INJECTION, POWDER, LYOPHILIZED, FOR SOLUTION INTRAVENOUS ONCE
Refills: 0 | Status: COMPLETED | OUTPATIENT
Start: 2019-09-26 | End: 2019-09-26

## 2019-09-26 RX ORDER — MORPHINE SULFATE 50 MG/1
4 CAPSULE, EXTENDED RELEASE ORAL EVERY 4 HOURS
Refills: 0 | Status: DISCONTINUED | OUTPATIENT
Start: 2019-09-26 | End: 2019-09-27

## 2019-09-26 RX ADMIN — Medication 2 UNIT(S): at 17:52

## 2019-09-26 RX ADMIN — Medication 15 MILLIGRAM(S): at 07:08

## 2019-09-26 RX ADMIN — Medication 1: at 22:59

## 2019-09-26 RX ADMIN — Medication 4: at 17:52

## 2019-09-26 RX ADMIN — PIPERACILLIN AND TAZOBACTAM 25 GRAM(S): 4; .5 INJECTION, POWDER, LYOPHILIZED, FOR SOLUTION INTRAVENOUS at 16:17

## 2019-09-26 RX ADMIN — Medication 100 MILLIGRAM(S): at 06:07

## 2019-09-26 RX ADMIN — Medication 3: at 08:32

## 2019-09-26 RX ADMIN — PIPERACILLIN AND TAZOBACTAM 200 GRAM(S): 4; .5 INJECTION, POWDER, LYOPHILIZED, FOR SOLUTION INTRAVENOUS at 08:34

## 2019-09-26 RX ADMIN — Medication 15 MILLIGRAM(S): at 06:44

## 2019-09-26 RX ADMIN — Medication 15 MILLIGRAM(S): at 16:16

## 2019-09-26 RX ADMIN — Medication 250 MILLIGRAM(S): at 09:23

## 2019-09-26 RX ADMIN — INSULIN GLARGINE 12 UNIT(S): 100 INJECTION, SOLUTION SUBCUTANEOUS at 22:59

## 2019-09-26 RX ADMIN — Medication 2 UNIT(S): at 08:33

## 2019-09-26 RX ADMIN — Medication 2 UNIT(S): at 12:18

## 2019-09-26 RX ADMIN — Medication 5: at 12:18

## 2019-09-26 NOTE — PROGRESS NOTE ADULT - SUBJECTIVE AND OBJECTIVE BOX
Patient seen and examined at the bedside. No acute events overnight. Patient is now able to tolerate PO intake, pain is controlled. Feels unchanged.    T(C): 36.8 (09-26-19 @ 06:09), Max: 36.9 (09-25-19 @ 21:43)  HR: 76 (09-26-19 @ 06:09) (73 - 86)  BP: 123/84 (09-26-19 @ 06:09) (123/84 - 136/-)  RR: 16 (09-26-19 @ 06:09) (16 - 20)  SpO2: 96% (09-26-19 @ 06:09) (96% - 98%)    NAD, awake and alert  No respiratory distress, stridor, or stertor  Voice quality: normal  Face:  Symmetric without masses or lesions  OU: PERRL, EOMI  Nose: nasal cavity clear bilaterally, inferior turbinates normal, mucosa normal without crusting or bleeding  OC/OP: mucosa dry, tongue midline, floor of mouth soft, no masses or lesions, soft palate with decreased right bulging, no fluctuance, moderate TTP, bilateral tonsils 2+ with small exudate and white debris.   Neck: Right submandibular gland induration, fullness, right submental fullness, ttp, no overlying skin changes, no purulence from stensen's or merry's duct bilaterally, R LAD, full neck ROM  CNII-XII intact    A/P:  54M with hx of DM with R PTA s/p I&D and submandibular gland swelling, possible sialadenitis vs. reactive from PTA.     - Massages of R SMG  - Aggressive PO vs IV hydration  - Warm compresses to R SMG   - Sialogogues   - Strict glucose control  - Clindamycin TID   - Warm salt water rinses TID after meals  - Diet as tolerated  - will follow for continued improvement of symptoms Patient seen and examined at the bedside. No acute events overnight. Patient is now able to tolerate PO intake, pain is controlled. Feels unchanged.    T(C): 36.8 (09-26-19 @ 06:09), Max: 36.9 (09-25-19 @ 21:43)  HR: 76 (09-26-19 @ 06:09) (73 - 86)  BP: 123/84 (09-26-19 @ 06:09) (123/84 - 136/-)  RR: 16 (09-26-19 @ 06:09) (16 - 20)  SpO2: 96% (09-26-19 @ 06:09) (96% - 98%)    NAD, awake and alert  No respiratory distress, stridor, or stertor  Voice quality: normal  Face:  Symmetric without masses or lesions  OU: PERRL, EOMI  Nose: nasal cavity clear bilaterally, inferior turbinates normal, mucosa normal without crusting or bleeding  OC/OP: mucosa dry, tongue midline, floor of mouth soft, no masses or lesions, soft palate with decreased right bulging, no fluctuance, moderate TTP, bilateral tonsils 2+ with small exudate and white debris.   Neck: Right submandibular gland induration, fullness, right submental fullness, ttp, no overlying skin changes, no purulence from stensen's or merry's duct bilaterally, R LAD, full neck ROM  CNII-XII intact    A/P:  54M with hx of DM with R PTA s/p I&D and submandibular gland swelling, possible sialadenitis vs. reactive from PTA.     - Massages of R SMG  - Aggressive PO vs IV hydration  - Warm compresses to R SMG   - Sialogogues   - Strict glucose control  - [place] to vanc/ zosyn    - Warm salt water rinses TID after meals  - Diet as tolerated  - will follow for continued improvement of symptoms

## 2019-09-26 NOTE — PROGRESS NOTE ADULT - SUBJECTIVE AND OBJECTIVE BOX
Patient is a 54y old  Male who presents with a chief complaint of dysphagia (26 Sep 2019 06:59)      INTERVAL HPI/OVERNIGHT EVENTS:  T(C): 36.6 (09-26-19 @ 17:58), Max: 36.9 (09-25-19 @ 21:43)  HR: 69 (09-26-19 @ 17:58) (69 - 89)  BP: 128/91 (09-26-19 @ 17:58) (103/56 - 133/89)  RR: 17 (09-26-19 @ 17:58) (16 - 18)  SpO2: 96% (09-26-19 @ 17:58) (96% - 98%)  Wt(kg): --  I&O's Summary      PAST MEDICAL & SURGICAL HISTORY:  Type 2 diabetes mellitus  No significant past surgical history      SOCIAL HISTORY  Alcohol:  Tobacco:  Illicit substance use:    FAMILY HISTORY:    REVIEW OF SYSTEMS:  CONSTITUTIONAL: No fever, weight loss, or fatigue  EYES: No eye pain, visual disturbances, or discharge  ENMT:  No difficulty hearing, tinnitus, vertigo; No sinus or throat pain  NECK: No pain or stiffness  RESPIRATORY: No cough, wheezing, chills or hemoptysis; No shortness of breath  CARDIOVASCULAR: No chest pain, palpitations, dizziness, or leg swelling  GASTROINTESTINAL: No abdominal or epigastric pain. No nausea, vomiting, or hematemesis; No diarrhea or constipation. No melena or hematochezia.  GENITOURINARY: No dysuria, frequency, hematuria, or incontinence  NEUROLOGICAL: No headaches, memory loss, loss of strength, numbness, or tremors  SKIN: No itching, burning, rashes, or lesions   LYMPH NODES: No enlarged glands  ENDOCRINE: No heat or cold intolerance; No hair loss  MUSCULOSKELETAL: No joint pain or swelling; No muscle, back, or extremity pain  PSYCHIATRIC: No depression, anxiety, mood swings, or difficulty sleeping  HEME/LYMPH: No easy bruising, or bleeding gums  ALLERY AND IMMUNOLOGIC: No hives or eczema    RADIOLOGY & ADDITIONAL TESTS:    Imaging Personally Reviewed:  [ ] YES  [ ] NO    Consultant(s) Notes Reviewed:  [ ] YES  [ ] NO    PHYSICAL EXAM:  GENERAL: NAD, well-groomed, well-developed  HEAD:  Atraumatic, Normocephalic  EYES: EOMI, PERRLA, conjunctiva and sclera clear  ENMT: No tonsillar erythema, exudates, or enlargement; Moist mucous membranes, Good dentition, No lesions  NECK: Supple, No JVD, Normal thyroid  NERVOUS SYSTEM:  Alert & Oriented X3, Good concentration; Motor Strength 5/5 B/L upper and lower extremities; DTRs 2+ intact and symmetric  CHEST/LUNG: Clear to percussion bilaterally; No rales, rhonchi, wheezing, or rubs  HEART: Regular rate and rhythm; No murmurs, rubs, or gallops  ABDOMEN: Soft, Nontender, Nondistended; Bowel sounds present  EXTREMITIES:  2+ Peripheral Pulses, No clubbing, cyanosis, or edema  LYMPH: No lymphadenopathy noted  SKIN: No rashes or lesions    LABS:                        12.1   19.08 )-----------( 210      ( 26 Sep 2019 06:00 )             36.6     09-26    139  |  104  |  26<H>  ----------------------------<  290<H>  4.2   |  22  |  0.56    Ca    8.6      26 Sep 2019 06:00  Phos  3.0     09-26  Mg     1.8     09-26    TPro  6.5  /  Alb  2.9<L>  /  TBili  0.6  /  DBili  x   /  AST  8   /  ALT  8   /  AlkPhos  43  09-25        CAPILLARY BLOOD GLUCOSE      POCT Blood Glucose.: 346 mg/dL (26 Sep 2019 17:25)  POCT Blood Glucose.: 354 mg/dL (26 Sep 2019 12:01)  POCT Blood Glucose.: 271 mg/dL (26 Sep 2019 08:04)  POCT Blood Glucose.: 318 mg/dL (25 Sep 2019 21:29)            MEDICATIONS  (STANDING):  dextrose 5%. 1000 milliLiter(s) (50 mL/Hr) IV Continuous <Continuous>  dextrose 50% Injectable 12.5 Gram(s) IV Push once  dextrose 50% Injectable 25 Gram(s) IV Push once  dextrose 50% Injectable 25 Gram(s) IV Push once  influenza   Vaccine 0.5 milliLiter(s) IntraMuscular once  insulin glargine Injectable (LANTUS) 12 Unit(s) SubCutaneous at bedtime  insulin lispro (HumaLOG) corrective regimen sliding scale   SubCutaneous three times a day before meals  insulin lispro (HumaLOG) corrective regimen sliding scale   SubCutaneous at bedtime  insulin lispro Injectable (HumaLOG) 5 Unit(s) SubCutaneous three times a day before meals  piperacillin/tazobactam IVPB.. 3.375 Gram(s) IV Intermittent every 8 hours  vancomycin  IVPB 1000 milliGRAM(s) IV Intermittent every 12 hours    MEDICATIONS  (PRN):  acetaminophen   Tablet .. 650 milliGRAM(s) Oral every 6 hours PRN Mild Pain (1 - 3)  dextrose 40% Gel 15 Gram(s) Oral once PRN Blood Glucose LESS THAN 70 milliGRAM(s)/deciliter  glucagon  Injectable 1 milliGRAM(s) IntraMuscular once PRN Glucose LESS THAN 70 milligrams/deciliter  morphine  - Injectable 4 milliGRAM(s) IV Push every 4 hours PRN moderate to severe pain (4-10)      Care Discussed with Consultants/Other Providers [ ] YES  [ ] NO Patient is a 54y old  Male who presents with a chief complaint of dysphagia (26 Sep 2019 06:59)    pt. tolerating diet now, c/o rt. sided submandibular pain and swelling     INTERVAL HPI/OVERNIGHT EVENTS:  T(C): 36.6 (09-26-19 @ 17:58), Max: 36.9 (09-25-19 @ 21:43)  HR: 69 (09-26-19 @ 17:58) (69 - 89)  BP: 128/91 (09-26-19 @ 17:58) (103/56 - 133/89)  RR: 17 (09-26-19 @ 17:58) (16 - 18)  SpO2: 96% (09-26-19 @ 17:58) (96% - 98%)  Wt(kg): --  I&O's Summary      PAST MEDICAL & SURGICAL HISTORY:  Type 2 diabetes mellitus  No significant past surgical history      SOCIAL HISTORY  Alcohol:  Tobacco:  Illicit substance use:    FAMILY HISTORY:    REVIEW OF SYSTEMS:  CONSTITUTIONAL: No fever, weight loss, or fatigue  EYES: No eye pain, visual disturbances, or discharge  ENMT:  No difficulty hearing, tinnitus, vertigo; No sinus or throat pain  NECK: No pain or stiffness  RESPIRATORY: No cough, wheezing, chills or hemoptysis; No shortness of breath  CARDIOVASCULAR: No chest pain, palpitations, dizziness, or leg swelling  GASTROINTESTINAL: No abdominal or epigastric pain. No nausea, vomiting, or hematemesis; No diarrhea or constipation. No melena or hematochezia.  GENITOURINARY: No dysuria, frequency, hematuria, or incontinence  NEUROLOGICAL: No headaches, memory loss, loss of strength, numbness, or tremors  SKIN: No itching, burning, rashes, or lesions   LYMPH NODES: No enlarged glands  ENDOCRINE: No heat or cold intolerance; No hair loss  MUSCULOSKELETAL: No joint pain or swelling; No muscle, back, or extremity pain  PSYCHIATRIC: No depression, anxiety, mood swings, or difficulty sleeping  HEME/LYMPH: No easy bruising, or bleeding gums  ALLERY AND IMMUNOLOGIC: No hives or eczema    RADIOLOGY & ADDITIONAL TESTS:    Imaging Personally Reviewed:  [ ] YES  [ ] NO    Consultant(s) Notes Reviewed:  [ ] YES  [ ] NO    PHYSICAL EXAM:  GENERAL: NAD, well-groomed, well-developed  HEAD:  Atraumatic, Normocephalic  EYES: EOMI, PERRLA, conjunctiva and sclera clear  ENMT: No tonsillar erythema, exudates, or enlargement; Moist mucous membranes, Good dentition, No lesions  NECK: Supple, No JVD, Normal thyroid  NERVOUS SYSTEM:  Alert & Oriented X3, Good concentration; Motor Strength 5/5 B/L upper and lower extremities; DTRs 2+ intact and symmetric  CHEST/LUNG: Clear to percussion bilaterally; No rales, rhonchi, wheezing, or rubs  HEART: Regular rate and rhythm; No murmurs, rubs, or gallops  ABDOMEN: Soft, Nontender, Nondistended; Bowel sounds present  EXTREMITIES:  2+ Peripheral Pulses, No clubbing, cyanosis, or edema  LYMPH: No lymphadenopathy noted  SKIN: No rashes or lesions    LABS:                        12.1   19.08 )-----------( 210      ( 26 Sep 2019 06:00 )             36.6     09-26    139  |  104  |  26<H>  ----------------------------<  290<H>  4.2   |  22  |  0.56    Ca    8.6      26 Sep 2019 06:00  Phos  3.0     09-26  Mg     1.8     09-26    TPro  6.5  /  Alb  2.9<L>  /  TBili  0.6  /  DBili  x   /  AST  8   /  ALT  8   /  AlkPhos  43  09-25        CAPILLARY BLOOD GLUCOSE      POCT Blood Glucose.: 346 mg/dL (26 Sep 2019 17:25)  POCT Blood Glucose.: 354 mg/dL (26 Sep 2019 12:01)  POCT Blood Glucose.: 271 mg/dL (26 Sep 2019 08:04)  POCT Blood Glucose.: 318 mg/dL (25 Sep 2019 21:29)            MEDICATIONS  (STANDING):  dextrose 5%. 1000 milliLiter(s) (50 mL/Hr) IV Continuous <Continuous>  dextrose 50% Injectable 12.5 Gram(s) IV Push once  dextrose 50% Injectable 25 Gram(s) IV Push once  dextrose 50% Injectable 25 Gram(s) IV Push once  influenza   Vaccine 0.5 milliLiter(s) IntraMuscular once  insulin glargine Injectable (LANTUS) 12 Unit(s) SubCutaneous at bedtime  insulin lispro (HumaLOG) corrective regimen sliding scale   SubCutaneous three times a day before meals  insulin lispro (HumaLOG) corrective regimen sliding scale   SubCutaneous at bedtime  insulin lispro Injectable (HumaLOG) 5 Unit(s) SubCutaneous three times a day before meals  piperacillin/tazobactam IVPB.. 3.375 Gram(s) IV Intermittent every 8 hours  vancomycin  IVPB 1000 milliGRAM(s) IV Intermittent every 12 hours    MEDICATIONS  (PRN):  acetaminophen   Tablet .. 650 milliGRAM(s) Oral every 6 hours PRN Mild Pain (1 - 3)  dextrose 40% Gel 15 Gram(s) Oral once PRN Blood Glucose LESS THAN 70 milliGRAM(s)/deciliter  glucagon  Injectable 1 milliGRAM(s) IntraMuscular once PRN Glucose LESS THAN 70 milligrams/deciliter  morphine  - Injectable 4 milliGRAM(s) IV Push every 4 hours PRN moderate to severe pain (4-10)      Care Discussed with Consultants/Other Providers [ ] YES  [ ] NO

## 2019-09-26 NOTE — PROGRESS NOTE ADULT - PROBLEM SELECTOR PLAN 1
likely from pharangitis and sialoangitis of submandibular gland   -IV abx   -seen by ENT   -now tolerating PO   -blood c/s neg so far  -started on zosyn and vanco

## 2019-09-27 DIAGNOSIS — I10 ESSENTIAL (PRIMARY) HYPERTENSION: ICD-10-CM

## 2019-09-27 DIAGNOSIS — E78.49 OTHER HYPERLIPIDEMIA: ICD-10-CM

## 2019-09-27 LAB
ALBUMIN SERPL ELPH-MCNC: 2.7 G/DL — LOW (ref 3.3–5)
ALP SERPL-CCNC: 36 U/L — LOW (ref 40–120)
ALT FLD-CCNC: 11 U/L — SIGNIFICANT CHANGE UP (ref 4–41)
ANION GAP SERPL CALC-SCNC: 10 MMO/L — SIGNIFICANT CHANGE UP (ref 7–14)
ANISOCYTOSIS BLD QL: SLIGHT — SIGNIFICANT CHANGE UP
AST SERPL-CCNC: 9 U/L — SIGNIFICANT CHANGE UP (ref 4–40)
BASOPHILS # BLD AUTO: 0.07 K/UL — SIGNIFICANT CHANGE UP (ref 0–0.2)
BASOPHILS NFR BLD AUTO: 0.5 % — SIGNIFICANT CHANGE UP (ref 0–2)
BASOPHILS NFR SPEC: 0 % — SIGNIFICANT CHANGE UP (ref 0–2)
BILIRUB SERPL-MCNC: 0.6 MG/DL — SIGNIFICANT CHANGE UP (ref 0.2–1.2)
BUN SERPL-MCNC: 22 MG/DL — SIGNIFICANT CHANGE UP (ref 7–23)
CALCIUM SERPL-MCNC: 8.5 MG/DL — SIGNIFICANT CHANGE UP (ref 8.4–10.5)
CHLORIDE SERPL-SCNC: 100 MMOL/L — SIGNIFICANT CHANGE UP (ref 98–107)
CO2 SERPL-SCNC: 22 MMOL/L — SIGNIFICANT CHANGE UP (ref 22–31)
CREAT SERPL-MCNC: 0.72 MG/DL — SIGNIFICANT CHANGE UP (ref 0.5–1.3)
EOSINOPHIL # BLD AUTO: 0.09 K/UL — SIGNIFICANT CHANGE UP (ref 0–0.5)
EOSINOPHIL NFR BLD AUTO: 0.7 % — SIGNIFICANT CHANGE UP (ref 0–6)
EOSINOPHIL NFR FLD: 1 % — SIGNIFICANT CHANGE UP (ref 0–6)
GLUCOSE SERPL-MCNC: 317 MG/DL — HIGH (ref 70–99)
HBA1C BLD-MCNC: 11.6 % — HIGH (ref 4–5.6)
HCT VFR BLD CALC: 38.2 % — LOW (ref 39–50)
HGB BLD-MCNC: 13 G/DL — SIGNIFICANT CHANGE UP (ref 13–17)
IMM GRANULOCYTES NFR BLD AUTO: 4.1 % — HIGH (ref 0–1.5)
LYMPHOCYTES # BLD AUTO: 28.2 % — SIGNIFICANT CHANGE UP (ref 13–44)
LYMPHOCYTES # BLD AUTO: 3.89 K/UL — HIGH (ref 1–3.3)
LYMPHOCYTES NFR SPEC AUTO: 18 % — SIGNIFICANT CHANGE UP (ref 13–44)
MANUAL SMEAR VERIFICATION: SIGNIFICANT CHANGE UP
MCHC RBC-ENTMCNC: 25.1 PG — LOW (ref 27–34)
MCHC RBC-ENTMCNC: 34 % — SIGNIFICANT CHANGE UP (ref 32–36)
MCV RBC AUTO: 73.7 FL — LOW (ref 80–100)
MICROCYTES BLD QL: SLIGHT — SIGNIFICANT CHANGE UP
MONOCYTES # BLD AUTO: 1.65 K/UL — HIGH (ref 0–0.9)
MONOCYTES NFR BLD AUTO: 11.9 % — SIGNIFICANT CHANGE UP (ref 2–14)
MONOCYTES NFR BLD: 12 % — HIGH (ref 2–9)
NEUTROPHIL AB SER-ACNC: 62 % — SIGNIFICANT CHANGE UP (ref 43–77)
NEUTROPHILS # BLD AUTO: 7.54 K/UL — HIGH (ref 1.8–7.4)
NEUTROPHILS NFR BLD AUTO: 54.6 % — SIGNIFICANT CHANGE UP (ref 43–77)
NEUTS BAND # BLD: 2 % — SIGNIFICANT CHANGE UP (ref 0–6)
NRBC # BLD: 0 /100WBC — SIGNIFICANT CHANGE UP
NRBC # FLD: 0 K/UL — SIGNIFICANT CHANGE UP (ref 0–0)
OVALOCYTES BLD QL SMEAR: SLIGHT — SIGNIFICANT CHANGE UP
PLATELET # BLD AUTO: 214 K/UL — SIGNIFICANT CHANGE UP (ref 150–400)
PLATELET COUNT - ESTIMATE: NORMAL — SIGNIFICANT CHANGE UP
PMV BLD: 10.9 FL — SIGNIFICANT CHANGE UP (ref 7–13)
POTASSIUM SERPL-MCNC: 4 MMOL/L — SIGNIFICANT CHANGE UP (ref 3.5–5.3)
POTASSIUM SERPL-SCNC: 4 MMOL/L — SIGNIFICANT CHANGE UP (ref 3.5–5.3)
PROT SERPL-MCNC: 6.1 G/DL — SIGNIFICANT CHANGE UP (ref 6–8.3)
RBC # BLD: 5.18 M/UL — SIGNIFICANT CHANGE UP (ref 4.2–5.8)
RBC # FLD: 15 % — HIGH (ref 10.3–14.5)
SODIUM SERPL-SCNC: 132 MMOL/L — LOW (ref 135–145)
VARIANT LYMPHS # BLD: 5 % — SIGNIFICANT CHANGE UP
WBC # BLD: 13.81 K/UL — HIGH (ref 3.8–10.5)
WBC # FLD AUTO: 13.81 K/UL — HIGH (ref 3.8–10.5)

## 2019-09-27 PROCEDURE — 70491 CT SOFT TISSUE NECK W/DYE: CPT | Mod: 26

## 2019-09-27 PROCEDURE — 70487 CT MAXILLOFACIAL W/DYE: CPT | Mod: 26

## 2019-09-27 PROCEDURE — 99223 1ST HOSP IP/OBS HIGH 75: CPT | Mod: GC

## 2019-09-27 PROCEDURE — 99222 1ST HOSP IP/OBS MODERATE 55: CPT

## 2019-09-27 RX ORDER — KETOROLAC TROMETHAMINE 30 MG/ML
15 SYRINGE (ML) INJECTION ONCE
Refills: 0 | Status: DISCONTINUED | OUTPATIENT
Start: 2019-09-27 | End: 2019-09-28

## 2019-09-27 RX ORDER — INSULIN GLARGINE 100 [IU]/ML
17 INJECTION, SOLUTION SUBCUTANEOUS AT BEDTIME
Refills: 0 | Status: DISCONTINUED | OUTPATIENT
Start: 2019-09-27 | End: 2019-09-27

## 2019-09-27 RX ORDER — INSULIN LISPRO 100/ML
VIAL (ML) SUBCUTANEOUS AT BEDTIME
Refills: 0 | Status: DISCONTINUED | OUTPATIENT
Start: 2019-09-27 | End: 2019-10-03

## 2019-09-27 RX ORDER — KETOROLAC TROMETHAMINE 30 MG/ML
15 SYRINGE (ML) INJECTION ONCE
Refills: 0 | Status: DISCONTINUED | OUTPATIENT
Start: 2019-09-27 | End: 2019-09-27

## 2019-09-27 RX ORDER — INSULIN GLARGINE 100 [IU]/ML
20 INJECTION, SOLUTION SUBCUTANEOUS AT BEDTIME
Refills: 0 | Status: DISCONTINUED | OUTPATIENT
Start: 2019-09-27 | End: 2019-09-28

## 2019-09-27 RX ORDER — INSULIN LISPRO 100/ML
VIAL (ML) SUBCUTANEOUS
Refills: 0 | Status: DISCONTINUED | OUTPATIENT
Start: 2019-09-27 | End: 2019-10-03

## 2019-09-27 RX ORDER — INSULIN LISPRO 100/ML
6 VIAL (ML) SUBCUTANEOUS
Refills: 0 | Status: DISCONTINUED | OUTPATIENT
Start: 2019-09-27 | End: 2019-09-28

## 2019-09-27 RX ADMIN — Medication 15 MILLIGRAM(S): at 09:10

## 2019-09-27 RX ADMIN — Medication 2: at 22:13

## 2019-09-27 RX ADMIN — Medication 15 MILLIGRAM(S): at 18:55

## 2019-09-27 RX ADMIN — PIPERACILLIN AND TAZOBACTAM 25 GRAM(S): 4; .5 INJECTION, POWDER, LYOPHILIZED, FOR SOLUTION INTRAVENOUS at 02:08

## 2019-09-27 RX ADMIN — Medication 6 UNIT(S): at 17:47

## 2019-09-27 RX ADMIN — Medication 2: at 12:58

## 2019-09-27 RX ADMIN — Medication 5 UNIT(S): at 09:10

## 2019-09-27 RX ADMIN — Medication 15 MILLIGRAM(S): at 00:12

## 2019-09-27 RX ADMIN — PIPERACILLIN AND TAZOBACTAM 25 GRAM(S): 4; .5 INJECTION, POWDER, LYOPHILIZED, FOR SOLUTION INTRAVENOUS at 18:55

## 2019-09-27 RX ADMIN — Medication 250 MILLIGRAM(S): at 00:04

## 2019-09-27 RX ADMIN — Medication 15 MILLIGRAM(S): at 20:34

## 2019-09-27 RX ADMIN — Medication 8: at 17:47

## 2019-09-27 RX ADMIN — Medication 3: at 09:11

## 2019-09-27 RX ADMIN — Medication 15 MILLIGRAM(S): at 00:27

## 2019-09-27 RX ADMIN — PIPERACILLIN AND TAZOBACTAM 25 GRAM(S): 4; .5 INJECTION, POWDER, LYOPHILIZED, FOR SOLUTION INTRAVENOUS at 10:07

## 2019-09-27 RX ADMIN — Medication 15 MILLIGRAM(S): at 09:40

## 2019-09-27 RX ADMIN — MORPHINE SULFATE 4 MILLIGRAM(S): 50 CAPSULE, EXTENDED RELEASE ORAL at 06:10

## 2019-09-27 RX ADMIN — Medication 250 MILLIGRAM(S): at 14:13

## 2019-09-27 RX ADMIN — Medication 5 UNIT(S): at 12:57

## 2019-09-27 RX ADMIN — MORPHINE SULFATE 4 MILLIGRAM(S): 50 CAPSULE, EXTENDED RELEASE ORAL at 05:55

## 2019-09-27 RX ADMIN — INSULIN GLARGINE 20 UNIT(S): 100 INJECTION, SOLUTION SUBCUTANEOUS at 22:14

## 2019-09-27 NOTE — PROGRESS NOTE ADULT - SUBJECTIVE AND OBJECTIVE BOX
Patient is a 54y old  Male who presents with a chief complaint of dysphagia (27 Sep 2019 15:07)    pt. toño nd examined, still having some throat pain     INTERVAL HPI/OVERNIGHT EVENTS:  T(C): 37.6 (09-27-19 @ 21:51), Max: 37.9 (09-27-19 @ 21:37)  HR: 105 (09-27-19 @ 21:51) (82 - 105)  BP: 119/85 (09-27-19 @ 21:51) (119/85 - 138/88)  RR: 17 (09-27-19 @ 21:51) (17 - 18)  SpO2: 94% (09-27-19 @ 21:51) (94% - 97%)  Wt(kg): --  I&O's Summary      PAST MEDICAL & SURGICAL HISTORY:  Type 2 diabetes mellitus  No significant past surgical history      SOCIAL HISTORY  Alcohol:  Tobacco:  Illicit substance use:    FAMILY HISTORY:    REVIEW OF SYSTEMS:  CONSTITUTIONAL: No fever, weight loss, or fatigue  EYES: No eye pain, visual disturbances, or discharge  ENMT:  No difficulty hearing, tinnitus, vertigo; No sinus or throat pain  NECK: No pain or stiffness  RESPIRATORY: No cough, wheezing, chills or hemoptysis; No shortness of breath  CARDIOVASCULAR: No chest pain, palpitations, dizziness, or leg swelling  GASTROINTESTINAL: No abdominal or epigastric pain. No nausea, vomiting, or hematemesis; No diarrhea or constipation. No melena or hematochezia.  GENITOURINARY: No dysuria, frequency, hematuria, or incontinence  NEUROLOGICAL: No headaches, memory loss, loss of strength, numbness, or tremors  SKIN: No itching, burning, rashes, or lesions   LYMPH NODES: No enlarged glands  ENDOCRINE: No heat or cold intolerance; No hair loss  MUSCULOSKELETAL: No joint pain or swelling; No muscle, back, or extremity pain  PSYCHIATRIC: No depression, anxiety, mood swings, or difficulty sleeping  HEME/LYMPH: No easy bruising, or bleeding gums  ALLERY AND IMMUNOLOGIC: No hives or eczema    RADIOLOGY & ADDITIONAL TESTS:    Imaging Personally Reviewed:  [ ] YES  [ ] NO    Consultant(s) Notes Reviewed:  [ ] YES  [ ] NO    PHYSICAL EXAM:  GENERAL: NAD, well-groomed, well-developed  HEAD:  Atraumatic, Normocephalic  EYES: EOMI, PERRLA, conjunctiva and sclera clear  ENMT: No tonsillar erythema, exudates, or enlargement; Moist mucous membranes, Good dentition, No lesions  NECK: Supple, No JVD, Normal thyroid  NERVOUS SYSTEM:  Alert & Oriented X3, Good concentration; Motor Strength 5/5 B/L upper and lower extremities; DTRs 2+ intact and symmetric  CHEST/LUNG: Clear to percussion bilaterally; No rales, rhonchi, wheezing, or rubs  HEART: Regular rate and rhythm; No murmurs, rubs, or gallops  ABDOMEN: Soft, Nontender, Nondistended; Bowel sounds present  EXTREMITIES:  2+ Peripheral Pulses, No clubbing, cyanosis, or edema  LYMPH: No lymphadenopathy noted  SKIN: No rashes or lesions    LABS:                        13.0   13.81 )-----------( 214      ( 27 Sep 2019 05:36 )             38.2     09-27    132<L>  |  100  |  22  ----------------------------<  317<H>  4.0   |  22  |  0.72    Ca    8.5      27 Sep 2019 05:36  Phos  3.0     09-26  Mg     1.8     09-26    TPro  6.1  /  Alb  2.7<L>  /  TBili  0.6  /  DBili  x   /  AST  9   /  ALT  11  /  AlkPhos  36<L>  09-27        CAPILLARY BLOOD GLUCOSE      POCT Blood Glucose.: 283 mg/dL (27 Sep 2019 21:23)  POCT Blood Glucose.: 331 mg/dL (27 Sep 2019 17:22)  POCT Blood Glucose.: 249 mg/dL (27 Sep 2019 12:08)  POCT Blood Glucose.: 287 mg/dL (27 Sep 2019 08:48)            MEDICATIONS  (STANDING):  dextrose 5%. 1000 milliLiter(s) (50 mL/Hr) IV Continuous <Continuous>  dextrose 50% Injectable 12.5 Gram(s) IV Push once  dextrose 50% Injectable 25 Gram(s) IV Push once  dextrose 50% Injectable 25 Gram(s) IV Push once  influenza   Vaccine 0.5 milliLiter(s) IntraMuscular once  insulin glargine Injectable (LANTUS) 20 Unit(s) SubCutaneous at bedtime  insulin lispro (HumaLOG) corrective regimen sliding scale   SubCutaneous three times a day before meals  insulin lispro (HumaLOG) corrective regimen sliding scale   SubCutaneous at bedtime  insulin lispro Injectable (HumaLOG) 6 Unit(s) SubCutaneous three times a day before meals  piperacillin/tazobactam IVPB.. 3.375 Gram(s) IV Intermittent every 8 hours  vancomycin  IVPB 1000 milliGRAM(s) IV Intermittent every 12 hours    MEDICATIONS  (PRN):  acetaminophen   Tablet .. 650 milliGRAM(s) Oral every 6 hours PRN Mild Pain (1 - 3)  dextrose 40% Gel 15 Gram(s) Oral once PRN Blood Glucose LESS THAN 70 milliGRAM(s)/deciliter  glucagon  Injectable 1 milliGRAM(s) IntraMuscular once PRN Glucose LESS THAN 70 milligrams/deciliter  morphine  - Injectable 4 milliGRAM(s) IV Push every 4 hours PRN moderate to severe pain (4-10)      Care Discussed with Consultants/Other Providers [ ] YES  [ ] NO

## 2019-09-27 NOTE — CONSULT NOTE ADULT - SUBJECTIVE AND OBJECTIVE BOX
Anabella Katz - [phone]    Patient is a 54y old  Male who presents with a chief complaint of dysphagia (27 Sep 2019 14:13)    HPI:  54M with T2DM who presents to [facility] 9/23/19 with 2 days of sorethroat.  At that time, was not able to tolerate any PO.  Was started on antibiotics immediately for right peritonsillar infection/abscess.  He was then transferred to [facility] for ENT evaluation.  CT with severe pharyngitis with inflammatory change tracking to the right submandibular space and along the right sternocleidomastoid muscle; Overlying cellulitis in the right cheek; Right peritonsillar abscess measures 1.8 x 1 cm. This is located along the anterior-superior-lateral margin of the right palatine tonsil;  Trace retropharyngeal edema. No retropharyngeal phlegmon or abscess. He was started on decadron and unasyn/clinda that was changed to vancomycin and zosyn     ID asked to help management.      He feels a little better but still has significant pain.  Swallowing better and can eat.  No fever.  Last dental evaluation was 3-4 months ago and an upper tooth was pulled.  Two years ago, a lower tooth was pulled.  he was not recently placed on antibiotics by the dentist.  no trauma to the face     prior hospital charts reviewed [ x ]  primary team notes reviewed [ x ]  other consultant notes reviewed [ x ]    PAST MEDICAL & SURGICAL HISTORY:  Type 2 diabetes mellitus  at least 2 dental extractions     Allergies  No Known Allergies    ANTIMICROBIAL:  ampicillin/sulbactam  IVPB x1 9/23/19  clindamycin IVPB (9/23-26)  piperacillin/tazobactam IVPB.. 3.375 every 8 hours (9/26-)  vancomycin  IVPB 1000 every 12 hours (9/26-)    MEDICATIONS  (STANDING):  influenza   Vaccine 0.5 once  insulin glargine Injectable (LANTUS) 17 at bedtime  insulin lispro (HumaLOG) corrective regimen sliding scale  three times a day before meals  insulin lispro (HumaLOG) corrective regimen sliding scale  at bedtime  insulin lispro Injectable (HumaLOG) 6 three times a day before meals  PRN  acetaminophen   Tablet .. 650 milliGRAM(s) Oral every 6 hours PRN  dextrose 40% Gel 15 Gram(s) Oral once PRN  glucagon  Injectable 1 milliGRAM(s) IntraMuscular once PRN  morphine  - Injectable 4 milliGRAM(s) IV Push every 4 hours PRN    SOCIAL HISTORY:   non-smoker, lives with his wife; works in construction    FAMILY HISTORY:  FH: type 2 diabetes    REVIEW OF SYSTEMS  [  ] ROS unobtainable because:    [ x ] All other systems negative except as noted below:	    Constitutional:  [ ] fever [ ] chills  [ ] weight loss  [ ] weakness  Skin:  [ ] rash [ ] phlebitis	  Eyes: [ ] icterus [ ] pain  [ ] discharge	  ENMT: [ x] sore throat  [ ] thrush [ ] ulcers [x] R facial pain/swelling  Respiratory: [ ] dyspnea [ ] hemoptysis [ ] cough [ ] sputum	  Cardiovascular:  [ ] chest pain [ ] palpitations [ ] edema	  Gastrointestinal:  [ ] nausea [ ] vomiting [ ] diarrhea [ ] constipation [ ] pain	  Genitourinary:  [ ] dysuria [ ] frequency [ ] hematuria [ ] discharge [ ] flank pain  [ ] incontinence  Musculoskeletal:  [ ] myalgias [ ] arthralgias [ ] arthritis  [ ] back pain  Neurological:  [ ] headache [ ] seizures  [ ] confusion/altered mental status  Psychiatric:  [ ] anxiety [ ] depression	  Hematology/Lymphatics:  [ ] lymphadenopathy  Endocrine:  [ ] adrenal [ ] thyroid  Allergic/Immunologic:	 [ ] transplant [ ] seasonal    Vital Signs Last 24 Hrs  T(F): 98.7 (09-27-19 @ 14:31), Max: 99.6 (09-23-19 @ 11:15)  Vital Signs Last 24 Hrs  HR: 105 (09-27-19 @ 14:31) (69 - 105)  BP: 134/90 (09-27-19 @ 14:31) (126/81 - 150/94)  RR: 18 (09-27-19 @ 14:31)  SpO2: 95% (09-27-19 @ 14:31) (95% - 97%)  Wt(kg): --    PHYSICAL EXAM:  General: non-toxic  HEAD/EYES: pterygium OS; marked swelling R face/parotid with tenderness and swelling; no drainage  ENT:  supple  Cardiovascular:   S1, S2  Respiratory:  clear bilaterally  GI:  soft, non-tender, normal bowel sounds  :  no craig  Musculoskeletal:  no synovitis  Neurologic:  grossly non-focal  Skin:  no rash  Psychiatric:  appropriate affect  Vascular:  no phlebitis    Hemoglobin A1C, Whole Blood: 11.6: % (09.27.19 @ 05:36)    WBC Count: 13.81 (09-27-19 @ 05:36)  WBC Count: 19.08 (09-26-19 @ 06:00)  WBC Count: 19.63 (09-25-19 @ 05:35)  WBC Count: 19.60 (09-24-19 @ 13:00)  WBC Count: 18.95 (09-23-19 @ 15:52)  WBC Count: 18.05 (09-22-19 @ 19:43)               13.0   13.81 )-----------( 214      ( 27 Sep 2019 05:36 )             38.2   132<L>  |  100  |  22  ----------------------------<  317<H>  4.0   |  22  |  0.72    Ca    8.5      27 Sep 2019 05:36  Phos  3.0     09-26  Mg     1.8     09-26    TPro  6.1  /  Alb  2.7<L>  /  TBili  0.6  /  DBili  x   /  AST  9   /  ALT  11  /  AlkPhos  36<L>  09-27    MICROBIOLOGY:  Vancomycin Level, Trough: 1.8 (09-26 @ 22:18)    Culture - Blood (collected 24 Sep 2019 15:31)  Source: BLOOD VENOUS  Preliminary Report (26 Sep 2019 15:31):    NO ORGANISMS ISOLATED    NO ORGANISMS ISOLATED AT 48 HRS.    Culture - Blood (collected 24 Sep 2019 15:31)  Source: BLOOD PERIPHERAL  Preliminary Report (26 Sep 2019 15:31):    NO ORGANISMS ISOLATED    NO ORGANISMS ISOLATED AT 48 HRS.    Culture - Wound (collected 23 Sep 2019 20:30)  Source: OTHER  Final Report (26 Sep 2019 10:01):    MODERATE    ИРИНА^Streptococcus anginosus    GRAADI^Granulicatella adiacens      HPARA^Haemophilus parainfluenzae    Culture - Blood (collected 23 Sep 2019 01:08)  Source: .Blood  Preliminary Report (24 Sep 2019 02:01):    No growth to date.    Culture - Blood (collected 23 Sep 2019 01:08)  Source: .Blood  Preliminary Report (24 Sep 2019 02:01):    No growth to date.    RADIOLOGY:  imaging below personally reviewed    CT Maxillofacial w/ IV Cont (09.22.19 @ 22:42) >  IMPRESSION:   CT maxillofacial:  1.  Mild diffuse inflammatory mucosal disease in the paranasal sinuses.  No air-fluid level to indicate acute.  2.  Bone resorption in the alveolar ridge of the mandible on the left side is likely related to periodontal disease.    CT neck:  1.  Severe right-sided pharyngitis with inflammatory change tracking to the right submandibular space and along the right sternocleidomastoid muscle.  Overlying cellulitis in the right cheek.  2.  Right peritonsillar abscess measures 1.8 x 1 cm.  This is located along the anterior-superior-lateral margin of the right palatine tonsil.  3.  Trace retropharyngeal edema.  No retropharyngeal phlegmon or abscess.    Xray Chest 1 View- PORTABLE-Urgent (09.22.19 @ 20:24) >  IMPRESSION: Clear lungs.
HPI:  Patient is a 54y Male with PMH significant for DM who presents from outside hospital with sore throat x 2 days and inability to tolerate PO. States he notices changes to his voice. Denies respiratory distress, stridor or stertor. No ear pain, + trismus. Was started on IV unasyn and CT scan performed revealing R PTA.    WBC 18.95  CT neck:   1. Severe right-sided pharyngitis with inflammatory change tracking to the   right submandibular space and along the right sternocleidomastoid muscle.   Overlying cellulitis in the right cheek.   2. Right peritonsillar abscess measures 1.8 x 1 cm. This is located along   the anterior-superior-lateral margin of the right palatine tonsil.   3. Trace retropharyngeal edema. No retropharyngeal phlegmon or abscess.       Physical Exam  T(C): 36.7 (09-23-19 @ 16:00), Max: 37.6 (09-23-19 @ 11:15)  HR: 94 (09-23-19 @ 16:00) (94 - 115)  BP: 124/79 (09-23-19 @ 16:00) (102/63 - 125/75)  RR: 16 (09-23-19 @ 16:00) (16 - 24)  SpO2: 98% (09-23-19 @ 16:00) (93% - 98%)  General: NAD, A+Ox3  No respiratory distress, stridor, or stertor  Voice quality: normal  Face:  Symmetric without masses or lesions  OU: PERRL, EOMI  Nose: nasal cavity clear bilaterally, inferior turbinates normal, mucosa normal without crusting or bleeding  OC/OP: mucosa dry, tongue midline, floor of mouth soft, no masses or lesions, soft palate with right bulging and fluctuance, bilateral tonsils 2-3+ with exudate and white debris.   Neck: Right submandibular gland induration, no overlying skin changes, no purulence from stensen's or merry's duct bilaterally, R LAD, full neck ROM  CNII-XII intact
HPI:  54M h/o T2DM, HTN, HLD p/w peritonsilar abscess now on  vanc/zosyn and s/p decadron.      Endo: T2DM for 10 years with hgb a1c of 11.6 (states it was 17 previously) on metformin 500mg BID, follows PCP no endocrinologist. Has tried glipizde in the past but was switched to metformin because it was not working well. He has been working on his diet and exercises 30 mins a day on bike.   Breakfast- wholewheat bread with egss. Lunch Peas with fish. Dinner Wholewheat bread with eggs. Does not snack in the day, denies juice/soda.   Checks ACBK FS, usually in low 200s. He said he has lost 15 lbs since starting a better diet and exercising for the past 3 months.   FH of diabetes in both parents.   Opthal -1.5 years ago, states it was normal. Denies numbness/tingling.       PAST MEDICAL & SURGICAL HISTORY:  Type 2 diabetes mellitus  No significant past surgical history      FAMILY HISTORY:  FH: type 2 diabetes father and mother       Social History: No hx of tobacco or etoh abuse.     Outpatient Medications: Home Medications:  aspirin 81 mg oral delayed release tablet: 1 tab(s) orally once a day (24 Sep 2019 17:22)  glyburide-metformin 5 mg-500 mg oral tablet: 2 tab(s) orally 2 times a day (24 Sep 2019 17:22)  simvastatin 5 mg oral tablet: 1 tab(s) orally once a day (at bedtime) (24 Sep 2019 17:22)      MEDICATIONS  (STANDING):  dextrose 5%. 1000 milliLiter(s) (50 mL/Hr) IV Continuous <Continuous>  dextrose 50% Injectable 12.5 Gram(s) IV Push once  dextrose 50% Injectable 25 Gram(s) IV Push once  dextrose 50% Injectable 25 Gram(s) IV Push once  influenza   Vaccine 0.5 milliLiter(s) IntraMuscular once  insulin glargine Injectable (LANTUS) 12 Unit(s) SubCutaneous at bedtime  insulin lispro (HumaLOG) corrective regimen sliding scale   SubCutaneous three times a day before meals  insulin lispro (HumaLOG) corrective regimen sliding scale   SubCutaneous at bedtime  insulin lispro Injectable (HumaLOG) 5 Unit(s) SubCutaneous three times a day before meals  piperacillin/tazobactam IVPB.. 3.375 Gram(s) IV Intermittent every 8 hours  vancomycin  IVPB 1000 milliGRAM(s) IV Intermittent every 12 hours    MEDICATIONS  (PRN):  acetaminophen   Tablet .. 650 milliGRAM(s) Oral every 6 hours PRN Mild Pain (1 - 3)  dextrose 40% Gel 15 Gram(s) Oral once PRN Blood Glucose LESS THAN 70 milliGRAM(s)/deciliter  glucagon  Injectable 1 milliGRAM(s) IntraMuscular once PRN Glucose LESS THAN 70 milligrams/deciliter  morphine  - Injectable 4 milliGRAM(s) IV Push every 4 hours PRN moderate to severe pain (4-10)      Allergies    No Known Allergies    Intolerances      Review of Systems:  Constitutional: No fever, chills   Neuro: No tremors, headache   Cardiovascular: No chest pain, palpitations  Respiratory: No SOB, no cough  GI: No nausea, vomiting, abdominal pain  : No dysuria, polyuria   Skin: no rash, ulcers   Psych: no depression, anxiety   Endocrine: no polyphagia, polydipsia     ALL OTHER SYSTEMS REVIEWED AND NEGATIVE        PHYSICAL EXAM:  VITALS: T(C): 37 (09-27-19 @ 10:06)  T(F): 98.6 (09-27-19 @ 10:06), Max: 99.1 (09-27-19 @ 02:01)  HR: 88 (09-27-19 @ 10:06) (69 - 97)  BP: 128/94 (09-27-19 @ 10:06) (126/81 - 150/94)  RR:  (16 - 18)  SpO2:  (95% - 97%)  Wt(kg): --  GENERAL: NAD, well-groomed, well-developed  EYES: No proptosis, anicteric  HEENT:  Atraumatic, Normocephalic, moist mucous membranes  RESPIRATORY: Clear to auscultation bilaterally; No rales, rhonchi, wheezing  CARDIOVASCULAR: Regular rate and rhythm; No murmurs; no peripheral edema  GI: Soft, nontender, non distended, normal bowel sounds  SKIN: Dry, intact, No rashes or lesions. No stigmata of cushings   NEURO: AOx3, moves all extremities spontaneously   PSYCH: Reactive affect, euthymic mood    POCT Blood Glucose.: 249 mg/dL (09-27-19 @ 12:08)  POCT Blood Glucose.: 287 mg/dL (09-27-19 @ 08:48)  POCT Blood Glucose.: 296 mg/dL (09-26-19 @ 22:25)  POCT Blood Glucose.: 346 mg/dL (09-26-19 @ 17:25)  POCT Blood Glucose.: 354 mg/dL (09-26-19 @ 12:01)  POCT Blood Glucose.: 271 mg/dL (09-26-19 @ 08:04)  POCT Blood Glucose.: 318 mg/dL (09-25-19 @ 21:29)  POCT Blood Glucose.: 278 mg/dL (09-25-19 @ 17:16)  POCT Blood Glucose.: 321 mg/dL (09-25-19 @ 12:03)  POCT Blood Glucose.: 267 mg/dL (09-25-19 @ 08:20)  POCT Blood Glucose.: 303 mg/dL (09-24-19 @ 21:45)  POCT Blood Glucose.: 322 mg/dL (09-24-19 @ 17:35)                            13.0   13.81 )-----------( 214      ( 27 Sep 2019 05:36 )             38.2       09-27    132<L>  |  100  |  22  ----------------------------<  317<H>  4.0   |  22  |  0.72    EGFR if : 123  EGFR if non : 107    Ca    8.5      09-27  Mg     1.8     09-26  Phos  3.0     09-26    TPro  6.1  /  Alb  2.7<L>  /  TBili  0.6  /  DBili  x   /  AST  9   /  ALT  11  /  AlkPhos  36<L>  09-27      Thyroid Function Tests:      Hemoglobin A1C, Whole Blood: 11.6 % <H> [4.0 - 5.6] (09-27-19 @ 05:36)  Hemoglobin A1C, Whole Blood: 12.0 % <H> [4.0 - 5.6] (09-26-19 @ 21:10)            Radiology:

## 2019-09-27 NOTE — CONSULT NOTE ADULT - ATTENDING COMMENTS
Uncontrolled DM2. While inpatient increase insulin plan as outlined.  Discharge plan basal bolus insulin pens plus metformin 1000mg BID.

## 2019-09-27 NOTE — CONSULT NOTE ADULT - ASSESSMENT
54M with poorly controlled diabetes mellitus here with R peritonsillar abscess and marked inflammatory changes.  Cultures +Streptococcus anginosus +Granulicatella  +Haemophilus parainfluenzae.  Current antibiotics are okay.  Source control    Suggest;  - agree with CT neck to f/u abscess and to evaluate for anything drainable  - vancomycin zosyn is okay for now  - will transition to oral antibiotics once he is better    Please call the ID service [phone] with questions or concerns over the weekend

## 2019-09-27 NOTE — CONSULT NOTE ADULT - ASSESSMENT
54M h/o T2DM, HTN, HLD p/w peritonsilar abscess now on  vanc/zosyn and s/p decadron. 54M h/o T2DM uncontrolled, HTN, HLD p/w peritonsilar abscess now on  vanc/zosyn and s/p decadron.  HbA1c 11.6%.

## 2019-09-27 NOTE — CONSULT NOTE ADULT - PROBLEM SELECTOR RECOMMENDATION 9
Hgb a1c of 11.6 uncontrolled on metformin 500mg BID.   - change lantus to 17 units qhs   - change humalog to 6 units TIDAC   - change sliding scale to moderate dose sliding scale   - RD consult  (order in)   - Provider to RN insulin pen teach (order in)     Discharge  [facility]  [address] [place] [address] [address], [place] [id number] [phone] Hgb a1c of 11.6 uncontrolled on metformin 500mg BID.   - change lantus to 20 units qhs   - change humalog to 6 units TIDAC   - change sliding scale to moderate dose sliding scale   - RD consult  (order in)   - Provider to RN insulin pen teach (order in)     Discharge  [facility]  Dr. Carrasquillo 12/3 11:15AM   [address] [address]  Migel Herndon Diabetes educator 10/28 11AM   [address] [id number] [phone]

## 2019-09-27 NOTE — PROGRESS NOTE ADULT - PROBLEM SELECTOR PLAN 1
likely from pharangitis and sialoangitis of submandibular gland   -IV abx   -seen by ENT   -now tolerating PO   -blood c/s neg so far  -started on zosyn and vanco  CT neck with contrast to evaluate for any abscess

## 2019-09-27 NOTE — PROGRESS NOTE ADULT - SUBJECTIVE AND OBJECTIVE BOX
Patient seen and examined at the bedside. Patient complains of severe pain. Able to tolerate PO after pain medication, however still with pain with swallowing.     PE:  NAD, awake and alert  No respiratory distress, stridor, or stertor  Voice quality: normal  Face:  Symmetric without masses or lesions  OU: PERRL, EOMI  Nose: nasal cavity clear bilaterally, inferior turbinates normal, mucosa normal without crusting or bleeding  OC/OP: mucosa dry, tongue midline, floor of mouth soft, no masses or lesions, soft palate with decreased right bulging, no fluctuance, moderate TTP, bilateral tonsils 2+ with small exudate and white debris.   Neck: Right submandibular gland induration, fullness, right submental fullness, ttp, no overlying skin changes, no purulence from stensen's or merry's duct bilaterally, R LAD, full neck ROM  CNII-XII intact    A/P:  54M with hx of DM with R PTA s/p I&D and submandibular gland swelling, possible sialadenitis vs. reactive from PTA.   - Abx broaden to vanc/zosyn  - Recommend repeat CT scan   - Massages of R SMG  - Aggressive PO vs IV hydration  - Warm compresses to R SMG   - Sialogogues   - Strict glucose control  - Broaden to vanc/ zosyn    - Warm salt water rinses TID after meals  - Diet as tolerated  - will follow for continued improvement of symptoms

## 2019-09-28 PROBLEM — E11.9 TYPE 2 DIABETES MELLITUS WITHOUT COMPLICATIONS: Chronic | Status: ACTIVE | Noted: 2019-09-23

## 2019-09-28 LAB
ANION GAP SERPL CALC-SCNC: 14 MMO/L — SIGNIFICANT CHANGE UP (ref 7–14)
BUN SERPL-MCNC: 18 MG/DL — SIGNIFICANT CHANGE UP (ref 7–23)
CALCIUM SERPL-MCNC: 8.8 MG/DL — SIGNIFICANT CHANGE UP (ref 8.4–10.5)
CHLORIDE SERPL-SCNC: 98 MMOL/L — SIGNIFICANT CHANGE UP (ref 98–107)
CO2 SERPL-SCNC: 23 MMOL/L — SIGNIFICANT CHANGE UP (ref 22–31)
CREAT SERPL-MCNC: 0.67 MG/DL — SIGNIFICANT CHANGE UP (ref 0.5–1.3)
CULTURE RESULTS: SIGNIFICANT CHANGE UP
CULTURE RESULTS: SIGNIFICANT CHANGE UP
GLUCOSE SERPL-MCNC: 301 MG/DL — HIGH (ref 70–99)
HCT VFR BLD CALC: 38.4 % — LOW (ref 39–50)
HGB BLD-MCNC: 12.9 G/DL — LOW (ref 13–17)
MAGNESIUM SERPL-MCNC: 1.8 MG/DL — SIGNIFICANT CHANGE UP (ref 1.6–2.6)
MCHC RBC-ENTMCNC: 25.2 PG — LOW (ref 27–34)
MCHC RBC-ENTMCNC: 33.6 % — SIGNIFICANT CHANGE UP (ref 32–36)
MCV RBC AUTO: 75.1 FL — LOW (ref 80–100)
NRBC # FLD: 0 K/UL — SIGNIFICANT CHANGE UP (ref 0–0)
PHOSPHATE SERPL-MCNC: 3.4 MG/DL — SIGNIFICANT CHANGE UP (ref 2.5–4.5)
PLATELET # BLD AUTO: 217 K/UL — SIGNIFICANT CHANGE UP (ref 150–400)
PMV BLD: 10.3 FL — SIGNIFICANT CHANGE UP (ref 7–13)
POTASSIUM SERPL-MCNC: 4.4 MMOL/L — SIGNIFICANT CHANGE UP (ref 3.5–5.3)
POTASSIUM SERPL-SCNC: 4.4 MMOL/L — SIGNIFICANT CHANGE UP (ref 3.5–5.3)
RBC # BLD: 5.11 M/UL — SIGNIFICANT CHANGE UP (ref 4.2–5.8)
RBC # FLD: 14.7 % — HIGH (ref 10.3–14.5)
SODIUM SERPL-SCNC: 135 MMOL/L — SIGNIFICANT CHANGE UP (ref 135–145)
SPECIMEN SOURCE: SIGNIFICANT CHANGE UP
SPECIMEN SOURCE: SIGNIFICANT CHANGE UP
VANCOMYCIN TROUGH SERPL-MCNC: 4 UG/ML — LOW (ref 10–20)
WBC # BLD: 14.05 K/UL — HIGH (ref 3.8–10.5)
WBC # FLD AUTO: 14.05 K/UL — HIGH (ref 3.8–10.5)

## 2019-09-28 PROCEDURE — 99233 SBSQ HOSP IP/OBS HIGH 50: CPT | Mod: GC

## 2019-09-28 PROCEDURE — 99232 SBSQ HOSP IP/OBS MODERATE 35: CPT

## 2019-09-28 RX ORDER — IBUPROFEN 200 MG
600 TABLET ORAL ONCE
Refills: 0 | Status: COMPLETED | OUTPATIENT
Start: 2019-09-28 | End: 2019-09-28

## 2019-09-28 RX ORDER — VANCOMYCIN HCL 1 G
1250 VIAL (EA) INTRAVENOUS EVERY 12 HOURS
Refills: 0 | Status: DISCONTINUED | OUTPATIENT
Start: 2019-09-29 | End: 2019-09-30

## 2019-09-28 RX ORDER — VANCOMYCIN HCL 1 G
VIAL (EA) INTRAVENOUS
Refills: 0 | Status: DISCONTINUED | OUTPATIENT
Start: 2019-09-28 | End: 2019-09-30

## 2019-09-28 RX ORDER — ACETAMINOPHEN 500 MG
1000 TABLET ORAL ONCE
Refills: 0 | Status: COMPLETED | OUTPATIENT
Start: 2019-09-28 | End: 2019-09-28

## 2019-09-28 RX ORDER — INSULIN LISPRO 100/ML
8 VIAL (ML) SUBCUTANEOUS
Refills: 0 | Status: DISCONTINUED | OUTPATIENT
Start: 2019-09-28 | End: 2019-09-29

## 2019-09-28 RX ORDER — VANCOMYCIN HCL 1 G
1250 VIAL (EA) INTRAVENOUS ONCE
Refills: 0 | Status: COMPLETED | OUTPATIENT
Start: 2019-09-28 | End: 2019-09-28

## 2019-09-28 RX ORDER — INSULIN GLARGINE 100 [IU]/ML
24 INJECTION, SOLUTION SUBCUTANEOUS AT BEDTIME
Refills: 0 | Status: DISCONTINUED | OUTPATIENT
Start: 2019-09-28 | End: 2019-09-29

## 2019-09-28 RX ORDER — SODIUM CHLORIDE 9 MG/ML
1000 INJECTION INTRAMUSCULAR; INTRAVENOUS; SUBCUTANEOUS
Refills: 0 | Status: DISCONTINUED | OUTPATIENT
Start: 2019-09-28 | End: 2019-10-01

## 2019-09-28 RX ADMIN — Medication 2: at 22:53

## 2019-09-28 RX ADMIN — Medication 8 UNIT(S): at 17:09

## 2019-09-28 RX ADMIN — INSULIN GLARGINE 24 UNIT(S): 100 INJECTION, SOLUTION SUBCUTANEOUS at 22:53

## 2019-09-28 RX ADMIN — Medication 600 MILLIGRAM(S): at 16:34

## 2019-09-28 RX ADMIN — Medication 400 MILLIGRAM(S): at 12:01

## 2019-09-28 RX ADMIN — PIPERACILLIN AND TAZOBACTAM 25 GRAM(S): 4; .5 INJECTION, POWDER, LYOPHILIZED, FOR SOLUTION INTRAVENOUS at 17:46

## 2019-09-28 RX ADMIN — PIPERACILLIN AND TAZOBACTAM 25 GRAM(S): 4; .5 INJECTION, POWDER, LYOPHILIZED, FOR SOLUTION INTRAVENOUS at 01:54

## 2019-09-28 RX ADMIN — Medication 166.67 MILLIGRAM(S): at 21:12

## 2019-09-28 RX ADMIN — Medication 15 MILLIGRAM(S): at 02:10

## 2019-09-28 RX ADMIN — Medication 6 UNIT(S): at 08:54

## 2019-09-28 RX ADMIN — Medication 6: at 17:09

## 2019-09-28 RX ADMIN — Medication 400 MILLIGRAM(S): at 22:09

## 2019-09-28 RX ADMIN — Medication 250 MILLIGRAM(S): at 01:36

## 2019-09-28 RX ADMIN — Medication 6 UNIT(S): at 12:02

## 2019-09-28 RX ADMIN — SODIUM CHLORIDE 50 MILLILITER(S): 9 INJECTION INTRAMUSCULAR; INTRAVENOUS; SUBCUTANEOUS at 17:52

## 2019-09-28 RX ADMIN — Medication 15 MILLIGRAM(S): at 01:35

## 2019-09-28 RX ADMIN — PIPERACILLIN AND TAZOBACTAM 25 GRAM(S): 4; .5 INJECTION, POWDER, LYOPHILIZED, FOR SOLUTION INTRAVENOUS at 11:17

## 2019-09-28 RX ADMIN — Medication 1000 MILLIGRAM(S): at 12:06

## 2019-09-28 RX ADMIN — SODIUM CHLORIDE 50 MILLILITER(S): 9 INJECTION INTRAMUSCULAR; INTRAVENOUS; SUBCUTANEOUS at 22:09

## 2019-09-28 RX ADMIN — Medication 4: at 12:06

## 2019-09-28 RX ADMIN — Medication 250 MILLIGRAM(S): at 15:51

## 2019-09-28 RX ADMIN — Medication 1000 MILLIGRAM(S): at 22:30

## 2019-09-28 RX ADMIN — Medication 6: at 08:54

## 2019-09-28 NOTE — PROGRESS NOTE ADULT - PROBLEM SELECTOR PLAN 1
likely from pharangitis and sialoangitis of submandibular gland   -IV abx   -seen by ENT   -now tolerating PO   -blood c/s neg so far  -started on zosyn and vanco  CT neck shows decreasing size of abscess however soft tissue inflammation is extending : ENT to f/u , will c/w IV abx for now

## 2019-09-28 NOTE — DIETITIAN INITIAL EVALUATION ADULT. - PERTINENT LABORATORY DATA
09-28 Na135 mmol/L Glu 301 mg/dL<H> K+ 4.4 mmol/L Cr  0.67 mg/dL BUN 18 mg/dL 09-28 Phos 3.4 mg/dL 09-27 Alb 2.7 g/dL<L> 09-27 [id number] 11.6 %<H>

## 2019-09-28 NOTE — DIETITIAN INITIAL EVALUATION ADULT. - PERTINENT MEDS FT
dextrose 5%.  dextrose 50% Injectable  dextrose 50% Injectable  dextrose 50% Injectable  ibuprofen  Tablet.  insulin glargine Injectable (LANTUS)  insulin lispro (HumaLOG) corrective regimen sliding scale  insulin lispro (HumaLOG) corrective regimen sliding scale  insulin lispro Injectable (HumaLOG)  sodium chloride 0.9%.

## 2019-09-28 NOTE — PROGRESS NOTE ADULT - ASSESSMENT
54M h/o T2DM uncontrolled, HTN, HLD p/w peritonsilar abscess now on vanc/zosyn and s/p decadron.  HbA1c 11.6%.

## 2019-09-28 NOTE — PROGRESS NOTE ADULT - ASSESSMENT
54M with poorly controlled diabetes mellitus here with R peritonsillar abscess and facial/neck cellulitis s/p drainage by ENT and   Cultures +Streptococcus anginosus +Granulicatella  +Haemophilus parainfluenzae.   repeat CT with decreased size of the abscess but increased soft tissue inflammatory changes, the  fluid extending to , submandibular spaces with involvement of the R pharyngeal wall and posterior nasal pharynx    * c/w zosyn 3.375 q 8  * c/w vanco 1 q 12 for now  * the soft tissue inflammation is actually worsening and extending after 5-6 days of IV antibiotics (clinda 9/23-9/26 then vanco and zosyn 9/26 now day 3)  * f/u with ENT for further drainage

## 2019-09-28 NOTE — PROGRESS NOTE ADULT - SUBJECTIVE AND OBJECTIVE BOX
Patient is a 54y old  Male who presents with a chief complaint of dysphagia (28 Sep 2019 15:31)    pt. seen and examined, still having throat pain     INTERVAL HPI/OVERNIGHT EVENTS:  T(C): 37 (09-28-19 @ 14:30), Max: 37.3 (09-28-19 @ 10:00)  HR: 98 (09-28-19 @ 14:30) (87 - 104)  BP: 138/76 (09-28-19 @ 14:30) (134/90 - 148/92)  RR: 18 (09-28-19 @ 14:30) (17 - 20)  SpO2: 98% (09-28-19 @ 14:30) (97% - 100%)  Wt(kg): --  I&O's Summary    28 Sep 2019 07:01  -  28 Sep 2019 22:56  --------------------------------------------------------  IN: 250 mL / OUT: 0 mL / NET: 250 mL        PAST MEDICAL & SURGICAL HISTORY:  Type 2 diabetes mellitus  No significant past surgical history      SOCIAL HISTORY  Alcohol:  Tobacco:  Illicit substance use:    FAMILY HISTORY:    REVIEW OF SYSTEMS:  CONSTITUTIONAL: No fever, weight loss, or fatigue  EYES: No eye pain, visual disturbances, or discharge  ENMT:  No difficulty hearing, tinnitus, vertigo; No sinus or throat pain  NECK: No pain or stiffness  RESPIRATORY: No cough, wheezing, chills or hemoptysis; No shortness of breath  CARDIOVASCULAR: No chest pain, palpitations, dizziness, or leg swelling  GASTROINTESTINAL: No abdominal or epigastric pain. No nausea, vomiting, or hematemesis; No diarrhea or constipation. No melena or hematochezia.  GENITOURINARY: No dysuria, frequency, hematuria, or incontinence  NEUROLOGICAL: No headaches, memory loss, loss of strength, numbness, or tremors  SKIN: No itching, burning, rashes, or lesions   LYMPH NODES: No enlarged glands  ENDOCRINE: No heat or cold intolerance; No hair loss  MUSCULOSKELETAL: No joint pain or swelling; No muscle, back, or extremity pain  PSYCHIATRIC: No depression, anxiety, mood swings, or difficulty sleeping  HEME/LYMPH: No easy bruising, or bleeding gums  ALLERY AND IMMUNOLOGIC: No hives or eczema    RADIOLOGY & ADDITIONAL TESTS:    Imaging Personally Reviewed:  [ ] YES  [ ] NO    Consultant(s) Notes Reviewed:  [ ] YES  [ ] NO    PHYSICAL EXAM:  GENERAL: NAD, well-groomed, well-developed  HEAD:  Atraumatic, Normocephalic  EYES: EOMI, PERRLA, conjunctiva and sclera clear  ENMT: No tonsillar erythema, exudates, or enlargement; Moist mucous membranes, Good dentition, No lesions  NECK: Supple, No JVD, Normal thyroid  NERVOUS SYSTEM:  Alert & Oriented X3, Good concentration; Motor Strength 5/5 B/L upper and lower extremities; DTRs 2+ intact and symmetric  CHEST/LUNG: Clear to percussion bilaterally; No rales, rhonchi, wheezing, or rubs  HEART: Regular rate and rhythm; No murmurs, rubs, or gallops  ABDOMEN: Soft, Nontender, Nondistended; Bowel sounds present  EXTREMITIES:  2+ Peripheral Pulses, No clubbing, cyanosis, or edema  LYMPH: No lymphadenopathy noted  SKIN: No rashes or lesions    LABS:                        12.9   14.05 )-----------( 217      ( 28 Sep 2019 06:35 )             38.4     09-28    135  |  98  |  18  ----------------------------<  301<H>  4.4   |  23  |  0.67    Ca    8.8      28 Sep 2019 06:35  Phos  3.4     09-28  Mg     1.8     09-28    TPro  6.1  /  Alb  2.7<L>  /  TBili  0.6  /  DBili  x   /  AST  9   /  ALT  11  /  AlkPhos  36<L>  09-27        CAPILLARY BLOOD GLUCOSE      POCT Blood Glucose.: 299 mg/dL (28 Sep 2019 22:06)  POCT Blood Glucose.: 269 mg/dL (28 Sep 2019 16:40)  POCT Blood Glucose.: 235 mg/dL (28 Sep 2019 11:57)  POCT Blood Glucose.: 277 mg/dL (28 Sep 2019 08:12)            MEDICATIONS  (STANDING):  dextrose 5%. 1000 milliLiter(s) (50 mL/Hr) IV Continuous <Continuous>  dextrose 50% Injectable 12.5 Gram(s) IV Push once  dextrose 50% Injectable 25 Gram(s) IV Push once  dextrose 50% Injectable 25 Gram(s) IV Push once  influenza   Vaccine 0.5 milliLiter(s) IntraMuscular once  insulin glargine Injectable (LANTUS) 24 Unit(s) SubCutaneous at bedtime  insulin lispro (HumaLOG) corrective regimen sliding scale   SubCutaneous three times a day before meals  insulin lispro (HumaLOG) corrective regimen sliding scale   SubCutaneous at bedtime  insulin lispro Injectable (HumaLOG) 8 Unit(s) SubCutaneous three times a day before meals  piperacillin/tazobactam IVPB.. 3.375 Gram(s) IV Intermittent every 8 hours  sodium chloride 0.9%. 1000 milliLiter(s) (50 mL/Hr) IV Continuous <Continuous>  vancomycin  IVPB        MEDICATIONS  (PRN):  acetaminophen   Tablet .. 650 milliGRAM(s) Oral every 6 hours PRN Mild Pain (1 - 3)  dextrose 40% Gel 15 Gram(s) Oral once PRN Blood Glucose LESS THAN 70 milliGRAM(s)/deciliter  glucagon  Injectable 1 milliGRAM(s) IntraMuscular once PRN Glucose LESS THAN 70 milligrams/deciliter  morphine  - Injectable 4 milliGRAM(s) IV Push every 4 hours PRN moderate to severe pain (4-10)      Care Discussed with Consultants/Other Providers [ ] YES  [ ] NO

## 2019-09-28 NOTE — PROGRESS NOTE ADULT - PROBLEM SELECTOR PLAN 1
Hgb a1c of 11.6 uncontrolled on metformin 500mg BID.   - increased lantus to 24 units qhs   - increased humalog to 8 units TIDAC   - continue moderate Humalog correctional scale before meals and bed  - F/U RD consult    - Provider to RN insulin pen teach (order in)     Discharge  Basal/bolus insulin, doses TBD, alone with Metformin   [facility]  Dr. Carrasquillo 12/3 11:15AM   [address] [address]  Migel Herndon Diabetes educator 10/28 11AM   [address] [id number] [phone].

## 2019-09-28 NOTE — PROGRESS NOTE ADULT - SUBJECTIVE AND OBJECTIVE BOX
Patient seen and examined at the bedside Feels pain is improving. WBC downtrending. Repeat CT yesterday pending final read.    Vital Signs Last 24 Hrs  T(C): 36.8 (28 Sep 2019 05:54), Max: 37.9 (27 Sep 2019 21:37)  T(F): 98.3 (28 Sep 2019 05:54), Max: 100.2 (27 Sep 2019 21:37)  HR: 87 (28 Sep 2019 05:54) (87 - 105)  BP: 138/86 (28 Sep 2019 05:54) (119/85 - 138/86)  BP(mean): --  RR: 17 (28 Sep 2019 05:54) (17 - 18)  SpO2: 97% (28 Sep 2019 05:54) (94% - 97%)    NAD, awake and alert  No respiratory distress, stridor, or stertor  Voice quality: normal  Face:  Symmetric without masses or lesions  OU: PERRL, EOMI  Nose: nasal cavity clear bilaterally, inferior turbinates normal, mucosa normal without crusting or bleeding  OC/OP: mucosa dry, tongue midline, floor of mouth soft, no masses or lesions, soft palate with decreased right bulging, no fluctuance, moderate TTP, bilateral tonsils 2+ with small exudate and white debris. Purulent material expressed from previous PTA drainage site.  Neck: Right submandibular gland induration, fullness, right submental fullness, ttp, no overlying skin changes, no purulence from stensen's or merry's duct bilaterally, R LAD, full neck ROM  CNII-XII intact    A/P:  54M with hx of DM with R PTA s/p I&D and submandibular gland swelling, possible sialadenitis vs. reactive from PTA.     - Abx broaden to vanc/zosyn  - Recommend repeat CT scan   - Massages of R SMG  - Aggressive PO vs IV hydration  - Warm compresses to R SMG   - Sialogogues   - Strict glucose control  - Broaden to vanc/ zosyn    - Warm salt water rinses TID after meals  - Diet as tolerated  - will follow for continued improvement of symptoms Patient seen and examined at the bedside Feels pain is improving. WBC downtrending. Repeat CT yesterday pending final read.    Vital Signs Last 24 Hrs  T(C): 36.8 (28 Sep 2019 05:54), Max: 37.9 (27 Sep 2019 21:37)  T(F): 98.3 (28 Sep 2019 05:54), Max: 100.2 (27 Sep 2019 21:37)  HR: 87 (28 Sep 2019 05:54) (87 - 105)  BP: 138/86 (28 Sep 2019 05:54) (119/85 - 138/86)  BP(mean): --  RR: 17 (28 Sep 2019 05:54) (17 - 18)  SpO2: 97% (28 Sep 2019 05:54) (94% - 97%)    NAD, awake and alert  No respiratory distress, stridor, or stertor  Voice quality: normal  Face:  Symmetric without masses or lesions  OU: PERRL, EOMI  Nose: nasal cavity clear bilaterally, inferior turbinates normal, mucosa normal without crusting or bleeding  OC/OP: mucosa dry, tongue midline, floor of mouth soft, no masses or lesions, soft palate with decreased right bulging, no fluctuance, moderate TTP, bilateral tonsils 2+ with small exudate and white debris. Purulent material expressed from previous PTA drainage site.  Neck: Right submandibular gland induration, fullness, right submental fullness, ttp, no overlying skin changes, no purulence from stensen's or merry's duct bilaterally, R LAD, full neck ROM  CNII-XII intact    A/P:  54M with hx of DM with R PTA s/p I&D and submandibular gland swelling, possible sialadenitis vs. reactive from PTA.     - Continue vanc/zosyn per ID  - F/U repeat CT final read   - Massages of R SMG  - Aggressive PO vs IV hydration  - Warm compresses to R SMG   - Sialogogues   - Strict glucose control  - Broaden to vanc/ zosyn    - Warm salt water rinses TID after meals  - Diet as tolerated  - will follow for continued improvement of symptoms

## 2019-09-28 NOTE — DIETITIAN INITIAL EVALUATION ADULT. - ADD RECOMMEND
1. Monitor weights, labs, BM's, skin integrity, p.o. intake. 2. Please Encourage po intake, assist with meals and menu selections, provide alternatives PRN. 3. Suggest outpatient follow up with an endocrinologist to ensure long-term DM diet comprehension and compliance. Suggest outpatient follow up with appropriate RD for the purposes of long-term nutrition evaluation and diet education.

## 2019-09-28 NOTE — DIETITIAN INITIAL EVALUATION ADULT. - PROBLEM SELECTOR PLAN 1
2/2 PTA   -c/w clindamycin q8, f/u tissue culture, obtain blood cultures, tylenol and morphine prn for pain, c/w IVF  - Decadron x3 doses total   - Massage of R SMG  - Warm compresses to R SMG   - Sialogogues   - Strict glucose control  - Clindamycin TID, broaden abx if spikes fevers or clinically worsens   - Warm salt water rinses TID after meals  - soft diet as tolerated

## 2019-09-28 NOTE — PROGRESS NOTE ADULT - SUBJECTIVE AND OBJECTIVE BOX
Chief Complaint: DM 2 uncontrolled with hyperglycemia     History: Patient reports poor food intake - but glucose remains elevated with hyperglycemia     MEDICATIONS  (STANDING):  dextrose 5%. 1000 milliLiter(s) (50 mL/Hr) IV Continuous <Continuous>  dextrose 50% Injectable 12.5 Gram(s) IV Push once  dextrose 50% Injectable 25 Gram(s) IV Push once  dextrose 50% Injectable 25 Gram(s) IV Push once  ibuprofen  Tablet. 600 milliGRAM(s) Oral once  influenza   Vaccine 0.5 milliLiter(s) IntraMuscular once  insulin glargine Injectable (LANTUS) 20 Unit(s) SubCutaneous at bedtime  insulin lispro (HumaLOG) corrective regimen sliding scale   SubCutaneous three times a day before meals  insulin lispro (HumaLOG) corrective regimen sliding scale   SubCutaneous at bedtime  insulin lispro Injectable (HumaLOG) 6 Unit(s) SubCutaneous three times a day before meals  piperacillin/tazobactam IVPB.. 3.375 Gram(s) IV Intermittent every 8 hours  vancomycin  IVPB 1000 milliGRAM(s) IV Intermittent every 12 hours    MEDICATIONS  (PRN):  acetaminophen   Tablet .. 650 milliGRAM(s) Oral every 6 hours PRN Mild Pain (1 - 3)  dextrose 40% Gel 15 Gram(s) Oral once PRN Blood Glucose LESS THAN 70 milliGRAM(s)/deciliter  glucagon  Injectable 1 milliGRAM(s) IntraMuscular once PRN Glucose LESS THAN 70 milligrams/deciliter  morphine  - Injectable 4 milliGRAM(s) IV Push every 4 hours PRN moderate to severe pain (4-10)        No Known Allergies          Review of Systems:  Constitutional: No fever  Cardiovascular: No chest pain, palpitations  Respiratory: No SOB, no cough  GI: Poor food intake, denies N/V    PHYSICAL EXAM:  VITALS: T(C): 37.3 (09-28-19 @ 10:00)  T(F): 99.1 (09-28-19 @ 10:00), Max: 100.2 (09-27-19 @ 21:37)  HR: 104 (09-28-19 @ 10:00) (87 - 105)  BP: 148/92 (09-28-19 @ 10:00) (119/85 - 148/92)  RR:  (17 - 20)  SpO2:  (94% - 100%)  Wt(kg): --  GENERAL: NAD, sitting up in bed, talking with family   RESPIRATORY: Non labored  GI: Soft, nontender, non distended  NEURO: A+Ox3, moves all extremities spontaneously   PSYCH: Reactive affect    CAPILLARY BLOOD GLUCOSE      POCT Blood Glucose.: 235 mg/dL (28 Sep 2019 11:57)  POCT Blood Glucose.: 277 mg/dL (28 Sep 2019 08:12)  POCT Blood Glucose.: 283 mg/dL (27 Sep 2019 21:23)  POCT Blood Glucose.: 331 mg/dL (27 Sep 2019 17:22)      09-28    135  |  98  |  18  ----------------------------<  301<H>  4.4   |  23  |  0.67    EGFR if : 127  EGFR if non : 110    Ca    8.8      09-28  Mg     1.8     09-28  Phos  3.4     09-28    TPro  6.1  /  Alb  2.7<L>  /  TBili  0.6  /  DBili  x   /  AST  9   /  ALT  11  /  AlkPhos  36<L>  09-27          Hemoglobin A1C, Whole Blood: 11.6 % <H> [4.0 - 5.6] (09-27-19 @ 05:36)  Hemoglobin A1C, Whole Blood: 12.0 % <H> [4.0 - 5.6] (09-26-19 @ 21:10)

## 2019-09-29 ENCOUNTER — TRANSCRIPTION ENCOUNTER (OUTPATIENT)
Age: 54
End: 2019-09-29

## 2019-09-29 LAB
ANION GAP SERPL CALC-SCNC: 10 MMO/L — SIGNIFICANT CHANGE UP (ref 7–14)
BACTERIA BLD CULT: SIGNIFICANT CHANGE UP
BACTERIA BLD CULT: SIGNIFICANT CHANGE UP
BUN SERPL-MCNC: 16 MG/DL — SIGNIFICANT CHANGE UP (ref 7–23)
CALCIUM SERPL-MCNC: 8.7 MG/DL — SIGNIFICANT CHANGE UP (ref 8.4–10.5)
CHLORIDE SERPL-SCNC: 102 MMOL/L — SIGNIFICANT CHANGE UP (ref 98–107)
CO2 SERPL-SCNC: 23 MMOL/L — SIGNIFICANT CHANGE UP (ref 22–31)
CREAT SERPL-MCNC: 0.72 MG/DL — SIGNIFICANT CHANGE UP (ref 0.5–1.3)
GLUCOSE SERPL-MCNC: 306 MG/DL — HIGH (ref 70–99)
HCT VFR BLD CALC: 37.5 % — LOW (ref 39–50)
HGB BLD-MCNC: 12.6 G/DL — LOW (ref 13–17)
MAGNESIUM SERPL-MCNC: 1.6 MG/DL — SIGNIFICANT CHANGE UP (ref 1.6–2.6)
MCHC RBC-ENTMCNC: 25.3 PG — LOW (ref 27–34)
MCHC RBC-ENTMCNC: 33.6 % — SIGNIFICANT CHANGE UP (ref 32–36)
MCV RBC AUTO: 75.3 FL — LOW (ref 80–100)
NRBC # FLD: 0.03 K/UL — SIGNIFICANT CHANGE UP (ref 0–0)
PHOSPHATE SERPL-MCNC: 3.2 MG/DL — SIGNIFICANT CHANGE UP (ref 2.5–4.5)
PLATELET # BLD AUTO: 228 K/UL — SIGNIFICANT CHANGE UP (ref 150–400)
PMV BLD: 11 FL — SIGNIFICANT CHANGE UP (ref 7–13)
POTASSIUM SERPL-MCNC: 4.4 MMOL/L — SIGNIFICANT CHANGE UP (ref 3.5–5.3)
POTASSIUM SERPL-SCNC: 4.4 MMOL/L — SIGNIFICANT CHANGE UP (ref 3.5–5.3)
RBC # BLD: 4.98 M/UL — SIGNIFICANT CHANGE UP (ref 4.2–5.8)
RBC # FLD: 14.9 % — HIGH (ref 10.3–14.5)
SODIUM SERPL-SCNC: 135 MMOL/L — SIGNIFICANT CHANGE UP (ref 135–145)
WBC # BLD: 13.11 K/UL — HIGH (ref 3.8–10.5)
WBC # FLD AUTO: 13.11 K/UL — HIGH (ref 3.8–10.5)

## 2019-09-29 PROCEDURE — 99232 SBSQ HOSP IP/OBS MODERATE 35: CPT

## 2019-09-29 RX ORDER — IBUPROFEN 200 MG
600 TABLET ORAL ONCE
Refills: 0 | Status: COMPLETED | OUTPATIENT
Start: 2019-09-29 | End: 2019-09-29

## 2019-09-29 RX ORDER — ACETAMINOPHEN 500 MG
1000 TABLET ORAL ONCE
Refills: 0 | Status: COMPLETED | OUTPATIENT
Start: 2019-09-29 | End: 2019-09-29

## 2019-09-29 RX ORDER — INSULIN LISPRO 100/ML
12 VIAL (ML) SUBCUTANEOUS
Refills: 0 | Status: DISCONTINUED | OUTPATIENT
Start: 2019-09-29 | End: 2019-09-30

## 2019-09-29 RX ORDER — INSULIN GLARGINE 100 [IU]/ML
32 INJECTION, SOLUTION SUBCUTANEOUS AT BEDTIME
Refills: 0 | Status: DISCONTINUED | OUTPATIENT
Start: 2019-09-29 | End: 2019-09-30

## 2019-09-29 RX ADMIN — Medication 166.67 MILLIGRAM(S): at 06:30

## 2019-09-29 RX ADMIN — Medication 4: at 17:13

## 2019-09-29 RX ADMIN — INSULIN GLARGINE 32 UNIT(S): 100 INJECTION, SOLUTION SUBCUTANEOUS at 22:33

## 2019-09-29 RX ADMIN — SODIUM CHLORIDE 50 MILLILITER(S): 9 INJECTION INTRAMUSCULAR; INTRAVENOUS; SUBCUTANEOUS at 11:21

## 2019-09-29 RX ADMIN — PIPERACILLIN AND TAZOBACTAM 25 GRAM(S): 4; .5 INJECTION, POWDER, LYOPHILIZED, FOR SOLUTION INTRAVENOUS at 02:53

## 2019-09-29 RX ADMIN — Medication 1000 MILLIGRAM(S): at 13:48

## 2019-09-29 RX ADMIN — PIPERACILLIN AND TAZOBACTAM 25 GRAM(S): 4; .5 INJECTION, POWDER, LYOPHILIZED, FOR SOLUTION INTRAVENOUS at 11:21

## 2019-09-29 RX ADMIN — Medication 8 UNIT(S): at 09:00

## 2019-09-29 RX ADMIN — Medication 650 MILLIGRAM(S): at 06:30

## 2019-09-29 RX ADMIN — Medication 166.67 MILLIGRAM(S): at 18:40

## 2019-09-29 RX ADMIN — Medication 12 UNIT(S): at 12:27

## 2019-09-29 RX ADMIN — Medication 600 MILLIGRAM(S): at 13:25

## 2019-09-29 RX ADMIN — Medication 650 MILLIGRAM(S): at 05:50

## 2019-09-29 RX ADMIN — Medication 400 MILLIGRAM(S): at 13:25

## 2019-09-29 RX ADMIN — Medication 600 MILLIGRAM(S): at 14:20

## 2019-09-29 RX ADMIN — Medication 600 MILLIGRAM(S): at 22:42

## 2019-09-29 RX ADMIN — Medication 4: at 12:26

## 2019-09-29 RX ADMIN — Medication 12 UNIT(S): at 17:13

## 2019-09-29 RX ADMIN — Medication 400 MILLIGRAM(S): at 23:35

## 2019-09-29 RX ADMIN — Medication 8: at 09:00

## 2019-09-29 RX ADMIN — SODIUM CHLORIDE 50 MILLILITER(S): 9 INJECTION INTRAMUSCULAR; INTRAVENOUS; SUBCUTANEOUS at 09:00

## 2019-09-29 RX ADMIN — PIPERACILLIN AND TAZOBACTAM 25 GRAM(S): 4; .5 INJECTION, POWDER, LYOPHILIZED, FOR SOLUTION INTRAVENOUS at 20:00

## 2019-09-29 NOTE — DISCHARGE NOTE PROVIDER - HOSPITAL COURSE
54M h/o T2DM who presents from outside hospital with sore throat x 2 days and inability to tolerate PO, a/f sepsis 2/2 R PTA:          HOSPITAL COURSE    Sepsis 2/2 R PTA    - clindamycin, now on vanco/zosyn     - ENT cs    - IVF     - Decadron x3 doses total as per ENT - last dose 9/25    - Massage of R SMG    - Warm compresses to R SMG     - Sialogogues     - Warm salt water rinses TID after meals    - f/u rpt CT neck/maxillofacial: Worsening soft tissue inflammatory changes with a with decreased size of right peritonsillar abscess.. Increasing fluid extending into the      and submandibular spaces with involvement of the right     pharyngeal wall and posterior nasal pharynx    - ENT f/u - c/w supportive management        Acidosis - resolved 9/25     gap acidosis in setting of infection, suspect 2/2 inc lactate vs dec po intake    - IVF         DM2     - lantus, pre meals, sliding scale    - endo consulted - new to insulin, DC doses TBD         Dispo: 54M h/o T2DM who presents from outside hospital with sore throat x 2 days and inability to tolerate PO, a/f sepsis 2/2 R PTA:          HOSPITAL COURSE        Sepsis 2/2 R PTA    - clindamycin, now on vanco/zosyn     - ENT cs    - IVF     - Decadron x3 doses total as per ENT - last dose 9/25    - Massage of R SMG    - Warm compresses to R SMG     - Sialogogues     - Warm salt water rinses TID after meals    - f/u rpt CT neck/maxillofacial: Worsening soft tissue inflammatory changes with a with decreased size of right peritonsillar abscess.. Increasing fluid extending into the      and submandibular spaces with involvement of the right     pharyngeal wall and posterior nasal pharynx    - ENT f/u - c/w supportive management        Acidosis - resolved 9/25     gap acidosis in setting of infection, suspect 2/2 inc lactate vs dec po intake    - IVF         DM2     - lantus, pre meals, sliding scale    - endo consulted - new to insulin, DC doses TBD         Dispo: 54M h/o T2DM who presents from outside hospital with sore throat x 2 days and inability to tolerate PO, a/f sepsis 2/2 R PTA:          HOSPITAL COURSE        Sepsis    - Likely 2/2 pharangitis and sialoangitis of submandibular gland    - ENT consult    - s/p clindamycin --> WCx w/ Streptococcus anguinosus, granulicatella adjacens --> vanco/zosyn     - Decadron x3 doses total as per ENT - last dose 9/25    - c/w warm compresses, SCM massage and sialogogues     - Warm salt water rinses TID after meals    - CT neck/maxillofacial: Worsening soft tissue inflammatory changes with a with decreased size of right peritonsillar abscess.. Increasing fluid extending into the  and submandibular spaces with involvement of the right pharyngeal wall and posterior nasal pharynx    - Resolving with ABx --> On D/C change pt to doxycycline 100mg PO BID and Augmentin 875mg po BID until 10/11/19        DM type 2    - HbgA1c 11.6    - Endo consult --> New to insulin and will D/C on Lantus and HumaLOG     - lantus, pre meals, sliding scale    - Outpatient follow up with Endocrinology         Acidosis    - In setting of sepsis, resolved 9/25     - IVF            Dispo: Home        On 10-3 this case was reviewed with Dr. Merritt, the patient is medically stable and optimized for discharge. All medications were reviewed and prescriptions were sent to mutually agreed upon pharmacy.

## 2019-09-29 NOTE — DISCHARGE NOTE PROVIDER - CARE PROVIDER_API CALL
Griselda Carrasquillo)  EndocrinologyMetabDiabetes; Internal Medicine  [address], [address]  [place] [address]  Phone: [phone]  Fax: [phone]  Follow Up Time:     Pranav Palomo)  Otolaryngology  [address], [address]  [place] [address]  Phone: [phone]  Fax: [phone]  Follow Up Time:     Tong Roach)  Internal Medicine  [address] [place] [address]  Phone: [phone]  Fax: [phone]  Follow Up Time:

## 2019-09-29 NOTE — DISCHARGE NOTE PROVIDER - NSFOLLOWUPCLINICS_GEN_ALL_ED_FT
[facility] ENT  ENT  [address]  [place] [address]  Phone: [phone]  Fax:   Follow Up Time:

## 2019-09-29 NOTE — PROGRESS NOTE ADULT - SUBJECTIVE AND OBJECTIVE BOX
Chief Complaint: DM 2 uncontrolled with hyperglycemia     History: Patient reports low food intake - but glucose remains elevated with hyperglycemia     MEDICATIONS  (STANDING):  dextrose 5%. 1000 milliLiter(s) (50 mL/Hr) IV Continuous <Continuous>  dextrose 50% Injectable 12.5 Gram(s) IV Push once  dextrose 50% Injectable 25 Gram(s) IV Push once  dextrose 50% Injectable 25 Gram(s) IV Push once  influenza   Vaccine 0.5 milliLiter(s) IntraMuscular once  insulin glargine Injectable (LANTUS) 32 Unit(s) SubCutaneous at bedtime  insulin lispro (HumaLOG) corrective regimen sliding scale   SubCutaneous three times a day before meals  insulin lispro (HumaLOG) corrective regimen sliding scale   SubCutaneous at bedtime  insulin lispro Injectable (HumaLOG) 12 Unit(s) SubCutaneous three times a day before meals  piperacillin/tazobactam IVPB.. 3.375 Gram(s) IV Intermittent every 8 hours  sodium chloride 0.9%. 1000 milliLiter(s) (50 mL/Hr) IV Continuous <Continuous>  vancomycin  IVPB 1250 milliGRAM(s) IV Intermittent every 12 hours  vancomycin  IVPB        MEDICATIONS  (PRN):  acetaminophen   Tablet .. 650 milliGRAM(s) Oral every 6 hours PRN Mild Pain (1 - 3)  dextrose 40% Gel 15 Gram(s) Oral once PRN Blood Glucose LESS THAN 70 milliGRAM(s)/deciliter  glucagon  Injectable 1 milliGRAM(s) IntraMuscular once PRN Glucose LESS THAN 70 milligrams/deciliter  morphine  - Injectable 4 milliGRAM(s) IV Push every 4 hours PRN moderate to severe pain (4-10)      Allergies    No Known Allergies    Intolerances      Review of Systems:  Constitutional: No fever  Eyes: No blurry vision  Neuro: No tremors  HEENT: No pain  Cardiovascular: No chest pain, palpitations  Respiratory: No SOB, no cough  GI: No nausea, vomiting, abdominal pain  : No dysuria  Skin: no rash  Psych: no depression  Endocrine: no polyuria, polydipsia  Hem/lymph: no swelling  Osteoporosis: no fractures    ALL OTHER SYSTEMS REVIEWED AND NEGATIVE    UNABLE TO OBTAIN    PHYSICAL EXAM:  VITALS: T(C): 36.8 (09-29-19 @ 05:49)  T(F): 98.3 (09-29-19 @ 05:49), Max: 98.6 (09-28-19 @ 14:30)  HR: 82 (09-29-19 @ 06:49) (72 - 98)  BP: 124/86 (09-29-19 @ 06:49) (109/59 - 138/76)  RR:  (17 - 18)  SpO2:  (97% - 98%)  Wt(kg): --  GENERAL: NAD, well-groomed, well-developed  EYES: No proptosis, no lid lag, anicteric  HEENT:  Atraumatic, Normocephalic, moist mucous membranes  THYROID: Normal size, no palpable nodules  RESPIRATORY: Clear to auscultation bilaterally; No rales, rhonchi, wheezing  CARDIOVASCULAR: Regular rate and rhythm; No murmurs; no peripheral edema  GI: Soft, nontender, non distended  SKIN: Dry, intact, No rashes or lesions  MUSCULOSKELETAL: Full range of motion, normal strength  NEURO: extraocular movements intact, no tremor  PSYCH: Alert and oriented x 3, normal affect, normal mood      CAPILLARY BLOOD GLUCOSE      POCT Blood Glucose.: 319 mg/dL (29 Sep 2019 08:27)  POCT Blood Glucose.: 299 mg/dL (28 Sep 2019 22:06)  POCT Blood Glucose.: 269 mg/dL (28 Sep 2019 16:40)  POCT Blood Glucose.: 235 mg/dL (28 Sep 2019 11:57)      09-29    135  |  102  |  16  ----------------------------<  306<H>  4.4   |  23  |  0.72    EGFR if : 123  EGFR if non : 107    Ca    8.7      09-29  Mg     1.6     09-29  Phos  3.2     09-29    TPro  6.1  /  Alb  2.7<L>  /  TBili  0.6  /  DBili  x   /  AST  9   /  ALT  11  /  AlkPhos  36<L>  09-27          Thyroid Function Tests:      Hemoglobin A1C, Whole Blood: 11.6 % <H> [4.0 - 5.6] (09-27-19 @ 05:36)  Hemoglobin A1C, Whole Blood: 12.0 % <H> [4.0 - 5.6] (09-26-19 @ 21:10) Chief Complaint: DM 2 uncontrolled with hyperglycemia     History: Patient reports low food intake - but glucose remains elevated with hyperglycemia     MEDICATIONS  (STANDING):  dextrose 5%. 1000 milliLiter(s) (50 mL/Hr) IV Continuous <Continuous>  dextrose 50% Injectable 12.5 Gram(s) IV Push once  dextrose 50% Injectable 25 Gram(s) IV Push once  dextrose 50% Injectable 25 Gram(s) IV Push once  influenza   Vaccine 0.5 milliLiter(s) IntraMuscular once  insulin glargine Injectable (LANTUS) 32 Unit(s) SubCutaneous at bedtime  insulin lispro (HumaLOG) corrective regimen sliding scale   SubCutaneous three times a day before meals  insulin lispro (HumaLOG) corrective regimen sliding scale   SubCutaneous at bedtime  insulin lispro Injectable (HumaLOG) 12 Unit(s) SubCutaneous three times a day before meals  piperacillin/tazobactam IVPB.. 3.375 Gram(s) IV Intermittent every 8 hours  sodium chloride 0.9%. 1000 milliLiter(s) (50 mL/Hr) IV Continuous <Continuous>  vancomycin  IVPB 1250 milliGRAM(s) IV Intermittent every 12 hours  vancomycin  IVPB        MEDICATIONS  (PRN):  acetaminophen   Tablet .. 650 milliGRAM(s) Oral every 6 hours PRN Mild Pain (1 - 3)  dextrose 40% Gel 15 Gram(s) Oral once PRN Blood Glucose LESS THAN 70 milliGRAM(s)/deciliter  glucagon  Injectable 1 milliGRAM(s) IntraMuscular once PRN Glucose LESS THAN 70 milligrams/deciliter  morphine  - Injectable 4 milliGRAM(s) IV Push every 4 hours PRN moderate to severe pain (4-10)          No Known Allergies          Review of Systems:  Constitutional: No fever  Cardiovascular: No chest pain, palpitations  Respiratory: No SOB, no cough  GI: Poor food intake, denies N/V    PHYSICAL EXAM:  VITALS: T(C): 36.8 (09-29-19 @ 05:49)  T(F): 98.3 (09-29-19 @ 05:49), Max: 98.6 (09-28-19 @ 14:30)  HR: 82 (09-29-19 @ 06:49) (72 - 98)  BP: 124/86 (09-29-19 @ 06:49) (109/59 - 138/76)  RR:  (17 - 18)  SpO2:  (97% - 98%)  Wt(kg): --  GENERAL: NAD  RESPIRATORY: Non labored  GI: Soft, nontender, non distended  NEURO: A+Ox3, moves all extremities spontaneously   PSYCH: Reactive affect    CAPILLARY BLOOD GLUCOSE      POCT Blood Glucose.: 319 mg/dL (29 Sep 2019 08:27)  POCT Blood Glucose.: 299 mg/dL (28 Sep 2019 22:06)  POCT Blood Glucose.: 269 mg/dL (28 Sep 2019 16:40)  POCT Blood Glucose.: 235 mg/dL (28 Sep 2019 11:57)      09-29    135  |  102  |  16  ----------------------------<  306<H>  4.4   |  23  |  0.72    EGFR if : 123  EGFR if non : 107    Ca    8.7      09-29  Mg     1.6     09-29  Phos  3.2     09-29    TPro  6.1  /  Alb  2.7<L>  /  TBili  0.6  /  DBili  x   /  AST  9   /  ALT  11  /  AlkPhos  36<L>  09-27          Hemoglobin A1C, Whole Blood: 11.6 % <H> [4.0 - 5.6] (09-27-19 @ 05:36)  Hemoglobin A1C, Whole Blood: 12.0 % <H> [4.0 - 5.6] (09-26-19 @ 21:10)

## 2019-09-29 NOTE — DISCHARGE NOTE PROVIDER - PROVIDER TOKENS
PROVIDER:[TOKEN:[[id number]]],PROVIDER:[TOKEN:[[id number]]],PROVIDER:[TOKEN:[[id number]]]

## 2019-09-29 NOTE — DISCHARGE NOTE PROVIDER - NSDCFUADDINST_GEN_ALL_CORE_FT
**Please continue taking your Doxycycline and Augmentin until 10/11  **Please ensure strict caution when spending time in direct sun. It is advised that you use sunscreen and or wear longsleeves/pants, and wear a hat when outside for extended periods of time.

## 2019-09-29 NOTE — DISCHARGE NOTE PROVIDER - NSDCFUADDAPPT_GEN_ALL_CORE_FT
**Please follow up with the Infectious Disease and ENT teams in their clinics within 1 - 2 weeks of discharge.  Dr. Roach (ID), Phone: [phone] --> 2 - 3 weeks  Dr. Palomo, Phone: [phone] -->  1 - 2 weeks

## 2019-09-29 NOTE — DISCHARGE NOTE PROVIDER - NSDCCPCAREPLAN_GEN_ALL_CORE_FT
PRINCIPAL DISCHARGE DIAGNOSIS  Diagnosis: Peritonsillar abscess  Assessment and Plan of Treatment:       SECONDARY DISCHARGE DIAGNOSES  Diagnosis: Submandibular swelling  Assessment and Plan of Treatment: Continue with antibiotics as directed    Diagnosis: HTN (hypertension), benign  Assessment and Plan of Treatment: Continue blood pressure medication regimen as directed. Monitor for any visual changes, headaches or dizziness.  Monitor blood pressure regularly.  Follow up with your PCP for further management for high blood pressure.    Diagnosis: Type 2 diabetes mellitus without complication, without long-term current use of insulin  Assessment and Plan of Treatment: Continue your medication regimen and a consistent carbohydrate diet (Meaning eating the same amount of carbohydrates at the same time each day). Monitor blood glucose levels throughout the day before meals and at bedtime. Record blood sugars and bring to outpatient providers appointment in order to be reviewed by your doctor for management modifications. Monitor for signs/symptoms of low blood glucose, such as, dizziness, altered mental status, or cool/clammy skin. In addition, monitor for signs/symptoms of high blood glucose, such as, feeling hot, dry, fatigued, or with increased thirst/urination. Make regular podiatry appointments in order to have feet checked for wounds and uncontrolled toe nail growth to prevent infections, as well as, appointments with an ophthalmologist to monitor your vision.   PLEASE FOLLOW UP WITH:  [facility]  Dr. Carrasquillo 12/3 11:15AM   [address] [address]  Migel Herndon Diabetes educator 10/28 11AM   [address] [address]  [phone].      Diagnosis: Other hyperlipidemia  Assessment and Plan of Treatment: Continue cholesterol control medications. Continue DASH diet. Follow up with your PCP within 1 week of discharge for further management and monitoring of lipid and cholesterol panels. PRINCIPAL DISCHARGE DIAGNOSIS  Diagnosis: Peritonsillar abscess  Assessment and Plan of Treatment: Continue with antibiotics as directed. Continue with adequate oral hydration and oral lemon drops to prevent excessive drying of mouth. Continue with warm compresses to R jaw every 4 hours. Continue with salt water rinses after every meal. Please ensure strict glucose control as elevated glucose levels can cause worsening infection.         SECONDARY DISCHARGE DIAGNOSES  Diagnosis: Submandibular swelling  Assessment and Plan of Treatment: As above.    Diagnosis: HTN (hypertension), benign  Assessment and Plan of Treatment: Continue blood pressure medication regimen as directed. Monitor for any visual changes, headaches or dizziness.  Monitor blood pressure regularly.  Follow up with your PCP for further management for high blood pressure.    Diagnosis: Type 2 diabetes mellitus without complication, without long-term current use of insulin  Assessment and Plan of Treatment: Continue your medication regimen and a consistent carbohydrate diet (Meaning eating the same amount of carbohydrates at the same time each day). Monitor blood glucose levels throughout the day before meals and at bedtime. Record blood sugars and bring to outpatient providers appointment in order to be reviewed by your doctor for management modifications. Monitor for signs/symptoms of low blood glucose, such as, dizziness, altered mental status, or cool/clammy skin. In addition, monitor for signs/symptoms of high blood glucose, such as, feeling hot, dry, fatigued, or with increased thirst/urination. Make regular podiatry appointments in order to have feet checked for wounds and uncontrolled toe nail growth to prevent infections, as well as, appointments with an ophthalmologist to monitor your vision.   PLEASE FOLLOW UP WITH:  [facility]  Dr. Carrasquillo 12/3 11:15AM   [address] [address]  Migel Herndon Diabetes educator 10/28 11AM   [address] [address]  [phone].      Diagnosis: Other hyperlipidemia  Assessment and Plan of Treatment: Continue cholesterol control medications. Continue DASH diet. Follow up with your PCP within 1 week of discharge for further management and monitoring of lipid and cholesterol panels. PRINCIPAL DISCHARGE DIAGNOSIS  Diagnosis: Peritonsillar abscess  Assessment and Plan of Treatment: Continue with antibiotics as directed. Continue with adequate oral hydration and oral lemon drops to prevent excessive drying of mouth. Continue with warm compresses to R jaw every 4 hours. Continue with salt water rinses after every meal. Please ensure strict glucose control as elevated glucose levels can cause worsening infection.   **Please follow up with the Infectious Disease and ENT teams in their clinics within 1 - 2 weeks of discharge.  **Please continue taking your Doxycycline and Augmentin until 10/11  **Please ensure strict caution when spending time in direct sun. It is advised that you use sunscreen and or wear longsleeves/pants, and wear a hat when outside for extended periods of time.        SECONDARY DISCHARGE DIAGNOSES  Diagnosis: Type 2 diabetes mellitus without complication, without long-term current use of insulin  Assessment and Plan of Treatment: Continue your medication regimen and a consistent carbohydrate diet (Meaning eating the same amount of carbohydrates at the same time each day). Monitor blood glucose levels throughout the day before meals and at bedtime. Record blood sugars and bring to outpatient providers appointment in order to be reviewed by your doctor for management modifications. Monitor for signs/symptoms of low blood glucose, such as, dizziness, altered mental status, or cool/clammy skin. In addition, monitor for signs/symptoms of high blood glucose, such as, feeling hot, dry, fatigued, or with increased thirst/urination. Make regular podiatry appointments in order to have feet checked for wounds and uncontrolled toe nail growth to prevent infections, as well as, appointments with an ophthalmologist to monitor your vision.   PLEASE FOLLOW UP WITH:  [facility]  Dr. Carrasquillo 12/3 11:15AM   [address] [address]  Migel Herndon Diabetes educator 10/28 11AM   [address] [address]  [phone].      Diagnosis: Other hyperlipidemia  Assessment and Plan of Treatment: Continue cholesterol control medications. Continue DASH diet. Follow up with your PCP within 1 week of discharge for further management and monitoring of lipid and cholesterol panels.    Diagnosis: HTN (hypertension), benign  Assessment and Plan of Treatment: Continue blood pressure medication regimen as directed. Monitor for any visual changes, headaches or dizziness.  Monitor blood pressure regularly.  Follow up with your PCP for further management for high blood pressure.    Diagnosis: Submandibular swelling  Assessment and Plan of Treatment: As above.

## 2019-09-29 NOTE — DISCHARGE NOTE PROVIDER - NSDCFUSCHEDAPPT_GEN_ALL_CORE_FT
VISH MORELOS ; 10/28/2019 ; NPP Endocrin 560 [facility]  VISH MORELOS ; 12/03/2019 ; NP Med Endocr 865 [facility] VISH MORELOS ; 10/28/2019 ; NPP Endocrin 560 [facility]  VISH MORELOS ; 12/03/2019 ; NP Med Endocr 865 [facility] VISH MORELOS ; 10/28/2019 ; NPP Endocrin 560 [facility]  VISH MORELOS ; 12/03/2019 ; NP Med Endocr 865 [facility] VISH MORELOS ; 10/28/2019 ; NPP Endocrin 560 [facility]  VISH MORELOS ; 12/03/2019 ; NP Med Endocr 865 [facility] VISH MORELOS ; 10/28/2019 ; NPP Endocrin 560 [facility]  VISH MORELOS ; 12/03/2019 ; NP Med Endocr 865 [facility] VISH MORELOS ; 10/28/2019 ; NPP Endocrin 560 [facility]  VISH MORELOS ; 12/03/2019 ; NP Med Endocr 865 [facility] VISH MORELOS ; 10/28/2019 ; NPP Endocrin 560 [facility]  VISH MORELOS ; 12/03/2019 ; NP Med Endocr 865 [facility]

## 2019-09-29 NOTE — PROGRESS NOTE ADULT - PROBLEM SELECTOR PLAN 1
Hgb a1c of 11.6 uncontrolled on metformin 500mg BID.   - increased lantus to 24 units qhs   - increased humalog to 8 units TIDAC   - continue moderate Humalog correctional scale before meals and bed  - F/U RD consult    - Provider to RN insulin pen teach (order in)     Discharge  Basal/bolus insulin, doses TBD, alone with Metformin   [facility]  Dr. Carrasquillo 12/3 11:15AM   [address] [address]  Migel Herndon Diabetes educator 10/28 11AM   [address] [id number] [phone]. Hgb a1c of 11.6 uncontrolled on metformin 500mg BID as outpatient   Hyperglycemia   Target glucose 100-180  - increased lantus to 32 units qhs   - increased humalog to 12 units TIDAC   - continue moderate Humalog correctional scale before meals and bed  - F/U RD consult    - Provider to RN insulin pen teach (order in)     Discharge  Basal/bolus insulin, doses TBD, alone with Metformin if no contraindication   [facility]  Dr. Carrasquillo 12/3 11:15AM   [address] [address]  Migel Herndon Diabetes educator 10/28 11AM   [address] [id number] [phone].

## 2019-09-29 NOTE — PROGRESS NOTE ADULT - SUBJECTIVE AND OBJECTIVE BOX
Patient seen and examined at the bedside Feels pain is improving. WBC downtrending. Repeat CT yesterday showing intersitial fluid with decrease in PTA size. No drainable collection.    ICU Vital Signs Last 24 Hrs  T(C): 36.8 (29 Sep 2019 05:49), Max: 37.3 (28 Sep 2019 10:00)  T(F): 98.3 (29 Sep 2019 05:49), Max: 99.1 (28 Sep 2019 10:00)  HR: 82 (29 Sep 2019 06:49) (72 - 104)  BP: 124/86 (29 Sep 2019 06:49) (109/59 - 148/92)  BP(mean): --  ABP: --  ABP(mean): --  RR: 17 (29 Sep 2019 05:49) (17 - 20)  SpO2: 97% (29 Sep 2019 05:49) (97% - 100%)      NAD, awake and alert  No respiratory distress, stridor, or stertor  Voice quality: normal  Face:  Symmetric without masses or lesions  OU: PERRL, EOMI  Nose: nasal cavity clear bilaterally, inferior turbinates normal, mucosa normal without crusting or bleeding  OC/OP: mucosa dry, tongue midline, floor of mouth soft, no masses or lesions, soft palate with decreased right bulging, no fluctuance, moderate TTP, bilateral tonsils 2+ with small exudate and white debris. Purulent material expressed from previous PTA drainage site.  Neck: Right submandibular gland induration, fullness, right submental fullness, ttp, no overlying skin changes, no purulence from stensen's or merry's duct bilaterally, R LAD, full neck ROM  CNII-XII intact    A/P:  54M with hx of DM with R PTA s/p I&D and submandibular gland swelling, possible sialadenitis vs. reactive from PTA.     - Continue vanc/zosyn per ID, consider changing per ID give persistent pain?  - Massages of R SMG  - Aggressive PO vs IV hydration  - Warm compresses to R SMG   - Sialogogues   - Strict glucose control  - Broaden to vanc/zosyn    - Warm salt water rinses TID after meals  - Diet as tolerated  - will follow for improvement of symptoms

## 2019-09-29 NOTE — DISCHARGE NOTE PROVIDER - CARE PROVIDERS DIRECT ADDRESSES
,DirectAddress_Unknown,DirectAddress_Unknown,[email or web address]

## 2019-09-29 NOTE — PROGRESS NOTE ADULT - SUBJECTIVE AND OBJECTIVE BOX
Patient is a 54y old  Male who presents with a chief complaint of dysphagia (29 Sep 2019 15:44)      INTERVAL HPI/OVERNIGHT EVENTS:  T(C): 36.7 (09-29-19 @ 21:55), Max: 36.9 (09-29-19 @ 13:25)  HR: 86 (09-29-19 @ 21:55) (72 - 88)  BP: 137/91 (09-29-19 @ 21:55) (109/59 - 143/76)  RR: 18 (09-29-19 @ 21:55) (17 - 18)  SpO2: 97% (09-29-19 @ 21:55) (97% - 99%)  Wt(kg): --  I&O's Summary    28 Sep 2019 07:01  -  29 Sep 2019 07:00  --------------------------------------------------------  IN: 1050 mL / OUT: 0 mL / NET: 1050 mL        PAST MEDICAL & SURGICAL HISTORY:  Type 2 diabetes mellitus  No significant past surgical history      SOCIAL HISTORY  Alcohol:  Tobacco:  Illicit substance use:    FAMILY HISTORY:    REVIEW OF SYSTEMS:  CONSTITUTIONAL: No fever, weight loss, or fatigue  EYES: No eye pain, visual disturbances, or discharge  ENMT:  No difficulty hearing, tinnitus, vertigo; No sinus or throat pain  NECK: No pain or stiffness  RESPIRATORY: No cough, wheezing, chills or hemoptysis; No shortness of breath  CARDIOVASCULAR: No chest pain, palpitations, dizziness, or leg swelling  GASTROINTESTINAL: No abdominal or epigastric pain. No nausea, vomiting, or hematemesis; No diarrhea or constipation. No melena or hematochezia.  GENITOURINARY: No dysuria, frequency, hematuria, or incontinence  NEUROLOGICAL: No headaches, memory loss, loss of strength, numbness, or tremors  SKIN: No itching, burning, rashes, or lesions   LYMPH NODES: No enlarged glands  ENDOCRINE: No heat or cold intolerance; No hair loss  MUSCULOSKELETAL: No joint pain or swelling; No muscle, back, or extremity pain  PSYCHIATRIC: No depression, anxiety, mood swings, or difficulty sleeping  HEME/LYMPH: No easy bruising, or bleeding gums  ALLERY AND IMMUNOLOGIC: No hives or eczema    RADIOLOGY & ADDITIONAL TESTS:    Imaging Personally Reviewed:  [ ] YES  [ ] NO    Consultant(s) Notes Reviewed:  [ ] YES  [ ] NO    PHYSICAL EXAM:  GENERAL: NAD, well-groomed, well-developed  HEAD:  Atraumatic, Normocephalic  EYES: EOMI, PERRLA, conjunctiva and sclera clear  ENMT: No tonsillar erythema, exudates, or enlargement; Moist mucous membranes, Good dentition, No lesions  NECK: Supple, No JVD, Normal thyroid  NERVOUS SYSTEM:  Alert & Oriented X3, Good concentration; Motor Strength 5/5 B/L upper and lower extremities; DTRs 2+ intact and symmetric  CHEST/LUNG: Clear to percussion bilaterally; No rales, rhonchi, wheezing, or rubs  HEART: Regular rate and rhythm; No murmurs, rubs, or gallops  ABDOMEN: Soft, Nontender, Nondistended; Bowel sounds present  EXTREMITIES:  2+ Peripheral Pulses, No clubbing, cyanosis, or edema  LYMPH: No lymphadenopathy noted  SKIN: No rashes or lesions    LABS:                        12.6   13.11 )-----------( 228      ( 29 Sep 2019 05:35 )             37.5     09-29    135  |  102  |  16  ----------------------------<  306<H>  4.4   |  23  |  0.72    Ca    8.7      29 Sep 2019 05:35  Phos  3.2     09-29  Mg     1.6     09-29          CAPILLARY BLOOD GLUCOSE      POCT Blood Glucose.: 250 mg/dL (29 Sep 2019 21:51)  POCT Blood Glucose.: 238 mg/dL (29 Sep 2019 16:32)  POCT Blood Glucose.: 234 mg/dL (29 Sep 2019 11:57)  POCT Blood Glucose.: 319 mg/dL (29 Sep 2019 08:27)            MEDICATIONS  (STANDING):  acetaminophen  IVPB .. 1000 milliGRAM(s) IV Intermittent once  dextrose 5%. 1000 milliLiter(s) (50 mL/Hr) IV Continuous <Continuous>  dextrose 50% Injectable 12.5 Gram(s) IV Push once  dextrose 50% Injectable 25 Gram(s) IV Push once  dextrose 50% Injectable 25 Gram(s) IV Push once  ibuprofen  Tablet. 600 milliGRAM(s) Oral once  influenza   Vaccine 0.5 milliLiter(s) IntraMuscular once  insulin glargine Injectable (LANTUS) 32 Unit(s) SubCutaneous at bedtime  insulin lispro (HumaLOG) corrective regimen sliding scale   SubCutaneous three times a day before meals  insulin lispro (HumaLOG) corrective regimen sliding scale   SubCutaneous at bedtime  insulin lispro Injectable (HumaLOG) 12 Unit(s) SubCutaneous three times a day before meals  piperacillin/tazobactam IVPB.. 3.375 Gram(s) IV Intermittent every 8 hours  sodium chloride 0.9%. 1000 milliLiter(s) (50 mL/Hr) IV Continuous <Continuous>  vancomycin  IVPB 1250 milliGRAM(s) IV Intermittent every 12 hours  vancomycin  IVPB        MEDICATIONS  (PRN):  acetaminophen   Tablet .. 650 milliGRAM(s) Oral every 6 hours PRN Mild Pain (1 - 3)  dextrose 40% Gel 15 Gram(s) Oral once PRN Blood Glucose LESS THAN 70 milliGRAM(s)/deciliter  glucagon  Injectable 1 milliGRAM(s) IntraMuscular once PRN Glucose LESS THAN 70 milligrams/deciliter  morphine  - Injectable 4 milliGRAM(s) IV Push every 4 hours PRN moderate to severe pain (4-10)      Care Discussed with Consultants/Other Providers [ ] YES  [ ] NO Patient is a 54y old  Male who presents with a chief complaint of dysphagia (29 Sep 2019 15:44)    pt. seen and examined, still swelling rt. submandiblar region , pain better  INTERVAL HPI/OVERNIGHT EVENTS:  T(C): 36.7 (09-29-19 @ 21:55), Max: 36.9 (09-29-19 @ 13:25)  HR: 86 (09-29-19 @ 21:55) (72 - 88)  BP: 137/91 (09-29-19 @ 21:55) (109/59 - 143/76)  RR: 18 (09-29-19 @ 21:55) (17 - 18)  SpO2: 97% (09-29-19 @ 21:55) (97% - 99%)  Wt(kg): --  I&O's Summary    28 Sep 2019 07:01  -  29 Sep 2019 07:00  --------------------------------------------------------  IN: 1050 mL / OUT: 0 mL / NET: 1050 mL        PAST MEDICAL & SURGICAL HISTORY:  Type 2 diabetes mellitus  No significant past surgical history      SOCIAL HISTORY  Alcohol:  Tobacco:  Illicit substance use:    FAMILY HISTORY:    REVIEW OF SYSTEMS:  CONSTITUTIONAL: No fever, weight loss, or fatigue  EYES: No eye pain, visual disturbances, or discharge  ENMT:  No difficulty hearing, tinnitus, vertigo; No sinus or throat pain  NECK: No pain or stiffness  RESPIRATORY: No cough, wheezing, chills or hemoptysis; No shortness of breath  CARDIOVASCULAR: No chest pain, palpitations, dizziness, or leg swelling  GASTROINTESTINAL: No abdominal or epigastric pain. No nausea, vomiting, or hematemesis; No diarrhea or constipation. No melena or hematochezia.  GENITOURINARY: No dysuria, frequency, hematuria, or incontinence  NEUROLOGICAL: No headaches, memory loss, loss of strength, numbness, or tremors  SKIN: No itching, burning, rashes, or lesions   LYMPH NODES: No enlarged glands  ENDOCRINE: No heat or cold intolerance; No hair loss  MUSCULOSKELETAL: No joint pain or swelling; No muscle, back, or extremity pain  PSYCHIATRIC: No depression, anxiety, mood swings, or difficulty sleeping  HEME/LYMPH: No easy bruising, or bleeding gums  ALLERY AND IMMUNOLOGIC: No hives or eczema    RADIOLOGY & ADDITIONAL TESTS:    Imaging Personally Reviewed:  [ ] YES  [ ] NO    Consultant(s) Notes Reviewed:  [ ] YES  [ ] NO    PHYSICAL EXAM:  GENERAL: NAD, well-groomed, well-developed  HEAD:  Atraumatic, Normocephalic  EYES: EOMI, PERRLA, conjunctiva and sclera clear  ENMT: No tonsillar erythema, exudates, or enlargement; Moist mucous membranes, Good dentition, No lesions  NECK: Supple, No JVD, Normal thyroid  NERVOUS SYSTEM:  Alert & Oriented X3, Good concentration; Motor Strength 5/5 B/L upper and lower extremities; DTRs 2+ intact and symmetric  CHEST/LUNG: Clear to percussion bilaterally; No rales, rhonchi, wheezing, or rubs  HEART: Regular rate and rhythm; No murmurs, rubs, or gallops  ABDOMEN: Soft, Nontender, Nondistended; Bowel sounds present  EXTREMITIES:  2+ Peripheral Pulses, No clubbing, cyanosis, or edema  LYMPH: No lymphadenopathy noted  SKIN: No rashes or lesions    LABS:                        12.6   13.11 )-----------( 228      ( 29 Sep 2019 05:35 )             37.5     09-29    135  |  102  |  16  ----------------------------<  306<H>  4.4   |  23  |  0.72    Ca    8.7      29 Sep 2019 05:35  Phos  3.2     09-29  Mg     1.6     09-29          CAPILLARY BLOOD GLUCOSE      POCT Blood Glucose.: 250 mg/dL (29 Sep 2019 21:51)  POCT Blood Glucose.: 238 mg/dL (29 Sep 2019 16:32)  POCT Blood Glucose.: 234 mg/dL (29 Sep 2019 11:57)  POCT Blood Glucose.: 319 mg/dL (29 Sep 2019 08:27)            MEDICATIONS  (STANDING):  acetaminophen  IVPB .. 1000 milliGRAM(s) IV Intermittent once  dextrose 5%. 1000 milliLiter(s) (50 mL/Hr) IV Continuous <Continuous>  dextrose 50% Injectable 12.5 Gram(s) IV Push once  dextrose 50% Injectable 25 Gram(s) IV Push once  dextrose 50% Injectable 25 Gram(s) IV Push once  ibuprofen  Tablet. 600 milliGRAM(s) Oral once  influenza   Vaccine 0.5 milliLiter(s) IntraMuscular once  insulin glargine Injectable (LANTUS) 32 Unit(s) SubCutaneous at bedtime  insulin lispro (HumaLOG) corrective regimen sliding scale   SubCutaneous three times a day before meals  insulin lispro (HumaLOG) corrective regimen sliding scale   SubCutaneous at bedtime  insulin lispro Injectable (HumaLOG) 12 Unit(s) SubCutaneous three times a day before meals  piperacillin/tazobactam IVPB.. 3.375 Gram(s) IV Intermittent every 8 hours  sodium chloride 0.9%. 1000 milliLiter(s) (50 mL/Hr) IV Continuous <Continuous>  vancomycin  IVPB 1250 milliGRAM(s) IV Intermittent every 12 hours  vancomycin  IVPB        MEDICATIONS  (PRN):  acetaminophen   Tablet .. 650 milliGRAM(s) Oral every 6 hours PRN Mild Pain (1 - 3)  dextrose 40% Gel 15 Gram(s) Oral once PRN Blood Glucose LESS THAN 70 milliGRAM(s)/deciliter  glucagon  Injectable 1 milliGRAM(s) IntraMuscular once PRN Glucose LESS THAN 70 milligrams/deciliter  morphine  - Injectable 4 milliGRAM(s) IV Push every 4 hours PRN moderate to severe pain (4-10)      Care Discussed with Consultants/Other Providers [ ] YES  [ ] NO

## 2019-09-30 LAB
ANION GAP SERPL CALC-SCNC: 10 MMO/L — SIGNIFICANT CHANGE UP (ref 7–14)
BUN SERPL-MCNC: 16 MG/DL — SIGNIFICANT CHANGE UP (ref 7–23)
CALCIUM SERPL-MCNC: 8.7 MG/DL — SIGNIFICANT CHANGE UP (ref 8.4–10.5)
CHLORIDE SERPL-SCNC: 103 MMOL/L — SIGNIFICANT CHANGE UP (ref 98–107)
CO2 SERPL-SCNC: 23 MMOL/L — SIGNIFICANT CHANGE UP (ref 22–31)
CREAT SERPL-MCNC: 0.71 MG/DL — SIGNIFICANT CHANGE UP (ref 0.5–1.3)
GLUCOSE SERPL-MCNC: 293 MG/DL — HIGH (ref 70–99)
HCT VFR BLD CALC: 37.1 % — LOW (ref 39–50)
HGB BLD-MCNC: 12.4 G/DL — LOW (ref 13–17)
MAGNESIUM SERPL-MCNC: 1.6 MG/DL — SIGNIFICANT CHANGE UP (ref 1.6–2.6)
MCHC RBC-ENTMCNC: 25.2 PG — LOW (ref 27–34)
MCHC RBC-ENTMCNC: 33.4 % — SIGNIFICANT CHANGE UP (ref 32–36)
MCV RBC AUTO: 75.4 FL — LOW (ref 80–100)
NRBC # FLD: 0 K/UL — SIGNIFICANT CHANGE UP (ref 0–0)
PHOSPHATE SERPL-MCNC: 3.2 MG/DL — SIGNIFICANT CHANGE UP (ref 2.5–4.5)
PLATELET # BLD AUTO: 251 K/UL — SIGNIFICANT CHANGE UP (ref 150–400)
PMV BLD: 10.5 FL — SIGNIFICANT CHANGE UP (ref 7–13)
POTASSIUM SERPL-MCNC: 4.5 MMOL/L — SIGNIFICANT CHANGE UP (ref 3.5–5.3)
POTASSIUM SERPL-SCNC: 4.5 MMOL/L — SIGNIFICANT CHANGE UP (ref 3.5–5.3)
RBC # BLD: 4.92 M/UL — SIGNIFICANT CHANGE UP (ref 4.2–5.8)
RBC # FLD: 14.9 % — HIGH (ref 10.3–14.5)
SODIUM SERPL-SCNC: 136 MMOL/L — SIGNIFICANT CHANGE UP (ref 135–145)
VANCOMYCIN TROUGH SERPL-MCNC: 6.6 UG/ML — LOW (ref 10–20)
WBC # BLD: 12.51 K/UL — HIGH (ref 3.8–10.5)
WBC # FLD AUTO: 12.51 K/UL — HIGH (ref 3.8–10.5)

## 2019-09-30 PROCEDURE — 99232 SBSQ HOSP IP/OBS MODERATE 35: CPT

## 2019-09-30 RX ORDER — INSULIN LISPRO 100/ML
15 VIAL (ML) SUBCUTANEOUS
Qty: 3 | Refills: 0
Start: 2019-09-30 | End: 2019-10-29

## 2019-09-30 RX ORDER — ENOXAPARIN SODIUM 100 MG/ML
40 INJECTION SUBCUTANEOUS
Qty: 3 | Refills: 0
Start: 2019-09-30 | End: 2019-10-29

## 2019-09-30 RX ORDER — ISOPROPYL ALCOHOL, BENZOCAINE .7; .06 ML/ML; ML/ML
1 SWAB TOPICAL
Qty: 100 | Refills: 1
Start: 2019-09-30 | End: 2019-11-18

## 2019-09-30 RX ORDER — ACETAMINOPHEN 500 MG
1000 TABLET ORAL ONCE
Refills: 0 | Status: COMPLETED | OUTPATIENT
Start: 2019-09-30 | End: 2019-09-30

## 2019-09-30 RX ORDER — GLYBURIDE-METFORMIN HYDROCHLORIDE 1.25; 25 MG/1; MG/1
2 TABLET ORAL
Qty: 0 | Refills: 0 | DISCHARGE

## 2019-09-30 RX ORDER — KETOROLAC TROMETHAMINE 30 MG/ML
15 SYRINGE (ML) INJECTION ONCE
Refills: 0 | Status: DISCONTINUED | OUTPATIENT
Start: 2019-09-30 | End: 2019-09-30

## 2019-09-30 RX ORDER — INSULIN LISPRO 100/ML
15 VIAL (ML) SUBCUTANEOUS
Qty: 6 | Refills: 0
Start: 2019-09-30 | End: 2019-10-29

## 2019-09-30 RX ORDER — VANCOMYCIN HCL 1 G
1500 VIAL (EA) INTRAVENOUS EVERY 12 HOURS
Refills: 0 | Status: DISCONTINUED | OUTPATIENT
Start: 2019-09-30 | End: 2019-09-30

## 2019-09-30 RX ORDER — INSULIN GLARGINE 100 [IU]/ML
40 INJECTION, SOLUTION SUBCUTANEOUS
Qty: 3 | Refills: 0
Start: 2019-09-30 | End: 2019-10-29

## 2019-09-30 RX ORDER — INSULIN LISPRO 100/ML
15 VIAL (ML) SUBCUTANEOUS
Refills: 0 | Status: DISCONTINUED | OUTPATIENT
Start: 2019-09-30 | End: 2019-10-01

## 2019-09-30 RX ORDER — VANCOMYCIN HCL 1 G
1000 VIAL (EA) INTRAVENOUS EVERY 8 HOURS
Refills: 0 | Status: DISCONTINUED | OUTPATIENT
Start: 2019-09-30 | End: 2019-10-01

## 2019-09-30 RX ORDER — INSULIN GLARGINE 100 [IU]/ML
40 INJECTION, SOLUTION SUBCUTANEOUS AT BEDTIME
Refills: 0 | Status: DISCONTINUED | OUTPATIENT
Start: 2019-09-30 | End: 2019-10-01

## 2019-09-30 RX ADMIN — Medication 4: at 17:36

## 2019-09-30 RX ADMIN — Medication 250 MILLIGRAM(S): at 14:40

## 2019-09-30 RX ADMIN — PIPERACILLIN AND TAZOBACTAM 25 GRAM(S): 4; .5 INJECTION, POWDER, LYOPHILIZED, FOR SOLUTION INTRAVENOUS at 19:37

## 2019-09-30 RX ADMIN — INSULIN GLARGINE 40 UNIT(S): 100 INJECTION, SOLUTION SUBCUTANEOUS at 22:28

## 2019-09-30 RX ADMIN — Medication 15 MILLIGRAM(S): at 18:30

## 2019-09-30 RX ADMIN — Medication 600 MILLIGRAM(S): at 00:00

## 2019-09-30 RX ADMIN — Medication 15 MILLIGRAM(S): at 18:50

## 2019-09-30 RX ADMIN — Medication 1000 MILLIGRAM(S): at 15:15

## 2019-09-30 RX ADMIN — Medication 650 MILLIGRAM(S): at 08:56

## 2019-09-30 RX ADMIN — Medication 15 UNIT(S): at 17:36

## 2019-09-30 RX ADMIN — Medication 12 UNIT(S): at 08:49

## 2019-09-30 RX ADMIN — SODIUM CHLORIDE 50 MILLILITER(S): 9 INJECTION INTRAMUSCULAR; INTRAVENOUS; SUBCUTANEOUS at 05:26

## 2019-09-30 RX ADMIN — Medication 1000 MILLIGRAM(S): at 00:00

## 2019-09-30 RX ADMIN — Medication 650 MILLIGRAM(S): at 09:31

## 2019-09-30 RX ADMIN — Medication 166.67 MILLIGRAM(S): at 07:35

## 2019-09-30 RX ADMIN — PIPERACILLIN AND TAZOBACTAM 25 GRAM(S): 4; .5 INJECTION, POWDER, LYOPHILIZED, FOR SOLUTION INTRAVENOUS at 04:00

## 2019-09-30 RX ADMIN — SODIUM CHLORIDE 50 MILLILITER(S): 9 INJECTION INTRAMUSCULAR; INTRAVENOUS; SUBCUTANEOUS at 21:21

## 2019-09-30 RX ADMIN — Medication 400 MILLIGRAM(S): at 14:40

## 2019-09-30 RX ADMIN — Medication 4: at 12:49

## 2019-09-30 RX ADMIN — Medication 8: at 08:49

## 2019-09-30 RX ADMIN — Medication 12 UNIT(S): at 12:49

## 2019-09-30 RX ADMIN — PIPERACILLIN AND TAZOBACTAM 25 GRAM(S): 4; .5 INJECTION, POWDER, LYOPHILIZED, FOR SOLUTION INTRAVENOUS at 12:49

## 2019-09-30 NOTE — PROGRESS NOTE ADULT - SUBJECTIVE AND OBJECTIVE BOX
Patient seen and examined at the bedside Feels pain is similar. WBC downtrending though still elevated. Tolerating PO.    Vital Signs Last 24 Hrs  T(C): 36.8 (30 Sep 2019 06:06), Max: 36.9 (29 Sep 2019 13:25)  T(F): 98.3 (30 Sep 2019 06:06), Max: 98.5 (29 Sep 2019 13:25)  HR: 78 (30 Sep 2019 06:06) (78 - 88)  BP: 132/90 (30 Sep 2019 06:06) (124/86 - 143/76)  BP(mean): --  RR: 17 (30 Sep 2019 06:06) (17 - 18)  SpO2: 100% (30 Sep 2019 06:06) (97% - 100%)    NAD, awake and alert  No respiratory distress, stridor, or stertor  Voice quality: normal  Face:  Symmetric without masses or lesions  OU: PERRL, EOMI  Nose: nasal cavity clear bilaterally, inferior turbinates normal, mucosa normal without crusting or bleeding  OC/OP: mucosa dry, tongue midline, floor of mouth soft, no masses or lesions, soft palate with decreased right bulging, no fluctuance, moderate TTP, bilateral tonsils 2+ with small exudate and white debris. Purulent material expressed from previous PTA drainage site.  Neck: Right submandibular gland induration, fullness, right submental fullness, ttp, no overlying skin changes, no purulence from stensen's or merry's duct bilaterally, R LAD, full neck ROM  CNII-XII intact    A/P:  54M with hx of DM with R PTA s/p I&D and submandibular gland swelling, possible sialadenitis vs. reactive from PTA.     - Continue vanc/zosyn per ID. Vanc trough sub-therapeutic x2. Would increase dose/frequency and touch base with ID  - Continue aggressive massages of R SMG and PO vs IV hydration  - Warm compresses to R SMG   - Sialogogues   - Strict glucose control  - Warm salt water rinses TID after meals  - Diet as tolerated  - will follow for improvement of symptoms  - d/w attending

## 2019-09-30 NOTE — PROGRESS NOTE ADULT - PROBLEM SELECTOR PLAN 1
Hgb a1c of 11.6 uncontrolled on metformin 500mg BID as outpatient   Hyperglycemia   Target glucose 100-180  - increased lantus to 32 units qhs   - increased humalog to 12 units TIDAC   - continue moderate Humalog correctional scale before meals and bed  - F/U RD consult    - Provider to RN insulin pen teach (order in)     Discharge  Basal/bolus insulin, doses TBD, alone with Metformin if no contraindication   [facility]  Dr. Carrasquillo 12/3 11:15AM   [address] [address]  Migel Herndon Diabetes educator 10/28 11AM   [address] [id number] [phone]. Hgb a1c of 11.6 uncontrolled on metformin 500mg BID as outpatient   Hyperglycemia   Target glucose 100-180  - increased lantus to 40 units qhs   - increased humalog to 15 units TIDAC   - continue moderate Humalog correctional scale before meals and bedtime  - Provider to RN insulin pen teaching    Discharge  Basal/bolus insulin (please contact endocrine for final recs prior to discharge)  patient had Lactic acidosis on arrival (likely due to infection rather than metformin)  To simplify regimen, discharge on basal/bolus only   Metformin can be restarted as outpatient at later time once DM is better controlled.  [facility]  Dr. Carrasquillo 12/3 11:15AM   [address] [address], [place] [address]  Migel Herndon Diabetes educator 10/28 11AM   [address] [address], [place], [place] [phone].

## 2019-09-30 NOTE — PROGRESS NOTE ADULT - SUBJECTIVE AND OBJECTIVE BOX
CC: Patient is a 54y old  Male who presents with a chief complaint of dysphagia (30 Sep 2019 06:43)    ID following for right facial abscess, swelling, pain    Interval History/ROS: Patient states still remains with right facial swelling and pain, worse with opening his mouth and eating. No dysphagia, tolerating breakfast this morning. No fevers, no chills.     Rest of ROS negative.    Allergies  No Known Allergies    ANTIMICROBIALS:  piperacillin/tazobactam IVPB.. 3.375 every 8 hours  vancomycin  IVPB 1500 every 12 hours    OTHER MEDS:  acetaminophen   Tablet .. 650 milliGRAM(s) Oral every 6 hours PRN  dextrose 40% Gel 15 Gram(s) Oral once PRN  dextrose 5%. 1000 milliLiter(s) IV Continuous <Continuous>  dextrose 50% Injectable 12.5 Gram(s) IV Push once  dextrose 50% Injectable 25 Gram(s) IV Push once  dextrose 50% Injectable 25 Gram(s) IV Push once  glucagon  Injectable 1 milliGRAM(s) IntraMuscular once PRN  influenza   Vaccine 0.5 milliLiter(s) IntraMuscular once  insulin glargine Injectable (LANTUS) 32 Unit(s) SubCutaneous at bedtime  insulin lispro (HumaLOG) corrective regimen sliding scale   SubCutaneous three times a day before meals  insulin lispro (HumaLOG) corrective regimen sliding scale   SubCutaneous at bedtime  insulin lispro Injectable (HumaLOG) 12 Unit(s) SubCutaneous three times a day before meals  morphine  - Injectable 4 milliGRAM(s) IV Push every 4 hours PRN  sodium chloride 0.9%. 1000 milliLiter(s) IV Continuous <Continuous>    PE:    Vital Signs Last 24 Hrs  T(C): 36.4 (30 Sep 2019 10:05), Max: 36.9 (29 Sep 2019 13:25)  T(F): 97.6 (30 Sep 2019 10:05), Max: 98.5 (29 Sep 2019 13:25)  HR: 92 (30 Sep 2019 10:05) (78 - 92)  BP: 141/98 (30 Sep 2019 10:05) (132/90 - 143/76)  BP(mean): --  RR: 17 (30 Sep 2019 10:05) (17 - 18)  SpO2: 100% (30 Sep 2019 10:05) (97% - 100%)    Gen: AOx3, NAD, non-toxic  HEENT: right facial swelling and tenderness, no erythema, no oral discharge  CV: S1+S2 normal, no murmurs  Resp: Clear bilat, no resp distress  Abd: Soft, nontender, +BS  Ext: No LE edema, no wounds  : No Walker  IV/Skin: No thrombophlebitis  Neuro: no focal deficits    LABS:                          12.4   12.51 )-----------( 251      ( 30 Sep 2019 05:28 )             37.1       09-30    136  |  103  |  16  ----------------------------<  293<H>  4.5   |  23  |  0.71    Ca    8.7      30 Sep 2019 05:28  Phos  3.2     09-30  Mg     1.6     09-30    MICROBIOLOGY:  Vancomycin Level, Trough: 6.6 ug/mL (09-30-19 @ 05:28)  v  BLOOD PERIPHERAL  09-24-19 --  --  --      OTHER  09-23-19 --  --  --      .Blood  09-23-19   No growth at 5 days.  --  --    RADIOLOGY:    < from: CT Maxillofacial w/ IV Cont (09.27.19 @ 17:01) >  Impression:    Worsening soft tissue inflammatory changes with a with decreased size of   right peritonsillar abscess.. Increasing fluid extending into the    and submandibular spaces with involvement of the right   pharyngeal wall and posterior nasal pharynx.      < end of copied text >

## 2019-09-30 NOTE — PROGRESS NOTE ADULT - ASSESSMENT
54M with poorly controlled diabetes mellitus here with R peritonsillar abscess and facial/neck cellulitis s/p drainage by ENT and   Cultures +Streptococcus anginosus +Granulicatella  +Haemophilus parainfluenzae.   repeat CT with decreased size of the abscess but increased soft tissue inflammatory changes, the  fluid extending to , submandibular spaces with involvement of the R pharyngeal wall and posterior nasal pharynx    Recommend:  -Continue zosyn 3.375 q 8  -Continue vanco - changed to 1 q 8 for now (last trough 6.6 this morning)  -Monitor vanco trough  -Improvement in size of abscess, on last CT was 1.0 x 0.7 cm, however, soft tissue inflammation is worsening and extending   -ENT following

## 2019-09-30 NOTE — PROGRESS NOTE ADULT - SUBJECTIVE AND OBJECTIVE BOX
Patient is a 54y old  Male who presents with a chief complaint of dysphagia (30 Sep 2019 12:55)      INTERVAL HPI/OVERNIGHT EVENTS:  T(C): 36.7 (09-30-19 @ 21:11), Max: 37.1 (09-30-19 @ 18:32)  HR: 76 (09-30-19 @ 21:11) (75 - 98)  BP: 121/78 (09-30-19 @ 21:11) (121/78 - 145/93)  RR: 18 (09-30-19 @ 21:11) (16 - 18)  SpO2: 94% (09-30-19 @ 21:11) (94% - 100%)  Wt(kg): --  I&O's Summary    29 Sep 2019 07:01  -  30 Sep 2019 07:00  --------------------------------------------------------  IN: 950 mL / OUT: 0 mL / NET: 950 mL    30 Sep 2019 07:01  -  30 Sep 2019 23:10  --------------------------------------------------------  IN: 800 mL / OUT: 0 mL / NET: 800 mL        PAST MEDICAL & SURGICAL HISTORY:  Type 2 diabetes mellitus  No significant past surgical history      SOCIAL HISTORY  Alcohol:  Tobacco:  Illicit substance use:    FAMILY HISTORY:    REVIEW OF SYSTEMS:  CONSTITUTIONAL: No fever, weight loss, or fatigue  EYES: No eye pain, visual disturbances, or discharge  ENMT:  No difficulty hearing, tinnitus, vertigo; No sinus or throat pain  NECK: No pain or stiffness  RESPIRATORY: No cough, wheezing, chills or hemoptysis; No shortness of breath  CARDIOVASCULAR: No chest pain, palpitations, dizziness, or leg swelling  GASTROINTESTINAL: No abdominal or epigastric pain. No nausea, vomiting, or hematemesis; No diarrhea or constipation. No melena or hematochezia.  GENITOURINARY: No dysuria, frequency, hematuria, or incontinence  NEUROLOGICAL: No headaches, memory loss, loss of strength, numbness, or tremors  SKIN: No itching, burning, rashes, or lesions   LYMPH NODES: No enlarged glands  ENDOCRINE: No heat or cold intolerance; No hair loss  MUSCULOSKELETAL: No joint pain or swelling; No muscle, back, or extremity pain  PSYCHIATRIC: No depression, anxiety, mood swings, or difficulty sleeping  HEME/LYMPH: No easy bruising, or bleeding gums  ALLERY AND IMMUNOLOGIC: No hives or eczema    RADIOLOGY & ADDITIONAL TESTS:    Imaging Personally Reviewed:  [ ] YES  [ ] NO    Consultant(s) Notes Reviewed:  [ ] YES  [ ] NO    PHYSICAL EXAM:  GENERAL: NAD, well-groomed, well-developed  HEAD:  Atraumatic, Normocephalic  EYES: EOMI, PERRLA, conjunctiva and sclera clear  ENMT: No tonsillar erythema, exudates, or enlargement; Moist mucous membranes, Good dentition, No lesions  NECK: Supple, No JVD, Normal thyroid  NERVOUS SYSTEM:  Alert & Oriented X3, Good concentration; Motor Strength 5/5 B/L upper and lower extremities; DTRs 2+ intact and symmetric  CHEST/LUNG: Clear to percussion bilaterally; No rales, rhonchi, wheezing, or rubs  HEART: Regular rate and rhythm; No murmurs, rubs, or gallops  ABDOMEN: Soft, Nontender, Nondistended; Bowel sounds present  EXTREMITIES:  2+ Peripheral Pulses, No clubbing, cyanosis, or edema  LYMPH: No lymphadenopathy noted  SKIN: No rashes or lesions    LABS:                        12.4   12.51 )-----------( 251      ( 30 Sep 2019 05:28 )             37.1     09-30    136  |  103  |  16  ----------------------------<  293<H>  4.5   |  23  |  0.71    Ca    8.7      30 Sep 2019 05:28  Phos  3.2     09-30  Mg     1.6     09-30          CAPILLARY BLOOD GLUCOSE      POCT Blood Glucose.: 192 mg/dL (30 Sep 2019 22:22)  POCT Blood Glucose.: 232 mg/dL (30 Sep 2019 17:29)  POCT Blood Glucose.: 234 mg/dL (30 Sep 2019 12:20)  POCT Blood Glucose.: 301 mg/dL (30 Sep 2019 08:19)            MEDICATIONS  (STANDING):  dextrose 5%. 1000 milliLiter(s) (50 mL/Hr) IV Continuous <Continuous>  dextrose 50% Injectable 12.5 Gram(s) IV Push once  dextrose 50% Injectable 25 Gram(s) IV Push once  dextrose 50% Injectable 25 Gram(s) IV Push once  influenza   Vaccine 0.5 milliLiter(s) IntraMuscular once  insulin glargine Injectable (LANTUS) 40 Unit(s) SubCutaneous at bedtime  insulin lispro (HumaLOG) corrective regimen sliding scale   SubCutaneous three times a day before meals  insulin lispro (HumaLOG) corrective regimen sliding scale   SubCutaneous at bedtime  insulin lispro Injectable (HumaLOG) 15 Unit(s) SubCutaneous three times a day before meals  piperacillin/tazobactam IVPB.. 3.375 Gram(s) IV Intermittent every 8 hours  sodium chloride 0.9%. 1000 milliLiter(s) (50 mL/Hr) IV Continuous <Continuous>  vancomycin  IVPB 1000 milliGRAM(s) IV Intermittent every 8 hours    MEDICATIONS  (PRN):  acetaminophen   Tablet .. 650 milliGRAM(s) Oral every 6 hours PRN Mild Pain (1 - 3)  dextrose 40% Gel 15 Gram(s) Oral once PRN Blood Glucose LESS THAN 70 milliGRAM(s)/deciliter  glucagon  Injectable 1 milliGRAM(s) IntraMuscular once PRN Glucose LESS THAN 70 milligrams/deciliter  morphine  - Injectable 4 milliGRAM(s) IV Push every 4 hours PRN moderate to severe pain (4-10)      Care Discussed with Consultants/Other Providers [ ] YES  [ ] NO Patient is a 54y old  Male who presents with a chief complaint of dysphagia (30 Sep 2019 12:55)    pt. seen and examined, doing better, still rt. submandibular swelling and tenderness   INTERVAL HPI/OVERNIGHT EVENTS:  T(C): 36.7 (09-30-19 @ 21:11), Max: 37.1 (09-30-19 @ 18:32)  HR: 76 (09-30-19 @ 21:11) (75 - 98)  BP: 121/78 (09-30-19 @ 21:11) (121/78 - 145/93)  RR: 18 (09-30-19 @ 21:11) (16 - 18)  SpO2: 94% (09-30-19 @ 21:11) (94% - 100%)  Wt(kg): --  I&O's Summary    29 Sep 2019 07:01  -  30 Sep 2019 07:00  --------------------------------------------------------  IN: 950 mL / OUT: 0 mL / NET: 950 mL    30 Sep 2019 07:01  -  30 Sep 2019 23:10  --------------------------------------------------------  IN: 800 mL / OUT: 0 mL / NET: 800 mL        PAST MEDICAL & SURGICAL HISTORY:  Type 2 diabetes mellitus  No significant past surgical history      SOCIAL HISTORY  Alcohol:  Tobacco:  Illicit substance use:    FAMILY HISTORY:    REVIEW OF SYSTEMS:  CONSTITUTIONAL: No fever, weight loss, or fatigue  EYES: No eye pain, visual disturbances, or discharge  ENMT:  No difficulty hearing, tinnitus, vertigo; No sinus or throat pain  NECK: No pain or stiffness  RESPIRATORY: No cough, wheezing, chills or hemoptysis; No shortness of breath  CARDIOVASCULAR: No chest pain, palpitations, dizziness, or leg swelling  GASTROINTESTINAL: No abdominal or epigastric pain. No nausea, vomiting, or hematemesis; No diarrhea or constipation. No melena or hematochezia.  GENITOURINARY: No dysuria, frequency, hematuria, or incontinence  NEUROLOGICAL: No headaches, memory loss, loss of strength, numbness, or tremors  SKIN: No itching, burning, rashes, or lesions   LYMPH NODES: No enlarged glands  ENDOCRINE: No heat or cold intolerance; No hair loss  MUSCULOSKELETAL: No joint pain or swelling; No muscle, back, or extremity pain  PSYCHIATRIC: No depression, anxiety, mood swings, or difficulty sleeping  HEME/LYMPH: No easy bruising, or bleeding gums  ALLERY AND IMMUNOLOGIC: No hives or eczema    RADIOLOGY & ADDITIONAL TESTS:    Imaging Personally Reviewed:  [ ] YES  [ ] NO    Consultant(s) Notes Reviewed:  [ ] YES  [ ] NO    PHYSICAL EXAM:  GENERAL: NAD, well-groomed, well-developed  HEAD:  Atraumatic, Normocephalic  EYES: EOMI, PERRLA, conjunctiva and sclera clear  ENMT: No tonsillar erythema, exudates, or enlargement; Moist mucous membranes, Good dentition, No lesions  NECK: Supple, No JVD, Normal thyroid  NERVOUS SYSTEM:  Alert & Oriented X3, Good concentration; Motor Strength 5/5 B/L upper and lower extremities; DTRs 2+ intact and symmetric  CHEST/LUNG: Clear to percussion bilaterally; No rales, rhonchi, wheezing, or rubs  HEART: Regular rate and rhythm; No murmurs, rubs, or gallops  ABDOMEN: Soft, Nontender, Nondistended; Bowel sounds present  EXTREMITIES:  2+ Peripheral Pulses, No clubbing, cyanosis, or edema  LYMPH: No lymphadenopathy noted  SKIN: No rashes or lesions    LABS:                        12.4   12.51 )-----------( 251      ( 30 Sep 2019 05:28 )             37.1     09-30    136  |  103  |  16  ----------------------------<  293<H>  4.5   |  23  |  0.71    Ca    8.7      30 Sep 2019 05:28  Phos  3.2     09-30  Mg     1.6     09-30          CAPILLARY BLOOD GLUCOSE      POCT Blood Glucose.: 192 mg/dL (30 Sep 2019 22:22)  POCT Blood Glucose.: 232 mg/dL (30 Sep 2019 17:29)  POCT Blood Glucose.: 234 mg/dL (30 Sep 2019 12:20)  POCT Blood Glucose.: 301 mg/dL (30 Sep 2019 08:19)            MEDICATIONS  (STANDING):  dextrose 5%. 1000 milliLiter(s) (50 mL/Hr) IV Continuous <Continuous>  dextrose 50% Injectable 12.5 Gram(s) IV Push once  dextrose 50% Injectable 25 Gram(s) IV Push once  dextrose 50% Injectable 25 Gram(s) IV Push once  influenza   Vaccine 0.5 milliLiter(s) IntraMuscular once  insulin glargine Injectable (LANTUS) 40 Unit(s) SubCutaneous at bedtime  insulin lispro (HumaLOG) corrective regimen sliding scale   SubCutaneous three times a day before meals  insulin lispro (HumaLOG) corrective regimen sliding scale   SubCutaneous at bedtime  insulin lispro Injectable (HumaLOG) 15 Unit(s) SubCutaneous three times a day before meals  piperacillin/tazobactam IVPB.. 3.375 Gram(s) IV Intermittent every 8 hours  sodium chloride 0.9%. 1000 milliLiter(s) (50 mL/Hr) IV Continuous <Continuous>  vancomycin  IVPB 1000 milliGRAM(s) IV Intermittent every 8 hours    MEDICATIONS  (PRN):  acetaminophen   Tablet .. 650 milliGRAM(s) Oral every 6 hours PRN Mild Pain (1 - 3)  dextrose 40% Gel 15 Gram(s) Oral once PRN Blood Glucose LESS THAN 70 milliGRAM(s)/deciliter  glucagon  Injectable 1 milliGRAM(s) IntraMuscular once PRN Glucose LESS THAN 70 milligrams/deciliter  morphine  - Injectable 4 milliGRAM(s) IV Push every 4 hours PRN moderate to severe pain (4-10)      Care Discussed with Consultants/Other Providers [ ] YES  [ ] NO

## 2019-10-01 ENCOUNTER — OUTPATIENT (OUTPATIENT)
Dept: OUTPATIENT SERVICES | Facility: HOSPITAL | Age: 54
LOS: 1 days | End: 2019-10-01
Payer: MEDICAID

## 2019-10-01 LAB
ANION GAP SERPL CALC-SCNC: 12 MMO/L — SIGNIFICANT CHANGE UP (ref 7–14)
BUN SERPL-MCNC: 13 MG/DL — SIGNIFICANT CHANGE UP (ref 7–23)
CALCIUM SERPL-MCNC: 9.2 MG/DL — SIGNIFICANT CHANGE UP (ref 8.4–10.5)
CHLORIDE SERPL-SCNC: 101 MMOL/L — SIGNIFICANT CHANGE UP (ref 98–107)
CO2 SERPL-SCNC: 23 MMOL/L — SIGNIFICANT CHANGE UP (ref 22–31)
CREAT SERPL-MCNC: 0.76 MG/DL — SIGNIFICANT CHANGE UP (ref 0.5–1.3)
GLUCOSE SERPL-MCNC: 274 MG/DL — HIGH (ref 70–99)
HCT VFR BLD CALC: 36 % — LOW (ref 39–50)
HGB BLD-MCNC: 11.9 G/DL — LOW (ref 13–17)
MAGNESIUM SERPL-MCNC: 1.6 MG/DL — SIGNIFICANT CHANGE UP (ref 1.6–2.6)
MCHC RBC-ENTMCNC: 25 PG — LOW (ref 27–34)
MCHC RBC-ENTMCNC: 33.1 % — SIGNIFICANT CHANGE UP (ref 32–36)
MCV RBC AUTO: 75.6 FL — LOW (ref 80–100)
NRBC # FLD: 0 K/UL — SIGNIFICANT CHANGE UP (ref 0–0)
PHOSPHATE SERPL-MCNC: 3.7 MG/DL — SIGNIFICANT CHANGE UP (ref 2.5–4.5)
PLATELET # BLD AUTO: 247 K/UL — SIGNIFICANT CHANGE UP (ref 150–400)
PMV BLD: 10.5 FL — SIGNIFICANT CHANGE UP (ref 7–13)
POTASSIUM SERPL-MCNC: 4.6 MMOL/L — SIGNIFICANT CHANGE UP (ref 3.5–5.3)
POTASSIUM SERPL-SCNC: 4.6 MMOL/L — SIGNIFICANT CHANGE UP (ref 3.5–5.3)
RBC # BLD: 4.76 M/UL — SIGNIFICANT CHANGE UP (ref 4.2–5.8)
RBC # FLD: 15.1 % — HIGH (ref 10.3–14.5)
SODIUM SERPL-SCNC: 136 MMOL/L — SIGNIFICANT CHANGE UP (ref 135–145)
VANCOMYCIN TROUGH SERPL-MCNC: 11.2 UG/ML — SIGNIFICANT CHANGE UP (ref 10–20)
WBC # BLD: 10.99 K/UL — HIGH (ref 3.8–10.5)
WBC # FLD AUTO: 10.99 K/UL — HIGH (ref 3.8–10.5)

## 2019-10-01 PROCEDURE — 99232 SBSQ HOSP IP/OBS MODERATE 35: CPT

## 2019-10-01 PROCEDURE — G9001: CPT

## 2019-10-01 RX ORDER — VANCOMYCIN HCL 1 G
1000 VIAL (EA) INTRAVENOUS EVERY 8 HOURS
Refills: 0 | Status: DISCONTINUED | OUTPATIENT
Start: 2019-10-01 | End: 2019-10-03

## 2019-10-01 RX ORDER — KETOROLAC TROMETHAMINE 30 MG/ML
15 SYRINGE (ML) INJECTION ONCE
Refills: 0 | Status: DISCONTINUED | OUTPATIENT
Start: 2019-10-01 | End: 2019-10-01

## 2019-10-01 RX ORDER — PIPERACILLIN AND TAZOBACTAM 4; .5 G/20ML; G/20ML
3.38 INJECTION, POWDER, LYOPHILIZED, FOR SOLUTION INTRAVENOUS EVERY 8 HOURS
Refills: 0 | Status: DISCONTINUED | OUTPATIENT
Start: 2019-10-01 | End: 2019-10-03

## 2019-10-01 RX ORDER — INSULIN GLARGINE 100 [IU]/ML
48 INJECTION, SOLUTION SUBCUTANEOUS AT BEDTIME
Refills: 0 | Status: DISCONTINUED | OUTPATIENT
Start: 2019-10-01 | End: 2019-10-03

## 2019-10-01 RX ORDER — SODIUM CHLORIDE 9 MG/ML
1000 INJECTION INTRAMUSCULAR; INTRAVENOUS; SUBCUTANEOUS
Refills: 0 | Status: DISCONTINUED | OUTPATIENT
Start: 2019-10-01 | End: 2019-10-03

## 2019-10-01 RX ORDER — INSULIN LISPRO 100/ML
16 VIAL (ML) SUBCUTANEOUS
Refills: 0 | Status: DISCONTINUED | OUTPATIENT
Start: 2019-10-01 | End: 2019-10-03

## 2019-10-01 RX ADMIN — Medication 6: at 08:43

## 2019-10-01 RX ADMIN — PIPERACILLIN AND TAZOBACTAM 25 GRAM(S): 4; .5 INJECTION, POWDER, LYOPHILIZED, FOR SOLUTION INTRAVENOUS at 03:06

## 2019-10-01 RX ADMIN — PIPERACILLIN AND TAZOBACTAM 25 GRAM(S): 4; .5 INJECTION, POWDER, LYOPHILIZED, FOR SOLUTION INTRAVENOUS at 21:02

## 2019-10-01 RX ADMIN — SODIUM CHLORIDE 75 MILLILITER(S): 9 INJECTION INTRAMUSCULAR; INTRAVENOUS; SUBCUTANEOUS at 21:02

## 2019-10-01 RX ADMIN — Medication 650 MILLIGRAM(S): at 15:08

## 2019-10-01 RX ADMIN — PIPERACILLIN AND TAZOBACTAM 25 GRAM(S): 4; .5 INJECTION, POWDER, LYOPHILIZED, FOR SOLUTION INTRAVENOUS at 14:09

## 2019-10-01 RX ADMIN — Medication 15 UNIT(S): at 12:20

## 2019-10-01 RX ADMIN — Medication 15 MILLIGRAM(S): at 03:05

## 2019-10-01 RX ADMIN — SODIUM CHLORIDE 75 MILLILITER(S): 9 INJECTION INTRAMUSCULAR; INTRAVENOUS; SUBCUTANEOUS at 10:20

## 2019-10-01 RX ADMIN — Medication 650 MILLIGRAM(S): at 21:38

## 2019-10-01 RX ADMIN — Medication 16 UNIT(S): at 17:35

## 2019-10-01 RX ADMIN — INSULIN GLARGINE 48 UNIT(S): 100 INJECTION, SOLUTION SUBCUTANEOUS at 22:24

## 2019-10-01 RX ADMIN — Medication 250 MILLIGRAM(S): at 17:41

## 2019-10-01 RX ADMIN — Medication 650 MILLIGRAM(S): at 14:08

## 2019-10-01 RX ADMIN — Medication 650 MILLIGRAM(S): at 20:57

## 2019-10-01 RX ADMIN — Medication 250 MILLIGRAM(S): at 08:44

## 2019-10-01 RX ADMIN — Medication 15 MILLIGRAM(S): at 03:20

## 2019-10-01 RX ADMIN — Medication 15 UNIT(S): at 08:43

## 2019-10-01 RX ADMIN — Medication 250 MILLIGRAM(S): at 00:08

## 2019-10-01 RX ADMIN — Medication 6: at 12:20

## 2019-10-01 NOTE — PROGRESS NOTE ADULT - PROBLEM SELECTOR PLAN 1
Hgb a1c of 11.6 uncontrolled on metformin 500mg BID as outpatient   Hyperglycemia   Target glucose 100-180  - increased lantus to 40 units qhs   - increased humalog to 15 units TIDAC   - continue moderate Humalog correctional scale before meals and bedtime  - Provider to RN insulin pen teaching    Discharge  Basal/bolus insulin (please contact endocrine for final recs prior to discharge)  patient had Lactic acidosis on arrival (likely due to infection rather than metformin)  To simplify regimen, discharge on basal/bolus only   Metformin can be restarted as outpatient at later time once DM is better controlled.  [facility]  Dr. Carrasquillo 12/3 11:15AM   [address] [address], [place] [address]  Migel Herndon Diabetes educator 10/28 11AM   [address] [address], [place], [place] [phone]. Hgb a1c of 11.6 uncontrolled on metformin 500mg BID as outpatient   Hyperglycemia   Target glucose 100-180  - increase lantus to 48 units qhs   - increased humalog to 16 units TIDAC   - continue moderate Humalog correctional scale before meals and bedtime  - Provider to RN insulin pen teaching    Discharge  Basal/bolus insulin (please contact endocrine for final recs prior to discharge)  patient had Lactic acidosis on arrival (likely due to infection rather than metformin)  To simplify regimen, discharge on basal/bolus only   Metformin can be restarted as outpatient at later time once DM is better controlled.  [facility]  Dr. Carrasquillo 12/3 11:15AM   [address] [address], [place] [address]  Migel Herndon Diabetes educator 10/28 11AM   [address] [address], [place], [place] [id number] [phone].

## 2019-10-01 NOTE — PROGRESS NOTE ADULT - ASSESSMENT
54M with poorly controlled diabetes mellitus here with R peritonsillar abscess and facial/neck cellulitis s/p drainage by ENT and   Cultures +Streptococcus anginosus +Granulicatella  +Haemophilus parainfluenzae.   repeat CT with decreased size of the abscess but increased soft tissue inflammatory changes, the  fluid extending to , submandibular spaces with involvement of the R pharyngeal wall and posterior nasal pharynx    Recommend:  -Continue zosyn 3.375 q 8  -Continue vanco 1 gram IV q 8 hours  -Monitor vanco trough  -Improvement in size of abscess, on last CT was 1.0 x 0.7 cm, however, soft tissue inflammation is worsening and extending   -ENT following   -Clinically improving, eating drinking, tolerating PO diet, swelling improving each day. Pain improving.  -On discharge can complete an additional 10 days on 10/11 with oral doxycycline 100 mg PO BID and augmentin 875/125 mg PO BID  -Followup in ID clinic in 2-3 weeks.  -For appointments can call [phone]    Discussed with NP

## 2019-10-01 NOTE — PROGRESS NOTE ADULT - SUBJECTIVE AND OBJECTIVE BOX
Patient seen and examined at the bedside. Reports neck swelling has improved, however, appears stable on my exam. States he is not performing gland massage secondary to pain.  States tolerating PO without difficulty, however, dry mucous membranes on exam    Vital Signs Last 24 Hrs  T(C): 37.2 (01 Oct 2019 02:09), Max: 37.2 (01 Oct 2019 02:09)  T(F): 99 (01 Oct 2019 02:09), Max: 99 (01 Oct 2019 02:09)  HR: 74 (01 Oct 2019 03:10) (74 - 98)  BP: 139/93 (01 Oct 2019 03:10) (121/78 - 155/98)  BP(mean): --  RR: 18 (01 Oct 2019 02:09) (16 - 18)  SpO2: 98% (01 Oct 2019 02:09) (94% - 100%)  NAD, awake and alert  No respiratory distress, stridor, or stertor  OU: EOMI  Nose: nares patent anteriorly  OC/OP: mucosa dry, tongue midline, floor of mouth soft, no masses or lesions, soft palate with decreased right bulging, no fluctuance, moderate TTP, bilateral tonsils 2+  Neck: Right submandibular gland induration, fullness, right submental fullness, overlying pitting edema with mild improvement, however, significant TTP (overall exam feels stable relative to a few days prior), no purulence from stensen's or merry's duct bilaterally, R LAD, full neck ROM    A/P:  54M with hx of DM with R PTA s/p I&D and submandibular gland swelling, possible sialadenitis vs. reactive from PTA. Sialoadenitis with minimal to no improvement. Vanc sub-therapeutic and not performing gland massage.  - Continue vanc/zosyn per ID. Vanc dose increased  - Continue aggressive massages of R SMG and PO vs IV hydration - please ensure patient has adequate pain control to perform gland massage  - Warm compresses to R SMG   - Sialogogues   - Strict glucose control, goal <150  - ORL to follow for clinical improvement

## 2019-10-01 NOTE — PROGRESS NOTE ADULT - SUBJECTIVE AND OBJECTIVE BOX
CC: Patient is a 54y old  Male who presents with a chief complaint of dysphagia (01 Oct 2019 09:43)    ID following for right facial swelling, abscess    Interval History/ROS: Patient states swelling improving. Eating and drinking. No fevers, no chills. Pain has improved.    Rest of ROS negative.    Allergies  No Known Allergies    ANTIMICROBIALS:  piperacillin/tazobactam IVPB.. 3.375 every 8 hours  vancomycin  IVPB 1000 every 8 hours    OTHER MEDS:  acetaminophen   Tablet .. 650 milliGRAM(s) Oral every 6 hours PRN  dextrose 40% Gel 15 Gram(s) Oral once PRN  dextrose 5%. 1000 milliLiter(s) IV Continuous <Continuous>  dextrose 50% Injectable 12.5 Gram(s) IV Push once  dextrose 50% Injectable 25 Gram(s) IV Push once  dextrose 50% Injectable 25 Gram(s) IV Push once  glucagon  Injectable 1 milliGRAM(s) IntraMuscular once PRN  influenza   Vaccine 0.5 milliLiter(s) IntraMuscular once  insulin glargine Injectable (LANTUS) 48 Unit(s) SubCutaneous at bedtime  insulin lispro (HumaLOG) corrective regimen sliding scale   SubCutaneous three times a day before meals  insulin lispro (HumaLOG) corrective regimen sliding scale   SubCutaneous at bedtime  insulin lispro Injectable (HumaLOG) 16 Unit(s) SubCutaneous three times a day before meals  morphine  - Injectable 4 milliGRAM(s) IV Push every 4 hours PRN  sodium chloride 0.9%. 1000 milliLiter(s) IV Continuous <Continuous>    PE:    Vital Signs Last 24 Hrs  T(C): 37 (01 Oct 2019 09:51), Max: 37.2 (01 Oct 2019 02:09)  T(F): 98.6 (01 Oct 2019 09:51), Max: 99 (01 Oct 2019 02:09)  HR: 75 (01 Oct 2019 09:51) (74 - 98)  BP: 116/72 (01 Oct 2019 09:51) (116/72 - 155/98)  BP(mean): --  RR: 20 (01 Oct 2019 09:51) (16 - 20)  SpO2: 96% (01 Oct 2019 09:51) (94% - 100%)    Gen: AOx3, NAD, non-toxic  HEENT: right facial swelling, tenderness, and induration; no drainage  CV: S1+S2 normal, no murmurs  Resp: Clear bilat, no resp distress  Abd: Soft, nontender, +BS  Ext: No LE edema, no wounds  : No Walker  IV/Skin: No thrombophlebitis  Neuro: no focal deficits    LABS:                          11.9   10.99 )-----------( 247      ( 01 Oct 2019 06:30 )             36.0       10-01    136  |  101  |  13  ----------------------------<  274<H>  4.6   |  23  |  0.76    Ca    9.2      01 Oct 2019 06:30  Phos  3.7     10-01  Mg     1.6     10-01    MICROBIOLOGY:  v  BLOOD PERIPHERAL  09-24-19 --  --  --      OTHER  09-23-19 --  --  --      .Blood  09-23-19   No growth at 5 days.  --  --    RADIOLOGY:    < from: CT Maxillofacial w/ IV Cont (09.27.19 @ 17:01) >  Impression:    Worsening soft tissue inflammatory changes with a with decreased size of   right peritonsillar abscess.. Increasing fluid extending into the    and submandibular spaces with involvement of the right   pharyngeal wall and posterior nasal pharynx.    < end of copied text >

## 2019-10-01 NOTE — PROGRESS NOTE ADULT - SUBJECTIVE AND OBJECTIVE BOX
Patient is a 54y old  Male who presents with a chief complaint of dysphagia (01 Oct 2019 14:12)    pt. seen and examined, still c/o pain over rt. submandibular region , tolerating diet   INTERVAL HPI/OVERNIGHT EVENTS:  T(C): 36.6 (10-01-19 @ 21:05), Max: 37.2 (10-01-19 @ 02:09)  HR: 90 (10-01-19 @ 21:05) (74 - 100)  BP: 149/96 (10-01-19 @ 21:05) (116/72 - 155/98)  RR: 17 (10-01-19 @ 21:05) (16 - 20)  SpO2: 99% (10-01-19 @ 21:05) (96% - 100%)  Wt(kg): --  I&O's Summary    30 Sep 2019 07:01  -  01 Oct 2019 07:00  --------------------------------------------------------  IN: 1450 mL / OUT: 0 mL / NET: 1450 mL        PAST MEDICAL & SURGICAL HISTORY:  Type 2 diabetes mellitus  No significant past surgical history      SOCIAL HISTORY  Alcohol:  Tobacco:  Illicit substance use:    FAMILY HISTORY:    REVIEW OF SYSTEMS:  CONSTITUTIONAL: No fever, weight loss, or fatigue  EYES: No eye pain, visual disturbances, or discharge  ENMT:  No difficulty hearing, tinnitus, vertigo; No sinus or throat pain  NECK: No pain or stiffness  RESPIRATORY: No cough, wheezing, chills or hemoptysis; No shortness of breath  CARDIOVASCULAR: No chest pain, palpitations, dizziness, or leg swelling  GASTROINTESTINAL: No abdominal or epigastric pain. No nausea, vomiting, or hematemesis; No diarrhea or constipation. No melena or hematochezia.  GENITOURINARY: No dysuria, frequency, hematuria, or incontinence  NEUROLOGICAL: No headaches, memory loss, loss of strength, numbness, or tremors  SKIN: No itching, burning, rashes, or lesions   LYMPH NODES: No enlarged glands  ENDOCRINE: No heat or cold intolerance; No hair loss  MUSCULOSKELETAL: No joint pain or swelling; No muscle, back, or extremity pain  PSYCHIATRIC: No depression, anxiety, mood swings, or difficulty sleeping  HEME/LYMPH: No easy bruising, or bleeding gums  ALLERY AND IMMUNOLOGIC: No hives or eczema    RADIOLOGY & ADDITIONAL TESTS:    Imaging Personally Reviewed:  [ ] YES  [ ] NO    Consultant(s) Notes Reviewed:  [ ] YES  [ ] NO    PHYSICAL EXAM:  GENERAL: NAD, well-groomed, well-developed  HEAD:  Atraumatic, Normocephalic  EYES: EOMI, PERRLA, conjunctiva and sclera clear  ENMT: No tonsillar erythema, exudates, or enlargement; Moist mucous membranes, Good dentition, No lesions  NECK: Supple, No JVD, Normal thyroid  NERVOUS SYSTEM:  Alert & Oriented X3, Good concentration; Motor Strength 5/5 B/L upper and lower extremities; DTRs 2+ intact and symmetric  CHEST/LUNG: Clear to percussion bilaterally; No rales, rhonchi, wheezing, or rubs  HEART: Regular rate and rhythm; No murmurs, rubs, or gallops  ABDOMEN: Soft, Nontender, Nondistended; Bowel sounds present  EXTREMITIES:  2+ Peripheral Pulses, No clubbing, cyanosis, or edema  LYMPH: No lymphadenopathy noted  SKIN: No rashes or lesions    LABS:                        11.9   10.99 )-----------( 247      ( 01 Oct 2019 06:30 )             36.0     10-01    136  |  101  |  13  ----------------------------<  274<H>  4.6   |  23  |  0.76    Ca    9.2      01 Oct 2019 06:30  Phos  3.7     10-01  Mg     1.6     10-01          CAPILLARY BLOOD GLUCOSE      POCT Blood Glucose.: 150 mg/dL (01 Oct 2019 16:52)  POCT Blood Glucose.: 253 mg/dL (01 Oct 2019 12:01)  POCT Blood Glucose.: 262 mg/dL (01 Oct 2019 08:22)  POCT Blood Glucose.: 192 mg/dL (30 Sep 2019 22:22)            MEDICATIONS  (STANDING):  dextrose 5%. 1000 milliLiter(s) (50 mL/Hr) IV Continuous <Continuous>  dextrose 50% Injectable 12.5 Gram(s) IV Push once  dextrose 50% Injectable 25 Gram(s) IV Push once  dextrose 50% Injectable 25 Gram(s) IV Push once  influenza   Vaccine 0.5 milliLiter(s) IntraMuscular once  insulin glargine Injectable (LANTUS) 48 Unit(s) SubCutaneous at bedtime  insulin lispro (HumaLOG) corrective regimen sliding scale   SubCutaneous three times a day before meals  insulin lispro (HumaLOG) corrective regimen sliding scale   SubCutaneous at bedtime  insulin lispro Injectable (HumaLOG) 16 Unit(s) SubCutaneous three times a day before meals  piperacillin/tazobactam IVPB.. 3.375 Gram(s) IV Intermittent every 8 hours  sodium chloride 0.9%. 1000 milliLiter(s) (75 mL/Hr) IV Continuous <Continuous>  vancomycin  IVPB 1000 milliGRAM(s) IV Intermittent every 8 hours    MEDICATIONS  (PRN):  acetaminophen   Tablet .. 650 milliGRAM(s) Oral every 6 hours PRN Mild Pain (1 - 3)  dextrose 40% Gel 15 Gram(s) Oral once PRN Blood Glucose LESS THAN 70 milliGRAM(s)/deciliter  glucagon  Injectable 1 milliGRAM(s) IntraMuscular once PRN Glucose LESS THAN 70 milligrams/deciliter  morphine  - Injectable 4 milliGRAM(s) IV Push every 4 hours PRN moderate to severe pain (4-10)      Care Discussed with Consultants/Other Providers [ ] YES  [ ] NO

## 2019-10-01 NOTE — PROGRESS NOTE ADULT - SUBJECTIVE AND OBJECTIVE BOX
Chief Complaint:     History:    MEDICATIONS  (STANDING):  dextrose 5%. 1000 milliLiter(s) (50 mL/Hr) IV Continuous <Continuous>  dextrose 50% Injectable 12.5 Gram(s) IV Push once  dextrose 50% Injectable 25 Gram(s) IV Push once  dextrose 50% Injectable 25 Gram(s) IV Push once  influenza   Vaccine 0.5 milliLiter(s) IntraMuscular once  insulin glargine Injectable (LANTUS) 40 Unit(s) SubCutaneous at bedtime  insulin lispro (HumaLOG) corrective regimen sliding scale   SubCutaneous three times a day before meals  insulin lispro (HumaLOG) corrective regimen sliding scale   SubCutaneous at bedtime  insulin lispro Injectable (HumaLOG) 15 Unit(s) SubCutaneous three times a day before meals  piperacillin/tazobactam IVPB.. 3.375 Gram(s) IV Intermittent every 8 hours  sodium chloride 0.9%. 1000 milliLiter(s) (50 mL/Hr) IV Continuous <Continuous>  vancomycin  IVPB 1000 milliGRAM(s) IV Intermittent every 8 hours    MEDICATIONS  (PRN):  acetaminophen   Tablet .. 650 milliGRAM(s) Oral every 6 hours PRN Mild Pain (1 - 3)  dextrose 40% Gel 15 Gram(s) Oral once PRN Blood Glucose LESS THAN 70 milliGRAM(s)/deciliter  glucagon  Injectable 1 milliGRAM(s) IntraMuscular once PRN Glucose LESS THAN 70 milligrams/deciliter  morphine  - Injectable 4 milliGRAM(s) IV Push every 4 hours PRN moderate to severe pain (4-10)      Allergies    No Known Allergies    Intolerances      Review of Systems:  Constitutional: No fever  Eyes: No blurry vision  Neuro: No tremors  HEENT: No pain  Cardiovascular: No chest pain, palpitations  Respiratory: No SOB, no cough  GI: No nausea, vomiting, abdominal pain  : No dysuria  Skin: no rash  Psych: no depression  Endocrine: no polyuria, polydipsia  Hem/lymph: no swelling  Osteoporosis: no fractures    ALL OTHER SYSTEMS REVIEWED AND NEGATIVE    UNABLE TO OBTAIN    PHYSICAL EXAM:  VITALS: T(C): 37.2 (10-01-19 @ 02:09)  T(F): 99 (10-01-19 @ 02:09), Max: 99 (10-01-19 @ 02:09)  HR: 74 (10-01-19 @ 03:10) (74 - 98)  BP: 139/93 (10-01-19 @ 03:10) (121/78 - 155/98)  RR:  (16 - 18)  SpO2:  (94% - 100%)  Wt(kg): --  GENERAL: NAD, well-groomed, well-developed  EYES: No proptosis, no lid lag, anicteric  HEENT:  Atraumatic, Normocephalic, moist mucous membranes  THYROID: Normal size, no palpable nodules  RESPIRATORY: Clear to auscultation bilaterally; No rales, rhonchi, wheezing, or rubs  CARDIOVASCULAR: Regular rate and rhythm; No murmurs; no peripheral edema  GI: Soft, nontender, non distended, normal bowel sounds  SKIN: Dry, intact, No rashes or lesions  MUSCULOSKELETAL: Full range of motion, normal strength  NEURO: sensation intact, extraocular movements intact, no tremor, normal reflexes  PSYCH: Alert and oriented x 3, normal affect, normal mood  CUSHING'S SIGNS: no striae    POCT Blood Glucose.: 262 mg/dL (10-01-19 @ 08:22)  POCT Blood Glucose.: 192 mg/dL (09-30-19 @ 22:22)  POCT Blood Glucose.: 232 mg/dL (09-30-19 @ 17:29)  POCT Blood Glucose.: 234 mg/dL (09-30-19 @ 12:20)  POCT Blood Glucose.: 301 mg/dL (09-30-19 @ 08:19)  POCT Blood Glucose.: 250 mg/dL (09-29-19 @ 21:51)  POCT Blood Glucose.: 238 mg/dL (09-29-19 @ 16:32)  POCT Blood Glucose.: 234 mg/dL (09-29-19 @ 11:57)  POCT Blood Glucose.: 319 mg/dL (09-29-19 @ 08:27)  POCT Blood Glucose.: 299 mg/dL (09-28-19 @ 22:06)  POCT Blood Glucose.: 269 mg/dL (09-28-19 @ 16:40)  POCT Blood Glucose.: 235 mg/dL (09-28-19 @ 11:57)      10-01    136  |  101  |  13  ----------------------------<  274<H>  4.6   |  23  |  0.76    EGFR if : 121  EGFR if non : 104    Ca    9.2      10-01  Mg     1.6     10-01  Phos  3.7     10-01            Thyroid Function Tests:      Hemoglobin A1C, Whole Blood: 11.6 % <H> [4.0 - 5.6] (09-27-19 @ 05:36)  Hemoglobin A1C, Whole Blood: 12.0 % <H> [4.0 - 5.6] (09-26-19 @ 21:10) Chief Complaint: T2DM    History: Patient continues to be hyperglycemic in 200 although better control than yesterday.     MEDICATIONS  (STANDING):  dextrose 5%. 1000 milliLiter(s) (50 mL/Hr) IV Continuous <Continuous>  dextrose 50% Injectable 12.5 Gram(s) IV Push once  dextrose 50% Injectable 25 Gram(s) IV Push once  dextrose 50% Injectable 25 Gram(s) IV Push once  influenza   Vaccine 0.5 milliLiter(s) IntraMuscular once  insulin glargine Injectable (LANTUS) 40 Unit(s) SubCutaneous at bedtime  insulin lispro (HumaLOG) corrective regimen sliding scale   SubCutaneous three times a day before meals  insulin lispro (HumaLOG) corrective regimen sliding scale   SubCutaneous at bedtime  insulin lispro Injectable (HumaLOG) 15 Unit(s) SubCutaneous three times a day before meals  piperacillin/tazobactam IVPB.. 3.375 Gram(s) IV Intermittent every 8 hours  sodium chloride 0.9%. 1000 milliLiter(s) (50 mL/Hr) IV Continuous <Continuous>  vancomycin  IVPB 1000 milliGRAM(s) IV Intermittent every 8 hours    MEDICATIONS  (PRN):  acetaminophen   Tablet .. 650 milliGRAM(s) Oral every 6 hours PRN Mild Pain (1 - 3)  dextrose 40% Gel 15 Gram(s) Oral once PRN Blood Glucose LESS THAN 70 milliGRAM(s)/deciliter  glucagon  Injectable 1 milliGRAM(s) IntraMuscular once PRN Glucose LESS THAN 70 milligrams/deciliter  morphine  - Injectable 4 milliGRAM(s) IV Push every 4 hours PRN moderate to severe pain (4-10)      Allergies    No Known Allergies    Intolerances      Review of Systems:  Constitutional: No fever  Eyes: No blurry vision  Neuro: No tremors  HEENT: No pain  Cardiovascular: No chest pain, palpitations  Respiratory: No SOB, no cough  GI: No nausea, vomiting, abdominal pain  : No dysuria  Skin: no rash  Psych: no depression  Endocrine: no polyuria, polydipsia  Hem/lymph: no swelling  Osteoporosis: no fractures    ALL OTHER SYSTEMS REVIEWED AND NEGATIVE        PHYSICAL EXAM:  VITALS: T(C): 37.2 (10-01-19 @ 02:09)  T(F): 99 (10-01-19 @ 02:09), Max: 99 (10-01-19 @ 02:09)  HR: 74 (10-01-19 @ 03:10) (74 - 98)  BP: 139/93 (10-01-19 @ 03:10) (121/78 - 155/98)  RR:  (16 - 18)  SpO2:  (94% - 100%)  Wt(kg): --  GENERAL: NAD, well-groomed, well-developed  EYES: No proptosis, no lid lag, anicteric  HEENT:  Atraumatic, Normocephalic, moist mucous membranes  THYROID: Normal size, no palpable nodules  RESPIRATORY: Clear to auscultation bilaterally; No rales, rhonchi, wheezing, or rubs  CARDIOVASCULAR: Regular rate and rhythm; No murmurs; no peripheral edema  GI: Soft, nontender, non distended, normal bowel sounds  PSYCH: Alert and oriented x 3, normal affect, normal mood      POCT Blood Glucose.: 262 mg/dL (10-01-19 @ 08:22)  POCT Blood Glucose.: 192 mg/dL (09-30-19 @ 22:22)  POCT Blood Glucose.: 232 mg/dL (09-30-19 @ 17:29)  POCT Blood Glucose.: 234 mg/dL (09-30-19 @ 12:20)  POCT Blood Glucose.: 301 mg/dL (09-30-19 @ 08:19)  POCT Blood Glucose.: 250 mg/dL (09-29-19 @ 21:51)  POCT Blood Glucose.: 238 mg/dL (09-29-19 @ 16:32)  POCT Blood Glucose.: 234 mg/dL (09-29-19 @ 11:57)  POCT Blood Glucose.: 319 mg/dL (09-29-19 @ 08:27)  POCT Blood Glucose.: 299 mg/dL (09-28-19 @ 22:06)  POCT Blood Glucose.: 269 mg/dL (09-28-19 @ 16:40)  POCT Blood Glucose.: 235 mg/dL (09-28-19 @ 11:57)      10-01    136  |  101  |  13  ----------------------------<  274<H>  4.6   |  23  |  0.76    EGFR if : 121  EGFR if non : 104    Ca    9.2      10-01  Mg     1.6     10-01  Phos  3.7     10-01                Hemoglobin A1C, Whole Blood: 11.6 % <H> [4.0 - 5.6] (09-27-19 @ 05:36)  Hemoglobin A1C, Whole Blood: 12.0 % <H> [4.0 - 5.6] (09-26-19 @ 21:10)

## 2019-10-02 DIAGNOSIS — E11.65 TYPE 2 DIABETES MELLITUS WITH HYPERGLYCEMIA: ICD-10-CM

## 2019-10-02 LAB
ANION GAP SERPL CALC-SCNC: 9 MMO/L — SIGNIFICANT CHANGE UP (ref 7–14)
BUN SERPL-MCNC: 14 MG/DL — SIGNIFICANT CHANGE UP (ref 7–23)
CALCIUM SERPL-MCNC: 8.7 MG/DL — SIGNIFICANT CHANGE UP (ref 8.4–10.5)
CHLORIDE SERPL-SCNC: 105 MMOL/L — SIGNIFICANT CHANGE UP (ref 98–107)
CO2 SERPL-SCNC: 24 MMOL/L — SIGNIFICANT CHANGE UP (ref 22–31)
CREAT SERPL-MCNC: 0.79 MG/DL — SIGNIFICANT CHANGE UP (ref 0.5–1.3)
GLUCOSE SERPL-MCNC: 119 MG/DL — HIGH (ref 70–99)
HCT VFR BLD CALC: 35.4 % — LOW (ref 39–50)
HGB BLD-MCNC: 11.7 G/DL — LOW (ref 13–17)
MAGNESIUM SERPL-MCNC: 1.7 MG/DL — SIGNIFICANT CHANGE UP (ref 1.6–2.6)
MCHC RBC-ENTMCNC: 25 PG — LOW (ref 27–34)
MCHC RBC-ENTMCNC: 33.1 % — SIGNIFICANT CHANGE UP (ref 32–36)
MCV RBC AUTO: 75.6 FL — LOW (ref 80–100)
NRBC # FLD: 0 K/UL — SIGNIFICANT CHANGE UP (ref 0–0)
PHOSPHATE SERPL-MCNC: 4.1 MG/DL — SIGNIFICANT CHANGE UP (ref 2.5–4.5)
PLATELET # BLD AUTO: 249 K/UL — SIGNIFICANT CHANGE UP (ref 150–400)
PMV BLD: 10.4 FL — SIGNIFICANT CHANGE UP (ref 7–13)
POTASSIUM SERPL-MCNC: 3.9 MMOL/L — SIGNIFICANT CHANGE UP (ref 3.5–5.3)
POTASSIUM SERPL-SCNC: 3.9 MMOL/L — SIGNIFICANT CHANGE UP (ref 3.5–5.3)
RBC # BLD: 4.68 M/UL — SIGNIFICANT CHANGE UP (ref 4.2–5.8)
RBC # FLD: 15.2 % — HIGH (ref 10.3–14.5)
SODIUM SERPL-SCNC: 138 MMOL/L — SIGNIFICANT CHANGE UP (ref 135–145)
WBC # BLD: 12.14 K/UL — HIGH (ref 3.8–10.5)
WBC # FLD AUTO: 12.14 K/UL — HIGH (ref 3.8–10.5)

## 2019-10-02 PROCEDURE — 99232 SBSQ HOSP IP/OBS MODERATE 35: CPT

## 2019-10-02 RX ADMIN — Medication 650 MILLIGRAM(S): at 02:59

## 2019-10-02 RX ADMIN — Medication 650 MILLIGRAM(S): at 12:08

## 2019-10-02 RX ADMIN — Medication 650 MILLIGRAM(S): at 23:50

## 2019-10-02 RX ADMIN — SODIUM CHLORIDE 75 MILLILITER(S): 9 INJECTION INTRAMUSCULAR; INTRAVENOUS; SUBCUTANEOUS at 08:26

## 2019-10-02 RX ADMIN — PIPERACILLIN AND TAZOBACTAM 25 GRAM(S): 4; .5 INJECTION, POWDER, LYOPHILIZED, FOR SOLUTION INTRAVENOUS at 22:36

## 2019-10-02 RX ADMIN — Medication 250 MILLIGRAM(S): at 19:27

## 2019-10-02 RX ADMIN — Medication 16 UNIT(S): at 17:44

## 2019-10-02 RX ADMIN — Medication 650 MILLIGRAM(S): at 11:26

## 2019-10-02 RX ADMIN — Medication 16 UNIT(S): at 12:51

## 2019-10-02 RX ADMIN — SODIUM CHLORIDE 75 MILLILITER(S): 9 INJECTION INTRAMUSCULAR; INTRAVENOUS; SUBCUTANEOUS at 22:36

## 2019-10-02 RX ADMIN — PIPERACILLIN AND TAZOBACTAM 25 GRAM(S): 4; .5 INJECTION, POWDER, LYOPHILIZED, FOR SOLUTION INTRAVENOUS at 15:04

## 2019-10-02 RX ADMIN — PIPERACILLIN AND TAZOBACTAM 25 GRAM(S): 4; .5 INJECTION, POWDER, LYOPHILIZED, FOR SOLUTION INTRAVENOUS at 05:19

## 2019-10-02 RX ADMIN — Medication 16 UNIT(S): at 08:26

## 2019-10-02 RX ADMIN — SODIUM CHLORIDE 75 MILLILITER(S): 9 INJECTION INTRAMUSCULAR; INTRAVENOUS; SUBCUTANEOUS at 17:44

## 2019-10-02 RX ADMIN — Medication 650 MILLIGRAM(S): at 03:30

## 2019-10-02 RX ADMIN — Medication 650 MILLIGRAM(S): at 17:44

## 2019-10-02 RX ADMIN — Medication 250 MILLIGRAM(S): at 09:53

## 2019-10-02 RX ADMIN — INSULIN GLARGINE 48 UNIT(S): 100 INJECTION, SOLUTION SUBCUTANEOUS at 22:36

## 2019-10-02 RX ADMIN — Medication 250 MILLIGRAM(S): at 01:10

## 2019-10-02 NOTE — PROGRESS NOTE ADULT - SUBJECTIVE AND OBJECTIVE BOX
Patient is a 54y old  Male who presents with a chief complaint of dysphagia (02 Oct 2019 17:22)      INTERVAL HPI/OVERNIGHT EVENTS:  T(C): 36.8 (10-02-19 @ 17:28), Max: 37 (10-02-19 @ 01:37)  HR: 71 (10-02-19 @ 17:28) (71 - 90)  BP: 122/75 (10-02-19 @ 17:28) (122/75 - 149/96)  RR: 17 (10-02-19 @ 17:28) (16 - 18)  SpO2: 97% (10-02-19 @ 17:28) (96% - 99%)  Wt(kg): --  I&O's Summary    02 Oct 2019 07:01  -  02 Oct 2019 20:32  --------------------------------------------------------  IN: 1450 mL / OUT: 0 mL / NET: 1450 mL        PAST MEDICAL & SURGICAL HISTORY:  Type 2 diabetes mellitus  No significant past surgical history      SOCIAL HISTORY  Alcohol:  Tobacco:  Illicit substance use:    FAMILY HISTORY:    REVIEW OF SYSTEMS:  CONSTITUTIONAL: No fever, weight loss, or fatigue  EYES: No eye pain, visual disturbances, or discharge  ENMT:  No difficulty hearing, tinnitus, vertigo; No sinus or throat pain  NECK: No pain or stiffness  RESPIRATORY: No cough, wheezing, chills or hemoptysis; No shortness of breath  CARDIOVASCULAR: No chest pain, palpitations, dizziness, or leg swelling  GASTROINTESTINAL: No abdominal or epigastric pain. No nausea, vomiting, or hematemesis; No diarrhea or constipation. No melena or hematochezia.  GENITOURINARY: No dysuria, frequency, hematuria, or incontinence  NEUROLOGICAL: No headaches, memory loss, loss of strength, numbness, or tremors  SKIN: No itching, burning, rashes, or lesions   LYMPH NODES: No enlarged glands  ENDOCRINE: No heat or cold intolerance; No hair loss  MUSCULOSKELETAL: No joint pain or swelling; No muscle, back, or extremity pain  PSYCHIATRIC: No depression, anxiety, mood swings, or difficulty sleeping  HEME/LYMPH: No easy bruising, or bleeding gums  ALLERY AND IMMUNOLOGIC: No hives or eczema    RADIOLOGY & ADDITIONAL TESTS:    Imaging Personally Reviewed:  [ ] YES  [ ] NO    Consultant(s) Notes Reviewed:  [ ] YES  [ ] NO    PHYSICAL EXAM:  GENERAL: NAD, well-groomed, well-developed  HEAD:  Atraumatic, Normocephalic  EYES: EOMI, PERRLA, conjunctiva and sclera clear  ENMT: No tonsillar erythema, exudates, or enlargement; Moist mucous membranes, Good dentition, No lesions  NECK: Supple, No JVD, Normal thyroid  NERVOUS SYSTEM:  Alert & Oriented X3, Good concentration; Motor Strength 5/5 B/L upper and lower extremities; DTRs 2+ intact and symmetric  CHEST/LUNG: Clear to percussion bilaterally; No rales, rhonchi, wheezing, or rubs  HEART: Regular rate and rhythm; No murmurs, rubs, or gallops  ABDOMEN: Soft, Nontender, Nondistended; Bowel sounds present  EXTREMITIES:  2+ Peripheral Pulses, No clubbing, cyanosis, or edema  LYMPH: No lymphadenopathy noted  SKIN: No rashes or lesions    LABS:                        11.7   12.14 )-----------( 249      ( 02 Oct 2019 05:45 )             35.4     10-02    138  |  105  |  14  ----------------------------<  119<H>  3.9   |  24  |  0.79    Ca    8.7      02 Oct 2019 05:45  Phos  4.1     10-02  Mg     1.7     10-02          CAPILLARY BLOOD GLUCOSE      POCT Blood Glucose.: 123 mg/dL (02 Oct 2019 17:31)  POCT Blood Glucose.: 127 mg/dL (02 Oct 2019 12:03)  POCT Blood Glucose.: 111 mg/dL (02 Oct 2019 08:04)  POCT Blood Glucose.: 146 mg/dL (01 Oct 2019 22:21)            MEDICATIONS  (STANDING):  dextrose 5%. 1000 milliLiter(s) (50 mL/Hr) IV Continuous <Continuous>  dextrose 50% Injectable 12.5 Gram(s) IV Push once  dextrose 50% Injectable 25 Gram(s) IV Push once  dextrose 50% Injectable 25 Gram(s) IV Push once  influenza   Vaccine 0.5 milliLiter(s) IntraMuscular once  insulin glargine Injectable (LANTUS) 48 Unit(s) SubCutaneous at bedtime  insulin lispro (HumaLOG) corrective regimen sliding scale   SubCutaneous three times a day before meals  insulin lispro (HumaLOG) corrective regimen sliding scale   SubCutaneous at bedtime  insulin lispro Injectable (HumaLOG) 16 Unit(s) SubCutaneous three times a day before meals  piperacillin/tazobactam IVPB.. 3.375 Gram(s) IV Intermittent every 8 hours  sodium chloride 0.9%. 1000 milliLiter(s) (75 mL/Hr) IV Continuous <Continuous>  vancomycin  IVPB 1000 milliGRAM(s) IV Intermittent every 8 hours    MEDICATIONS  (PRN):  acetaminophen   Tablet .. 650 milliGRAM(s) Oral every 6 hours PRN Mild Pain (1 - 3)  dextrose 40% Gel 15 Gram(s) Oral once PRN Blood Glucose LESS THAN 70 milliGRAM(s)/deciliter  glucagon  Injectable 1 milliGRAM(s) IntraMuscular once PRN Glucose LESS THAN 70 milligrams/deciliter  morphine  - Injectable 4 milliGRAM(s) IV Push every 4 hours PRN moderate to severe pain (4-10)      Care Discussed with Consultants/Other Providers [ ] YES  [ ] NO Patient is a 54y old  Male who presents with a chief complaint of dysphagia (02 Oct 2019 17:22)    pt. seen and examined, still rt. submandibular swelling , decrease in pain and no difficulty in swallowing now   INTERVAL HPI/OVERNIGHT EVENTS:  T(C): 36.8 (10-02-19 @ 17:28), Max: 37 (10-02-19 @ 01:37)  HR: 71 (10-02-19 @ 17:28) (71 - 90)  BP: 122/75 (10-02-19 @ 17:28) (122/75 - 149/96)  RR: 17 (10-02-19 @ 17:28) (16 - 18)  SpO2: 97% (10-02-19 @ 17:28) (96% - 99%)  Wt(kg): --  I&O's Summary    02 Oct 2019 07:01  -  02 Oct 2019 20:32  --------------------------------------------------------  IN: 1450 mL / OUT: 0 mL / NET: 1450 mL        PAST MEDICAL & SURGICAL HISTORY:  Type 2 diabetes mellitus  No significant past surgical history      SOCIAL HISTORY  Alcohol:  Tobacco:  Illicit substance use:    FAMILY HISTORY:    REVIEW OF SYSTEMS:  CONSTITUTIONAL: No fever, weight loss, or fatigue  EYES: No eye pain, visual disturbances, or discharge  ENMT:  No difficulty hearing, tinnitus, vertigo; No sinus or throat pain  NECK: No pain or stiffness  RESPIRATORY: No cough, wheezing, chills or hemoptysis; No shortness of breath  CARDIOVASCULAR: No chest pain, palpitations, dizziness, or leg swelling  GASTROINTESTINAL: No abdominal or epigastric pain. No nausea, vomiting, or hematemesis; No diarrhea or constipation. No melena or hematochezia.  GENITOURINARY: No dysuria, frequency, hematuria, or incontinence  NEUROLOGICAL: No headaches, memory loss, loss of strength, numbness, or tremors  SKIN: No itching, burning, rashes, or lesions   LYMPH NODES: No enlarged glands  ENDOCRINE: No heat or cold intolerance; No hair loss  MUSCULOSKELETAL: No joint pain or swelling; No muscle, back, or extremity pain  PSYCHIATRIC: No depression, anxiety, mood swings, or difficulty sleeping  HEME/LYMPH: No easy bruising, or bleeding gums  ALLERY AND IMMUNOLOGIC: No hives or eczema    RADIOLOGY & ADDITIONAL TESTS:    Imaging Personally Reviewed:  [ ] YES  [ ] NO    Consultant(s) Notes Reviewed:  [ ] YES  [ ] NO    PHYSICAL EXAM:  GENERAL: NAD, well-groomed, well-developed  HEAD:  Atraumatic, Normocephalic  EYES: EOMI, PERRLA, conjunctiva and sclera clear  ENMT: No tonsillar erythema, exudates, or enlargement; Moist mucous membranes, Good dentition, No lesions  NECK: Supple, No JVD, Normal thyroid  NERVOUS SYSTEM:  Alert & Oriented X3, Good concentration; Motor Strength 5/5 B/L upper and lower extremities; DTRs 2+ intact and symmetric  CHEST/LUNG: Clear to percussion bilaterally; No rales, rhonchi, wheezing, or rubs  HEART: Regular rate and rhythm; No murmurs, rubs, or gallops  ABDOMEN: Soft, Nontender, Nondistended; Bowel sounds present  EXTREMITIES:  2+ Peripheral Pulses, No clubbing, cyanosis, or edema  LYMPH: No lymphadenopathy noted  SKIN: No rashes or lesions    LABS:                        11.7   12.14 )-----------( 249      ( 02 Oct 2019 05:45 )             35.4     10-02    138  |  105  |  14  ----------------------------<  119<H>  3.9   |  24  |  0.79    Ca    8.7      02 Oct 2019 05:45  Phos  4.1     10-02  Mg     1.7     10-02          CAPILLARY BLOOD GLUCOSE      POCT Blood Glucose.: 123 mg/dL (02 Oct 2019 17:31)  POCT Blood Glucose.: 127 mg/dL (02 Oct 2019 12:03)  POCT Blood Glucose.: 111 mg/dL (02 Oct 2019 08:04)  POCT Blood Glucose.: 146 mg/dL (01 Oct 2019 22:21)            MEDICATIONS  (STANDING):  dextrose 5%. 1000 milliLiter(s) (50 mL/Hr) IV Continuous <Continuous>  dextrose 50% Injectable 12.5 Gram(s) IV Push once  dextrose 50% Injectable 25 Gram(s) IV Push once  dextrose 50% Injectable 25 Gram(s) IV Push once  influenza   Vaccine 0.5 milliLiter(s) IntraMuscular once  insulin glargine Injectable (LANTUS) 48 Unit(s) SubCutaneous at bedtime  insulin lispro (HumaLOG) corrective regimen sliding scale   SubCutaneous three times a day before meals  insulin lispro (HumaLOG) corrective regimen sliding scale   SubCutaneous at bedtime  insulin lispro Injectable (HumaLOG) 16 Unit(s) SubCutaneous three times a day before meals  piperacillin/tazobactam IVPB.. 3.375 Gram(s) IV Intermittent every 8 hours  sodium chloride 0.9%. 1000 milliLiter(s) (75 mL/Hr) IV Continuous <Continuous>  vancomycin  IVPB 1000 milliGRAM(s) IV Intermittent every 8 hours    MEDICATIONS  (PRN):  acetaminophen   Tablet .. 650 milliGRAM(s) Oral every 6 hours PRN Mild Pain (1 - 3)  dextrose 40% Gel 15 Gram(s) Oral once PRN Blood Glucose LESS THAN 70 milliGRAM(s)/deciliter  glucagon  Injectable 1 milliGRAM(s) IntraMuscular once PRN Glucose LESS THAN 70 milligrams/deciliter  morphine  - Injectable 4 milliGRAM(s) IV Push every 4 hours PRN moderate to severe pain (4-10)      Care Discussed with Consultants/Other Providers [ ] YES  [ ] NO

## 2019-10-02 NOTE — PROGRESS NOTE ADULT - ASSESSMENT
54M with poorly controlled diabetes mellitus here with R peritonsillar abscess and facial/neck cellulitis s/p drainage by ENT and   Cultures +Streptococcus anginosus +Granulicatella  +Haemophilus parainfluenzae.   repeat CT with decreased size of the abscess but increased soft tissue inflammatory changes, the  fluid extending to , submandibular spaces with involvement of the R pharyngeal wall and posterior nasal pharynx    Recommend:  -Continue zosyn 3.375 q 8  -Continue vanco 1 gram IV q 8 hours  -Monitor vanco trough  -Improvement in size of abscess, on last CT was 1.0 x 0.7 cm, however, soft tissue inflammation is worsening and extending   -ENT following   -Clinically improving, eating drinking, tolerating PO diet, swelling improving each day. Pain improving.  -On discharge can complete an additional 10 days on 10/11 with oral doxycycline 100 mg PO BID and augmentin 875/125 mg PO BID  -Followup in ID clinic in 2-3 weeks.  -For appointments can call [phone]

## 2019-10-02 NOTE — PROGRESS NOTE ADULT - PROBLEM SELECTOR PROBLEM 3
HTN (hypertension), benign
Anemia, unspecified type
HTN (hypertension), benign
Anemia, unspecified type

## 2019-10-02 NOTE — PROGRESS NOTE ADULT - PROBLEM SELECTOR PROBLEM 2
Other hyperlipidemia
Type 2 diabetes mellitus without complication, without long-term current use of insulin
Type 2 diabetes mellitus without complication, without long-term current use of insulin
Other hyperlipidemia
Type 2 diabetes mellitus without complication, without long-term current use of insulin

## 2019-10-02 NOTE — PROGRESS NOTE ADULT - SUBJECTIVE AND OBJECTIVE BOX
CC: Patient is a 54y old  Male who presents with a chief complaint of dysphagia (02 Oct 2019 06:13)    ID following for right facial swelling, abscess    Interval History/ROS: Patient states swelling and pain continues to improve. Eating well. No dysphagia. No fevers, no chills.     Rest of ROS negative.    Allergies  No Known Allergies    ANTIMICROBIALS:  piperacillin/tazobactam IVPB.. 3.375 every 8 hours  vancomycin  IVPB 1000 every 8 hours    OTHER MEDS:  acetaminophen   Tablet .. 650 milliGRAM(s) Oral every 6 hours PRN  dextrose 40% Gel 15 Gram(s) Oral once PRN  dextrose 5%. 1000 milliLiter(s) IV Continuous <Continuous>  dextrose 50% Injectable 12.5 Gram(s) IV Push once  dextrose 50% Injectable 25 Gram(s) IV Push once  dextrose 50% Injectable 25 Gram(s) IV Push once  glucagon  Injectable 1 milliGRAM(s) IntraMuscular once PRN  influenza   Vaccine 0.5 milliLiter(s) IntraMuscular once  insulin glargine Injectable (LANTUS) 48 Unit(s) SubCutaneous at bedtime  insulin lispro (HumaLOG) corrective regimen sliding scale   SubCutaneous three times a day before meals  insulin lispro (HumaLOG) corrective regimen sliding scale   SubCutaneous at bedtime  insulin lispro Injectable (HumaLOG) 16 Unit(s) SubCutaneous three times a day before meals  morphine  - Injectable 4 milliGRAM(s) IV Push every 4 hours PRN  sodium chloride 0.9%. 1000 milliLiter(s) IV Continuous <Continuous>    PE:    Vital Signs Last 24 Hrs  T(C): 36.7 (02 Oct 2019 10:30), Max: 37 (02 Oct 2019 01:37)  T(F): 98.1 (02 Oct 2019 10:30), Max: 98.6 (02 Oct 2019 01:37)  HR: 82 (02 Oct 2019 10:30) (75 - 100)  BP: 131/82 (02 Oct 2019 10:30) (122/84 - 149/96)  BP(mean): --  RR: 17 (02 Oct 2019 10:30) (16 - 18)  SpO2: 98% (02 Oct 2019 10:30) (96% - 100%)    Gen: AOx3, NAD, non-toxic, pleasant  HEENT: right facial swelling and tenderness improving  CV: S1+S2 normal, no murmurs  Resp: Clear bilat, no resp distress  Abd: Soft, nontender, +BS  Ext: No LE edema, no wounds  : No Walker  IV/Skin: No thrombophlebitis  Neuro: no focal deficits    LABS:                          11.7   12.14 )-----------( 249      ( 02 Oct 2019 05:45 )             35.4       10-02    138  |  105  |  14  ----------------------------<  119<H>  3.9   |  24  |  0.79    Ca    8.7      02 Oct 2019 05:45  Phos  4.1     10-02  Mg     1.7     10-02    MICROBIOLOGY:  Vancomycin Level, Trough: 11.2 ug/mL (10-01-19 @ 16:10)  v  BLOOD PERIPHERAL  09-24-19 --  --  --      OTHER  09-23-19 --  --  --      .Blood  09-23-19   No growth at 5 days.  --  --    RADIOLOGY:    < from: CT Maxillofacial w/ IV Cont (09.27.19 @ 17:01) >  Impression:    Worsening soft tissue inflammatory changes with a with decreased size of   right peritonsillar abscess.. Increasing fluid extending into the    and submandibular spaces with involvement of the right   pharyngeal wall and posterior nasal pharynx.    < end of copied text >

## 2019-10-02 NOTE — PROGRESS NOTE ADULT - SUBJECTIVE AND OBJECTIVE BOX
Chief Complaint: DM 2    History: Patient seen at bedside with wife present. Patient reports he is eating well. Denies n/v, denies s/s of hypoglycemia. BG and insulin use reviewed, patient glucose trending well on current regimen, last  mg/dl. Plan for diabetes management outpatient reviewed, patient and wife verbalize understanding.    MEDICATIONS  (STANDING):  dextrose 5%. 1000 milliLiter(s) (50 mL/Hr) IV Continuous <Continuous>  dextrose 50% Injectable 12.5 Gram(s) IV Push once  dextrose 50% Injectable 25 Gram(s) IV Push once  dextrose 50% Injectable 25 Gram(s) IV Push once  influenza   Vaccine 0.5 milliLiter(s) IntraMuscular once  insulin glargine Injectable (LANTUS) 48 Unit(s) SubCutaneous at bedtime  insulin lispro (HumaLOG) corrective regimen sliding scale   SubCutaneous three times a day before meals  insulin lispro (HumaLOG) corrective regimen sliding scale   SubCutaneous at bedtime  insulin lispro Injectable (HumaLOG) 16 Unit(s) SubCutaneous three times a day before meals  piperacillin/tazobactam IVPB.. 3.375 Gram(s) IV Intermittent every 8 hours  sodium chloride 0.9%. 1000 milliLiter(s) (75 mL/Hr) IV Continuous <Continuous>  vancomycin  IVPB 1000 milliGRAM(s) IV Intermittent every 8 hours    MEDICATIONS  (PRN):  acetaminophen   Tablet .. 650 milliGRAM(s) Oral every 6 hours PRN Mild Pain (1 - 3)  dextrose 40% Gel 15 Gram(s) Oral once PRN Blood Glucose LESS THAN 70 milliGRAM(s)/deciliter  glucagon  Injectable 1 milliGRAM(s) IntraMuscular once PRN Glucose LESS THAN 70 milligrams/deciliter  morphine  - Injectable 4 milliGRAM(s) IV Push every 4 hours PRN moderate to severe pain (4-10)    No Known Allergies    Review of Systems:  Constitutional: No fever  Cardiovascular: No chest pain  Respiratory: No SOB  GI: No nausea, vomiting    PHYSICAL EXAM:  VITALS: T(C): 36.7 (10-02-19 @ 14:10)  T(F): 98 (10-02-19 @ 14:10), Max: 98.6 (10-02-19 @ 01:37)  HR: 84 (10-02-19 @ 14:10) (75 - 90)  BP: 129/88 (10-02-19 @ 14:10) (122/84 - 149/96)  RR:  (16 - 18)  SpO2:  (96% - 99%)  Wt(kg): --  GENERAL: NAD  EYES: No proptosis, anicteric  RESPIRATORY: unlabored respirations   NEURO: extraocular movements intact, no tremor  PSYCH: Alert and oriented x 3    CAPILLARY BLOOD GLUCOSE    POCT Blood Glucose.: 127 mg/dL (02 Oct 2019 12:03)  POCT Blood Glucose.: 111 mg/dL (02 Oct 2019 08:04)  POCT Blood Glucose.: 146 mg/dL (01 Oct 2019 22:21)      10-02    138  |  105  |  14  ----------------------------<  119<H>  3.9   |  24  |  0.79    EGFR if : 119  EGFR if non : 103    Ca    8.7      10-02  Mg     1.7     10-02  Phos  4.1     10-02        Hemoglobin A1C, Whole Blood: 11.6 % <H> [4.0 - 5.6] (09-27-19 @ 05:36)  Hemoglobin A1C, Whole Blood: 12.0 % <H> [4.0 - 5.6] (09-26-19 @ 21:10)

## 2019-10-02 NOTE — PROGRESS NOTE ADULT - PROBLEM SELECTOR PLAN 3
Not at goal of 130/80  Management as per primary team
microcytic anemia, check iron studies
microcytic anemia, check iron studies
Not at goal of 130/80  Management as per primary team
microcytic anemia, check iron studies

## 2019-10-02 NOTE — PROGRESS NOTE ADULT - ASSESSMENT
54M h/o T2DM uncontrolled, HTN, HLD p/w peritonsilar abscess now on vanc/zosyn and s/p decadron. HbA1c 11.6%.    1. DM 2 with hyperglycemia, without long term current use of insulin   -Patient with Hgb a1c of 11.6% uncontrolled on metformin 500mg PO BID as outpatient. Reviewed with patient importance of continuing basal/bolus insulin plan outpatient, patient and wife verbalize understanding.   -Target glucose 100-180 mg/dl, patient within target today on current regimen  -Would continue Lantus 48 units SQ qHS  -Continue Humalog 16 units SQ TID before meals (Hold if NPO/not eating meal)   -Continue moderate Humalog correctional scale before meals and moderate scale before bedtime  -Provider to RN in place, please continue insulin pen and glucometer teaching    Discharge Plan:  -Basal/bolus insulin pens (please contact endocrine for final dose recommendations prior to discharge)  -Patient had lactic acidosis on arrival (likely due to infection rather than metformin) however; to simplify regimen, would discharge on basal/bolus only for now and re-evaluate outpatient   -Options for basal insulin pens -Lantus or Basaglar, options for bolus insulin pens - Novolog, Humalog, Admelog. Please send prescription to pharmacy to verify insurance preferred insulin pens  -Please ensure patient has supplies including glucometer (per insurance), glucose test strips, lancets, alcohol swabs and insulin pen needles  -Reviewed hypoglycemia and hyperglycemia recognition and treatment with patient and wife. Patient should contact MD if experiencing hypoglycemia (below 70 mg/dl) or persistent hyperglycemia (above 300 mg/dl x2 consecutively)  -Endocrine follow up appointments scheduled:  1. With diabetes educator Katrina: 10/28/19 at 11AM, [address] [address], [place] [address], [phone]  2. With Dr. Carrasquillo: 12/3/19 at 11:15AM, [address] [address], [place] [address], [phone]. 54M h/o T2DM uncontrolled, HTN, HLD p/w peritonsilar abscess now on vanc/zosyn and s/p decadron. HbA1c 11.6%.    1. DM 2 with hyperglycemia, without long term current use of insulin   -Patient with Hgb a1c of 11.6% uncontrolled on metformin 500mg PO BID as outpatient. Reviewed with patient importance of continuing basal/bolus insulin plan outpatient, patient and wife verbalize understanding.   -Target glucose 100-180 mg/dl, patient within target today on current regimen  -Would continue Lantus 48 units SQ qHS  -Continue Humalog 16 units SQ TID before meals (Hold if NPO/not eating meal)   -Continue moderate Humalog correctional scale before meals and moderate scale before bedtime  -Continue to check BG before meals and bedtime  -Continue consistent carbohydrate diet, patient has been seen by registered dietitian for counseling this admission  -Provider to RN in place, please continue insulin pen and glucometer teaching    Discharge Plan:  -Basal/bolus insulin pens (please contact endocrine for final dose recommendations prior to discharge)  -Patient had lactic acidosis on arrival (likely due to infection rather than metformin) however; to simplify regimen, would discharge on basal/bolus only for now and re-evaluate outpatient   -Options for basal insulin pens -Lantus or Basaglar, options for bolus insulin pens - Novolog, Humalog, Admelog. Please send prescription to pharmacy to verify insurance preferred insulin pens  -Please ensure patient has supplies including glucometer (per insurance), glucose test strips, lancets, alcohol swabs and insulin pen needles  -Reviewed hypoglycemia and hyperglycemia recognition and treatment with patient and wife. Patient should contact MD if experiencing hypoglycemia (below 70 mg/dl) or persistent hyperglycemia (above 300 mg/dl x2 consecutively)  -Endocrine follow up appointments scheduled:  1. With diabetes educator Katrina: 10/28/19 at 11AM, [address] [address], [place] [address], [phone]  2. With Dr. Carrasquillo: 12/3/19 at 11:15AM, [address] [facility], [facility] [address], [place] [address], [phone].

## 2019-10-02 NOTE — PROGRESS NOTE ADULT - SUBJECTIVE AND OBJECTIVE BOX
Patient seen and examined, no acute events. Afebrile. Vanc trough 11. Still reporting pain with palpation of R face, not completing facial massage.     T(C): 36.4 (10-02-19 @ 05:26), Max: 37 (10-01-19 @ 09:51)  HR: 75 (10-02-19 @ 05:26) (75 - 100)  BP: 122/84 (10-02-19 @ 05:26) (116/72 - 149/96)  RR: 16 (10-02-19 @ 05:26) (16 - 20)  SpO2: 96% (10-02-19 @ 05:26) (96% - 100%)  NAD, awake and alert  No respiratory distress, stridor, or stertor  OU: EOMI  Nose: nares patent anteriorly  OC/OP: mucosa dry, tongue midline, floor of mouth soft, no masses or lesions, soft palate decrease in bulge on R side, bilateral tonsils 2+  Neck: Right submandibular gland induration, fullness, right submental fullness, overlying pitting edema with mild improvement, however, significant TTP (overall exam feels stable relative to a few days prior), no purulence from stensen's or merry's duct bilaterally, R LAD, full neck ROM    FSG 120s    A/P:  54M with hx of DM with R PTA s/p I&D and submandibular gland swelling, possible sialadenitis vs. reactive from PTA. Sialoadenitis with minimal to no improvement. Vanc sub-therapeutic and not performing gland massage.  - Continue vanc/zosyn per ID. Vanc dose increased  - Continue aggressive massages of R SMG and PO vs IV hydration - please ensure patient has adequate pain control to perform gland massage  - Warm compresses to R SMG   - Sialogogues   - Strict glucose control, goal <150  - ORL to follow for clinical improvement

## 2019-10-03 ENCOUNTER — TRANSCRIPTION ENCOUNTER (OUTPATIENT)
Age: 54
End: 2019-10-03

## 2019-10-03 VITALS
OXYGEN SATURATION: 100 % | SYSTOLIC BLOOD PRESSURE: 133 MMHG | DIASTOLIC BLOOD PRESSURE: 85 MMHG | HEART RATE: 72 BPM | TEMPERATURE: 98 F | RESPIRATION RATE: 18 BRPM

## 2019-10-03 DIAGNOSIS — J36 PERITONSILLAR ABSCESS: ICD-10-CM

## 2019-10-03 DIAGNOSIS — Z79.84 LONG TERM (CURRENT) USE OF ORAL HYPOGLYCEMIC DRUGS: ICD-10-CM

## 2019-10-03 DIAGNOSIS — B95.5 UNSPECIFIED STREPTOCOCCUS AS THE CAUSE OF DISEASES CLASSIFIED ELSEWHERE: ICD-10-CM

## 2019-10-03 DIAGNOSIS — B96.3 HEMOPHILUS INFLUENZAE [H. INFLUENZAE] AS THE CAUSE OF DISEASES CLASSIFIED ELSEWHERE: ICD-10-CM

## 2019-10-03 DIAGNOSIS — L03.211 CELLULITIS OF FACE: ICD-10-CM

## 2019-10-03 DIAGNOSIS — A41.9 SEPSIS, UNSPECIFIED ORGANISM: ICD-10-CM

## 2019-10-03 DIAGNOSIS — R13.10 DYSPHAGIA, UNSPECIFIED: ICD-10-CM

## 2019-10-03 DIAGNOSIS — E11.9 TYPE 2 DIABETES MELLITUS WITHOUT COMPLICATIONS: ICD-10-CM

## 2019-10-03 LAB
ANION GAP SERPL CALC-SCNC: 12 MMO/L — SIGNIFICANT CHANGE UP (ref 7–14)
BASOPHILS # BLD AUTO: 0.04 K/UL — SIGNIFICANT CHANGE UP (ref 0–0.2)
BASOPHILS NFR BLD AUTO: 0.3 % — SIGNIFICANT CHANGE UP (ref 0–2)
BUN SERPL-MCNC: 17 MG/DL — SIGNIFICANT CHANGE UP (ref 7–23)
CALCIUM SERPL-MCNC: 9.5 MG/DL — SIGNIFICANT CHANGE UP (ref 8.4–10.5)
CHLORIDE SERPL-SCNC: 104 MMOL/L — SIGNIFICANT CHANGE UP (ref 98–107)
CO2 SERPL-SCNC: 23 MMOL/L — SIGNIFICANT CHANGE UP (ref 22–31)
CREAT SERPL-MCNC: 0.98 MG/DL — SIGNIFICANT CHANGE UP (ref 0.5–1.3)
EOSINOPHIL # BLD AUTO: 0.25 K/UL — SIGNIFICANT CHANGE UP (ref 0–0.5)
EOSINOPHIL NFR BLD AUTO: 2 % — SIGNIFICANT CHANGE UP (ref 0–6)
GLUCOSE SERPL-MCNC: 228 MG/DL — HIGH (ref 70–99)
HCT VFR BLD CALC: 39 % — SIGNIFICANT CHANGE UP (ref 39–50)
HGB BLD-MCNC: 12.1 G/DL — LOW (ref 13–17)
IMM GRANULOCYTES NFR BLD AUTO: 0.6 % — SIGNIFICANT CHANGE UP (ref 0–1.5)
LYMPHOCYTES # BLD AUTO: 28.3 % — SIGNIFICANT CHANGE UP (ref 13–44)
LYMPHOCYTES # BLD AUTO: 3.51 K/UL — HIGH (ref 1–3.3)
MCHC RBC-ENTMCNC: 24.2 PG — LOW (ref 27–34)
MCHC RBC-ENTMCNC: 31 % — LOW (ref 32–36)
MCV RBC AUTO: 78 FL — LOW (ref 80–100)
MONOCYTES # BLD AUTO: 0.98 K/UL — HIGH (ref 0–0.9)
MONOCYTES NFR BLD AUTO: 7.9 % — SIGNIFICANT CHANGE UP (ref 2–14)
NEUTROPHILS # BLD AUTO: 7.53 K/UL — HIGH (ref 1.8–7.4)
NEUTROPHILS NFR BLD AUTO: 60.9 % — SIGNIFICANT CHANGE UP (ref 43–77)
NRBC # FLD: 0 K/UL — SIGNIFICANT CHANGE UP (ref 0–0)
PLATELET # BLD AUTO: 273 K/UL — SIGNIFICANT CHANGE UP (ref 150–400)
PMV BLD: 10.7 FL — SIGNIFICANT CHANGE UP (ref 7–13)
POTASSIUM SERPL-MCNC: 4.4 MMOL/L — SIGNIFICANT CHANGE UP (ref 3.5–5.3)
POTASSIUM SERPL-SCNC: 4.4 MMOL/L — SIGNIFICANT CHANGE UP (ref 3.5–5.3)
RBC # BLD: 5 M/UL — SIGNIFICANT CHANGE UP (ref 4.2–5.8)
RBC # FLD: 15.6 % — HIGH (ref 10.3–14.5)
SODIUM SERPL-SCNC: 139 MMOL/L — SIGNIFICANT CHANGE UP (ref 135–145)
VANCOMYCIN TROUGH SERPL-MCNC: 13.2 UG/ML — SIGNIFICANT CHANGE UP (ref 10–20)
WBC # BLD: 12.39 K/UL — HIGH (ref 3.8–10.5)
WBC # FLD AUTO: 12.39 K/UL — HIGH (ref 3.8–10.5)

## 2019-10-03 PROCEDURE — 99233 SBSQ HOSP IP/OBS HIGH 50: CPT

## 2019-10-03 RX ORDER — INSULIN GLARGINE 100 [IU]/ML
14 INJECTION, SOLUTION SUBCUTANEOUS
Qty: 3 | Refills: 0 | DISCHARGE
Start: 2019-10-03 | End: 2019-11-01

## 2019-10-03 RX ORDER — INSULIN LISPRO 100/ML
14 VIAL (ML) SUBCUTANEOUS
Qty: 6 | Refills: 0
Start: 2019-10-03 | End: 2019-11-01

## 2019-10-03 RX ORDER — INSULIN GLARGINE 100 [IU]/ML
46 INJECTION, SOLUTION SUBCUTANEOUS
Qty: 3 | Refills: 0
Start: 2019-10-03 | End: 2019-11-01

## 2019-10-03 RX ORDER — ACETAMINOPHEN 500 MG
2 TABLET ORAL
Qty: 0 | Refills: 0 | DISCHARGE
Start: 2019-10-03

## 2019-10-03 RX ADMIN — Medication 650 MILLIGRAM(S): at 06:03

## 2019-10-03 RX ADMIN — Medication 650 MILLIGRAM(S): at 13:50

## 2019-10-03 RX ADMIN — Medication 650 MILLIGRAM(S): at 06:33

## 2019-10-03 RX ADMIN — Medication 250 MILLIGRAM(S): at 03:12

## 2019-10-03 RX ADMIN — INFLUENZA VIRUS VACCINE 0.5 MILLILITER(S): 15; 15; 15; 15 SUSPENSION INTRAMUSCULAR at 13:02

## 2019-10-03 RX ADMIN — PIPERACILLIN AND TAZOBACTAM 25 GRAM(S): 4; .5 INJECTION, POWDER, LYOPHILIZED, FOR SOLUTION INTRAVENOUS at 06:03

## 2019-10-03 RX ADMIN — Medication 16 UNIT(S): at 08:27

## 2019-10-03 RX ADMIN — Medication 650 MILLIGRAM(S): at 00:40

## 2019-10-03 RX ADMIN — Medication 250 MILLIGRAM(S): at 11:55

## 2019-10-03 RX ADMIN — Medication 16 UNIT(S): at 12:52

## 2019-10-03 RX ADMIN — Medication 650 MILLIGRAM(S): at 12:56

## 2019-10-03 RX ADMIN — Medication 2: at 12:52

## 2019-10-03 RX ADMIN — Medication 2: at 08:27

## 2019-10-03 NOTE — PROGRESS NOTE ADULT - SUBJECTIVE AND OBJECTIVE BOX
Patient seen and examined, no acute events. Afebrile. Vanc trough 13.2. Improved pain with palpation of R face/neck. Says he's been massaging his gland as instructed    AVSS  NAD, awake and alert  No respiratory distress, stridor, or stertor  OU: EOMI  Nose: nares patent anteriorly  OC/OP: mucosa dry, tongue midline, floor of mouth soft, no masses or lesions, soft palate decrease in bulge on R side, bilateral tonsils 2+  Neck: Right submandibular gland induration, fullness, right submental fullness, mild to moderate TTP, no purulence from stensen's or merry's duct bilaterally, R LAD, full neck ROM    FSG 120s    A/P:  54M with hx of DM with R PTA s/p I&D and submandibular gland swelling, possible sialadenitis vs. reactive from PTA. Improving now since vancomycin dose was increased, FSG in better control, and patient started to do massages.   - Continue vanc/zosyn per ID  - Continue aggressive massages of R SMG and PO vs IV hydration - please ensure patient has adequate pain control to perform gland massage  - Warm compresses to R SMG   - Sialogogues   - Strict glucose control, goal <150  - ORL to follow for clinical improvement

## 2019-10-03 NOTE — PROGRESS NOTE ADULT - REASON FOR ADMISSION
dysphagia

## 2019-10-03 NOTE — DISCHARGE NOTE NURSING/CASE MANAGEMENT/SOCIAL WORK - PATIENT PORTAL LINK FT
You can access the FollowMyHealth Patient Portal offered by [facility] by registering at the following website: [email or web address]. By joining [facility]’s FollowMyHealth portal, you will also be able to view your health information using other applications (apps) compatible with our system.

## 2019-10-03 NOTE — CHART NOTE - NSCHARTNOTEFT_GEN_A_CORE
On 10/3 this case was reviewed with Dr. Merritt, the patient is medically stable and optimized for discharge. All medications were reviewed and prescriptions were sent to mutually agreed upon pharmacy.

## 2019-10-03 NOTE — PROGRESS NOTE ADULT - PROBLEM SELECTOR PROBLEM 1
Sepsis, due to unspecified organism, unspecified whether acute organ dysfunction present
Type 2 diabetes mellitus with hyperglycemia, without long-term current use of insulin
Type 2 diabetes mellitus with hyperglycemia, without long-term current use of insulin
Type 2 diabetes mellitus without complication, without long-term current use of insulin
Sepsis, due to unspecified organism, unspecified whether acute organ dysfunction present
Type 2 diabetes mellitus without complication, without long-term current use of insulin
Sepsis, due to unspecified organism, unspecified whether acute organ dysfunction present
Sepsis, due to unspecified organism, unspecified whether acute organ dysfunction present

## 2019-10-03 NOTE — PROGRESS NOTE ADULT - ASSESSMENT
54M h/o T2DM uncontrolled, HTN, HLD p/w peritonsilar abscess now on vanc/zosyn and s/p decadron. HbA1c 11.6%.    1. DM 2 with hyperglycemia, without long term current use of insulin   -Patient with Hgb a1c of 11.6% uncontrolled on metformin 500mg PO BID as outpatient. Reviewed with patient importance of continuing basal/bolus insulin plan outpatient, patient and wife verbalize understanding. Wife was able to perform successful return demonstration today with NP of insulin pen use and verbalizes understanding of glucometer use (has been using at home previously). Patient and wife report practicing self injection with meals during hospitalization.   While inpatient:  -Target glucose 100-180 mg/dl, patient within target today on current regimen  -Would continue Lantus 48 units SQ qHS  -Continue Humalog 16 units SQ TID before meals (Hold if NPO/not eating meal)   -Continue moderate Humalog correctional scale before meals and moderate scale before bedtime  -Continue to check BG before meals and bedtime  -Continue consistent carbohydrate diet, patient has been seen by registered dietitian for counseling this admission    Discharge Plan:  -Basal/bolus insulin pens. Per primary team, patient planned for discharge today. Would decrease doses slightly for discharge, as patient will no longer be on intermittent IV ABX with dextrose, also will resume home activity after discharge. Would dc on Lantus 46 units SQ qHS and Humalog 14 units SQ TID before meals (Hold if not eating meal). No sliding scale for discharge home.  -Patient had lactic acidosis on arrival (likely due to infection rather than metformin) however; to simplify regimen, would discharge on basal/bolus only for now and re-evaluate outpatient   -Options for basal insulin pens -Lantus or Basaglar, options for bolus insulin pens - Novolog, Humalog, Admelog. Please send prescription to pharmacy to verify insurance preferred insulin pens  -Please ensure patient has supplies including glucometer (patient reports he currently has one), glucose test strips (to test glucose 4x daily), lancets, alcohol swabs and insulin pen needles (enough for 4x injections per day)  -Reviewed hypoglycemia and hyperglycemia recognition and treatment with patient and wife. Patient should contact MD if experiencing hypoglycemia (below 70 mg/dl) or persistent hyperglycemia (above 300 mg/dl x2 consecutively)  -Endocrine follow up appointments scheduled:  1. With diabetes educator Katrina: 10/28/19 at 11AM, [address] [facility] [address], [address], [place] [address], [phone]  2. With Dr. Carrasquillo: 12/3/19 at 11:15AM, [address] [facility] [facility] [address], [address], [place] [address], [phone].    Discharge plan discussed with primary team, KRISTEN You

## 2019-10-03 NOTE — DISCHARGE NOTE NURSING/CASE MANAGEMENT/SOCIAL WORK - NSDCFUADDAPPT_GEN_ALL_CORE_FT
**Please follow up with the Infectious Disease and ENT teams in their clinics within 1 - 2 weeks of discharge.  Dr. Roach (ID), Phone: [phone] --> 2 - 3 weeks  Dr. Palomo, Phone: [phone] -->  1 - 2 weeks

## 2019-10-03 NOTE — PROGRESS NOTE ADULT - PROVIDER SPECIALTY LIST ADULT
ENT
Endocrinology
Infectious Disease
Internal Medicine
Infectious Disease

## 2019-10-03 NOTE — PROGRESS NOTE ADULT - SUBJECTIVE AND OBJECTIVE BOX
Chief Complaint: DM 2    History: Patient seen at bedside with wife, reports he is feeling well and will likely be going home today. Tolerating meals, denies n/v, denies s/s of hypoglycemia. DM discharge plan and insulin pen use reviewed, patient and wife verbalized understanding.    MEDICATIONS  (STANDING):  dextrose 5%. 1000 milliLiter(s) (50 mL/Hr) IV Continuous <Continuous>  dextrose 50% Injectable 12.5 Gram(s) IV Push once  dextrose 50% Injectable 25 Gram(s) IV Push once  dextrose 50% Injectable 25 Gram(s) IV Push once  insulin glargine Injectable (LANTUS) 48 Unit(s) SubCutaneous at bedtime  insulin lispro (HumaLOG) corrective regimen sliding scale   SubCutaneous three times a day before meals  insulin lispro (HumaLOG) corrective regimen sliding scale   SubCutaneous at bedtime  insulin lispro Injectable (HumaLOG) 16 Unit(s) SubCutaneous three times a day before meals  piperacillin/tazobactam IVPB.. 3.375 Gram(s) IV Intermittent every 8 hours  sodium chloride 0.9%. 1000 milliLiter(s) (75 mL/Hr) IV Continuous <Continuous>  vancomycin  IVPB 1000 milliGRAM(s) IV Intermittent every 8 hours    MEDICATIONS  (PRN):  acetaminophen   Tablet .. 650 milliGRAM(s) Oral every 6 hours PRN Mild Pain (1 - 3)  dextrose 40% Gel 15 Gram(s) Oral once PRN Blood Glucose LESS THAN 70 milliGRAM(s)/deciliter  glucagon  Injectable 1 milliGRAM(s) IntraMuscular once PRN Glucose LESS THAN 70 milligrams/deciliter  morphine  - Injectable 4 milliGRAM(s) IV Push every 4 hours PRN moderate to severe pain (4-10)    No Known Allergies    Review of Systems:  Constitutional: No fever  Cardiovascular: No chest pain  Respiratory: No SOB  GI: No nausea, vomiting    PHYSICAL EXAM:  VITALS: T(C): 36.9 (10-03-19 @ 11:01)  T(F): 98.5 (10-03-19 @ 11:01), Max: 98.5 (10-03-19 @ 11:01)  HR: 72 (10-03-19 @ 11:01) (71 - 86)  BP: 133/85 (10-03-19 @ 11:01) (122/75 - 144/94)  RR:  (16 - 18)  SpO2:  (96% - 100%)  Wt(kg): --  GENERAL: NAD  EYES: No proptosis, anicteric  RESPIRATORY: unlabored respirations   MUSCULOSKELETAL: moving all extremities spontaneously   PSYCH: Alert and oriented x 3, normal affect, normal mood    CAPILLARY BLOOD GLUCOSE    POCT Blood Glucose.: 170 mg/dL (03 Oct 2019 12:07)  POCT Blood Glucose.: 185 mg/dL (03 Oct 2019 08:11)  POCT Blood Glucose.: 193 mg/dL (02 Oct 2019 22:07)  POCT Blood Glucose.: 123 mg/dL (02 Oct 2019 17:31)      10-03    139  |  104  |  17  ----------------------------<  228<H>  4.4   |  23  |  0.98    EGFR if : 102  EGFR if non : 88    Ca    9.5      10-03  Mg     1.7     10-02  Phos  4.1     10-02        Hemoglobin A1C, Whole Blood: 11.6 % <H> [4.0 - 5.6] (09-27-19 @ 05:36)  Hemoglobin A1C, Whole Blood: 12.0 % <H> [4.0 - 5.6] (09-26-19 @ 21:10)

## 2019-10-10 DIAGNOSIS — Z71.89 OTHER SPECIFIED COUNSELING: ICD-10-CM

## 2019-10-28 ENCOUNTER — APPOINTMENT (OUTPATIENT)
Dept: ENDOCRINOLOGY | Facility: CLINIC | Age: 54
End: 2019-10-28
Payer: MEDICAID

## 2019-10-28 PROCEDURE — G0108 DIAB MANAGE TRN  PER INDIV: CPT

## 2019-10-30 ENCOUNTER — CLINICAL ADVICE (OUTPATIENT)
Age: 54
End: 2019-10-30

## 2019-12-03 ENCOUNTER — APPOINTMENT (OUTPATIENT)
Dept: ENDOCRINOLOGY | Facility: CLINIC | Age: 54
End: 2019-12-03
Payer: MEDICAID

## 2019-12-03 VITALS
BODY MASS INDEX: 38.14 KG/M2 | SYSTOLIC BLOOD PRESSURE: 115 MMHG | HEART RATE: 94 BPM | HEIGHT: 67 IN | OXYGEN SATURATION: 97 % | DIASTOLIC BLOOD PRESSURE: 70 MMHG | WEIGHT: 243 LBS

## 2019-12-03 PROCEDURE — 99215 OFFICE O/P EST HI 40 MIN: CPT

## 2019-12-04 ENCOUNTER — CLINICAL ADVICE (OUTPATIENT)
Age: 54
End: 2019-12-04

## 2019-12-04 LAB
ALBUMIN SERPL ELPH-MCNC: 4.7 G/DL
ALP BLD-CCNC: 39 U/L
ALT SERPL-CCNC: 24 U/L
ANION GAP SERPL CALC-SCNC: 13 MMOL/L
AST SERPL-CCNC: 19 U/L
BILIRUB SERPL-MCNC: 0.4 MG/DL
BUN SERPL-MCNC: 14 MG/DL
C PEPTIDE SERPL-MCNC: 1.3 NG/ML
CALCIUM SERPL-MCNC: 10.2 MG/DL
CHLORIDE SERPL-SCNC: 103 MMOL/L
CHOLEST SERPL-MCNC: 177 MG/DL
CHOLEST/HDLC SERPL: 2.8 RATIO
CO2 SERPL-SCNC: 28 MMOL/L
CREAT SERPL-MCNC: 0.87 MG/DL
GLUCOSE SERPL-MCNC: 146 MG/DL
HDLC SERPL-MCNC: 64 MG/DL
LACTATE BLDA-MCNC: 1.5 MMOL/L
LDLC SERPL CALC-MCNC: 74 MG/DL
POTASSIUM SERPL-SCNC: 4.2 MMOL/L
PROT SERPL-MCNC: 7.8 G/DL
SODIUM SERPL-SCNC: 144 MMOL/L
TRIGL SERPL-MCNC: 194 MG/DL
TSH SERPL-ACNC: 1.6 UIU/ML
VIT B12 SERPL-MCNC: 851 PG/ML

## 2019-12-09 ENCOUNTER — APPOINTMENT (OUTPATIENT)
Dept: ENDOCRINOLOGY | Facility: CLINIC | Age: 54
End: 2019-12-09
Payer: MEDICAID

## 2019-12-09 PROCEDURE — 95251 CONT GLUC MNTR ANALYSIS I&R: CPT

## 2019-12-09 PROCEDURE — 95250 CONT GLUC MNTR PHYS/QHP EQP: CPT

## 2019-12-09 PROCEDURE — G0108 DIAB MANAGE TRN  PER INDIV: CPT

## 2020-01-21 ENCOUNTER — RX RENEWAL (OUTPATIENT)
Age: 55
End: 2020-01-21

## 2020-02-06 ENCOUNTER — RX RENEWAL (OUTPATIENT)
Age: 55
End: 2020-02-06

## 2020-02-18 ENCOUNTER — RX RENEWAL (OUTPATIENT)
Age: 55
End: 2020-02-18

## 2020-03-03 ENCOUNTER — APPOINTMENT (OUTPATIENT)
Dept: ENDOCRINOLOGY | Facility: CLINIC | Age: 55
End: 2020-03-03

## 2020-04-13 ENCOUNTER — RX RENEWAL (OUTPATIENT)
Age: 55
End: 2020-04-13

## 2020-06-02 RX ORDER — INSULIN LISPRO 100 U/ML
100 INJECTION, SOLUTION SUBCUTANEOUS
Qty: 3 | Refills: 0 | Status: DISCONTINUED | COMMUNITY
Start: 2019-10-30 | End: 2020-06-02

## 2020-06-08 NOTE — ED ADULT NURSE NOTE - NSFALLRSKHARMRISK_ED_ALL_ED
Health Maintenance Due   Topic Date Due   • Breast Cancer Screening  05/15/2020       Patient is up to date, no discussion needed.    Mammogram appt today    
no

## 2020-07-06 ENCOUNTER — RX RENEWAL (OUTPATIENT)
Age: 55
End: 2020-07-06

## 2020-07-06 RX ORDER — PEN NEEDLE, DIABETIC 31 G X1/4"
32G X 4 MM NEEDLE, DISPOSABLE MISCELLANEOUS
Qty: 100 | Refills: 0 | Status: ACTIVE | COMMUNITY
Start: 2020-07-06 | End: 1900-01-01

## 2020-07-27 ENCOUNTER — RX RENEWAL (OUTPATIENT)
Age: 55
End: 2020-07-27

## 2020-07-27 RX ORDER — LANCETS 30 GAUGE
EACH MISCELLANEOUS
Qty: 100 | Refills: 5 | Status: ACTIVE | COMMUNITY
Start: 2020-07-06 | End: 1900-01-01

## 2020-07-28 ENCOUNTER — APPOINTMENT (OUTPATIENT)
Dept: ENDOCRINOLOGY | Facility: CLINIC | Age: 55
End: 2020-07-28

## 2020-08-24 ENCOUNTER — RX RENEWAL (OUTPATIENT)
Age: 55
End: 2020-08-24

## 2020-08-25 ENCOUNTER — RX RENEWAL (OUTPATIENT)
Age: 55
End: 2020-08-25

## 2020-08-26 ENCOUNTER — RX RENEWAL (OUTPATIENT)
Age: 55
End: 2020-08-26

## 2020-09-17 ENCOUNTER — RX RENEWAL (OUTPATIENT)
Age: 55
End: 2020-09-17

## 2020-09-29 ENCOUNTER — RX RENEWAL (OUTPATIENT)
Age: 55
End: 2020-09-29

## 2020-10-12 ENCOUNTER — RX RENEWAL (OUTPATIENT)
Age: 55
End: 2020-10-12

## 2020-10-14 ENCOUNTER — APPOINTMENT (OUTPATIENT)
Dept: ENDOCRINOLOGY | Facility: CLINIC | Age: 55
End: 2020-10-14
Payer: MEDICAID

## 2020-10-14 VITALS
SYSTOLIC BLOOD PRESSURE: 137 MMHG | WEIGHT: 248 LBS | HEART RATE: 82 BPM | BODY MASS INDEX: 38.84 KG/M2 | OXYGEN SATURATION: 98 % | TEMPERATURE: 97.7 F | DIASTOLIC BLOOD PRESSURE: 80 MMHG

## 2020-10-14 LAB
GLUCOSE BLDC GLUCOMTR-MCNC: 245
HBA1C MFR BLD HPLC: 8.7

## 2020-10-14 PROCEDURE — 83036 HEMOGLOBIN GLYCOSYLATED A1C: CPT | Mod: GC,QW

## 2020-10-14 PROCEDURE — 99215 OFFICE O/P EST HI 40 MIN: CPT | Mod: 25,GC

## 2020-10-14 PROCEDURE — 82962 GLUCOSE BLOOD TEST: CPT | Mod: GC

## 2020-10-15 LAB
ALBUMIN SERPL ELPH-MCNC: 4.6 G/DL
ALP BLD-CCNC: 35 U/L
ALT SERPL-CCNC: 18 U/L
ANION GAP SERPL CALC-SCNC: 11 MMOL/L
AST SERPL-CCNC: 20 U/L
BILIRUB SERPL-MCNC: 0.6 MG/DL
BUN SERPL-MCNC: 11 MG/DL
CALCIUM SERPL-MCNC: 10.1 MG/DL
CHLORIDE SERPL-SCNC: 99 MMOL/L
CHOLEST SERPL-MCNC: 131 MG/DL
CHOLEST/HDLC SERPL: 2.7 RATIO
CO2 SERPL-SCNC: 25 MMOL/L
CREAT SERPL-MCNC: 0.77 MG/DL
CREAT SPEC-SCNC: 78 MG/DL
GLUCOSE SERPL-MCNC: 166 MG/DL
HDLC SERPL-MCNC: 49 MG/DL
LDLC SERPL CALC-MCNC: 57 MG/DL
MICROALBUMIN 24H UR DL<=1MG/L-MCNC: <1.2 MG/DL
MICROALBUMIN/CREAT 24H UR-RTO: NORMAL MG/G
POTASSIUM SERPL-SCNC: 4.3 MMOL/L
PROT SERPL-MCNC: 7 G/DL
SODIUM SERPL-SCNC: 135 MMOL/L
TRIGL SERPL-MCNC: 126 MG/DL
VIT B12 SERPL-MCNC: 802 PG/ML

## 2020-10-18 NOTE — REVIEW OF SYSTEMS
[As Noted in HPI] : as noted in HPI [Negative] : Heme/Lymph [All other systems negative] : All other systems negative [Fatigue] : no fatigue [Decreased Appetite] : appetite not decreased [Eye Pain] : no pain [Blurred Vision] : no blurred vision [Dysphagia] : no dysphagia [Neck Pain] : no neck pain [Chest Pain] : no chest pain [Dysphonia] : no dysphonia [Palpitations] : no palpitations [Shortness Of Breath] : no shortness of breath [Cough] : no cough [Abdominal Pain] : no abdominal pain [Nausea] : no nausea [Vomiting] : no vomiting [Polyuria] : no polyuria [Headaches] : no headaches [Tremors] : no tremors [Polydipsia] : no polydipsia [Swelling] : no swelling [de-identified] : Forgetful on occasions

## 2020-10-18 NOTE — HISTORY OF PRESENT ILLNESS
[FreeTextEntry1] : 55 M h/o T2DM, HTN, HLD here for follow up visit. \par \par No acute events since last visit in Dec 2019, but reports occasionally he is forgetful. \par \par DM history: T2DM for 10 years. Patient was hospitalized in Sept 2019 for peritonsillar abscess and with lipomas throughout, A1c was 12% then (states it was 17 previously), was seen by inpatient endocrine team. Was only following with PCP prior to that. He was on metformin at that time (Has tried glipizide in the past but was switched to metformin because it was not working well) which was held due to elevated lactate and he was discharged on basaglar 46 units SQ qHS and ademelog 14 units SQ TID.  \par \par At his initial visit in Dec 2019, basal / bolus regimen was adjusted and metformin 500 mg bid was restarted which was later increased to 1000 mg bid. Additionally, he was not taking dinner time admelog then so was advised to take admelog tid and not bid. \par \par Current DM regimen: Basaglar 45 units sq qdaily; Admelog 14 units bid (before breakfast and dinner), Metformin 1000 mg bid. \par He does not take Admelog with lunch. Does rotates site and changes needles. He keeps all the insulin pens in refrigerator even the ones he has opened for use. \par \par Checks FSG's at home 2 times a day \par AM: 120s-150s and PM: 160s-200s. Once in a while >250s. No hypoglycemia or hypoglycemic symptoms reported. \par \par Diet: \par Breakfast: plantain, fish brown OR crackers and cheese or roti and brown\par Lunch / Snacks: bagel / croissant with coffee (no sugar) OR [employer] Sandwith\par Dinner plantain, roti, brown and rarely little brown rice.\par No juice, no soda. \par \par Exercise: Does not exercise, used to walk and do treadmill but not any more. \par \par Complications of diabetes: \par Micro: denies retinopathy, nephropathy, neuropathy, gastroparesis, orthostasis, foot ulcers, onchymycosis\par Macro: denies myocardial infarction, stroke, peripheral vascular disease, carotid stenosis\par Hypoglycemic awareness: yes \par Hx of DKA or hospitalizations: no hx of DKA \par On glucocorticoids: none \par S/p pancreatitis/pancreatectomy: none \par \par Has appointment to see opthalmology and podiatry this month. \par \par No history of HTN and not on any medications, BP slightly high today, no urine microalbumin / cr checked in the past. \par \par On Simvastatin 10 mg qhs. \par \par \par PMH: Type 2 diabetes mellitus\par PSH: No significant past surgical history\par FH: type 2 diabetes father and mother\par SH: No hx of tobacco or etoh abuse. Works in construction. \par

## 2020-10-18 NOTE — PHYSICAL EXAM
[Alert] : alert [Well Nourished] : well nourished [No Acute Distress] : no acute distress [Well Developed] : well developed [Normal Sclera/Conjunctiva] : normal sclera/conjunctiva [No Proptosis] : no proptosis [Normal Hearing] : hearing was normal [No Thyroid Nodules] : no palpable thyroid nodules [Thyroid Not Enlarged] : the thyroid was not enlarged [No Respiratory Distress] : no respiratory distress [No Accessory Muscle Use] : no accessory muscle use [Clear to Auscultation] : lungs were clear to auscultation bilaterally [Normal S1, S2] : normal S1 and S2 [Normal Rate] : heart rate was normal [No Edema] : no peripheral edema [Regular Rhythm] : with a regular rhythm [Pedal Pulses Normal] : the pedal pulses are present [Normal Bowel Sounds] : normal bowel sounds [Not Distended] : not distended [Not Tender] : non-tender [Soft] : abdomen soft [Normal Gait] : normal gait [No Clubbing, Cyanosis] : no clubbing  or cyanosis of the fingernails [No Rash] : no rash [Right Foot Was Examined] : right foot ~C was examined [Left Foot Was Examined] : left foot ~C was examined [Full ROM] : with full range of motion [2+] : 2+ in the dorsalis pedis [No Motor Deficits] : the motor exam was normal [Oriented x3] : oriented to person, place, and time [No Tremors] : no tremors [Normal Affect] : the affect was normal [Normal Mood] : the mood was normal [Tenderness] : not tender [Swelling] : not swollen [Diminished Throughout Both Feet] : normal tactile sensation with monofilament testing throughout both feet [Erythema] : not erythematous [de-identified] : Obese

## 2020-10-18 NOTE — ASSESSMENT
[FreeTextEntry1] : 55 M h/o uncontrolled T2DM, HTN, HLD. \par \par #DM\par A1c today 8.7%, was 12% in 9/2019. Goal is 6.7-7%. \par Based on the reported FS date, recommended to continue current DM regimen but also advised to take lunch time admelog. \par Basalgar 45 units sq qdaily. \par Admelog 14 units sq ac TID (can take 10 units with lunch if not eating too much)\par Metformin 1000mg BID \par explained to the  pt about the improtanace of regular f/u and needs to check SMBG while on MDI \par explained risks of taking insulin and not checking SMBG such as hypoglycemia \par \par we need data and a log to make changes\par i think GLP-1 would be great for him \par \par Discussed about benefits and risks of GLP-1 anlaog and will send prescription to see if approved by insurance but recommended not to take until he has dilated eye exam which he is scheduled this month due to the fact that if he has retinopathy, GLP-1 analog would not be a good choice as it can worsen the retinopathy. Patient expresses understating. \par Professional paul placed today \par \par - Discussed diagnosis and implications on cardiovascular disease, noted goals for each hemoglobin A1C, blood pressure and LDL cholesterol\par - Check hemoglobin A1C every 3 months, 6 months if stable \par - BP goal - <130/80\par - ACC/AHA recommendation for statin therapy: adults with LDL cholesterol better than 190, adults age 40 to 75 diabetes, and adults 40 to 75 with a 10 year risk of 7.5 or greater. (check lipids on simvastatin 10 mg qhs)\par - Lengthy discussion on dietary choices, stressed lean protein low in saturated fats; increasing fiber (25-38 gm daily), and reducing processed foods\par -pt encouraged to see nutritionist and CDE to help with diet and lifestyle changes which are cornerstone to DM treatment \par - Continue/encourage exercise\par - Ophthalmology annually (Patient with type 2  dm should have an initial dilated and comprehensive eye exam by an ophthalmologist or optometrist at the time of the diabetes diagnosis because microvascular changes can begin before diagnosis)- referral given by PCP and he says he has appt this month \par - Check urine microalbumin ratio, goal <30 mg/g (he is not on ACE/ARB)\par - Denies neuropathy; daily feet checks/keep feet covered \par - we discussed glycemic goals and need for close and routine followup \par - we discussed importance of self monitoring of blood glucose and to call if glucose <70 or >400\par \par Short term followup with CDE and than 3 months with MD. \par \par Seen and discussed with Dr. Carrasquillo. \par \par \par

## 2020-10-19 ENCOUNTER — NON-APPOINTMENT (OUTPATIENT)
Age: 55
End: 2020-10-19

## 2020-10-21 ENCOUNTER — APPOINTMENT (OUTPATIENT)
Dept: ENDOCRINOLOGY | Facility: CLINIC | Age: 55
End: 2020-10-21
Payer: MEDICAID

## 2020-10-21 VITALS — WEIGHT: 251 LBS | BODY MASS INDEX: 39.31 KG/M2

## 2020-10-21 PROCEDURE — G0108 DIAB MANAGE TRN  PER INDIV: CPT

## 2020-10-29 ENCOUNTER — RX RENEWAL (OUTPATIENT)
Age: 55
End: 2020-10-29

## 2020-11-03 ENCOUNTER — RX RENEWAL (OUTPATIENT)
Age: 55
End: 2020-11-03

## 2020-11-13 ENCOUNTER — RX RENEWAL (OUTPATIENT)
Age: 55
End: 2020-11-13

## 2020-11-16 ENCOUNTER — RX RENEWAL (OUTPATIENT)
Age: 55
End: 2020-11-16

## 2020-11-18 ENCOUNTER — RX RENEWAL (OUTPATIENT)
Age: 55
End: 2020-11-18

## 2020-11-20 ENCOUNTER — RX RENEWAL (OUTPATIENT)
Age: 55
End: 2020-11-20

## 2020-11-24 ENCOUNTER — RX RENEWAL (OUTPATIENT)
Age: 55
End: 2020-11-24

## 2020-11-24 RX ORDER — BLOOD-GLUCOSE METER
32G X 4 MM EACH MISCELLANEOUS
Qty: 100 | Refills: 1 | Status: ACTIVE | COMMUNITY
Start: 2020-07-27 | End: 1900-01-01

## 2020-11-24 RX ORDER — BLOOD-GLUCOSE METER
W/DEVICE EACH MISCELLANEOUS
Qty: 1 | Refills: 0 | Status: ACTIVE | COMMUNITY
Start: 2019-12-03 | End: 1900-01-01

## 2020-11-24 RX ORDER — INSULIN LISPRO 100 [IU]/ML
100 INJECTION, SOLUTION INTRAVENOUS; SUBCUTANEOUS
Qty: 1 | Refills: 1 | Status: ACTIVE | COMMUNITY
Start: 2020-02-18 | End: 1900-01-01

## 2020-12-21 ENCOUNTER — RX RENEWAL (OUTPATIENT)
Age: 55
End: 2020-12-21

## 2021-02-01 ENCOUNTER — APPOINTMENT (OUTPATIENT)
Dept: ENDOCRINOLOGY | Facility: CLINIC | Age: 56
End: 2021-02-01

## 2021-06-14 ENCOUNTER — RESULT CHARGE (OUTPATIENT)
Age: 56
End: 2021-06-14

## 2021-06-15 ENCOUNTER — APPOINTMENT (OUTPATIENT)
Dept: ENDOCRINOLOGY | Facility: CLINIC | Age: 56
End: 2021-06-15
Payer: MEDICAID

## 2021-06-15 VITALS
DIASTOLIC BLOOD PRESSURE: 80 MMHG | WEIGHT: 254 LBS | HEART RATE: 85 BPM | BODY MASS INDEX: 39.87 KG/M2 | HEIGHT: 67 IN | SYSTOLIC BLOOD PRESSURE: 120 MMHG | OXYGEN SATURATION: 98 % | TEMPERATURE: 97.8 F

## 2021-06-15 LAB — GLUCOSE BLDC GLUCOMTR-MCNC: 272

## 2021-06-15 PROCEDURE — 82962 GLUCOSE BLOOD TEST: CPT

## 2021-06-15 PROCEDURE — 99215 OFFICE O/P EST HI 40 MIN: CPT | Mod: 25

## 2021-06-18 ENCOUNTER — RESULT CHARGE (OUTPATIENT)
Age: 56
End: 2021-06-18

## 2021-06-18 LAB
25(OH)D3 SERPL-MCNC: 40.5 NG/ML
ALBUMIN SERPL ELPH-MCNC: 4.3 G/DL
ALP BLD-CCNC: 47 U/L
ALT SERPL-CCNC: 26 U/L
ANION GAP SERPL CALC-SCNC: 11 MMOL/L
AST SERPL-CCNC: 26 U/L
BILIRUB SERPL-MCNC: 0.5 MG/DL
BUN SERPL-MCNC: 10 MG/DL
C PEPTIDE SERPL-MCNC: 3.1 NG/ML
CALCIUM SERPL-MCNC: 9.5 MG/DL
CHLORIDE SERPL-SCNC: 102 MMOL/L
CHOLEST SERPL-MCNC: 170 MG/DL
CO2 SERPL-SCNC: 24 MMOL/L
CREAT SERPL-MCNC: 0.74 MG/DL
GLUCOSE BLDC GLUCOMTR-MCNC: 8
GLUCOSE SERPL-MCNC: 250 MG/DL
HBA1C MFR BLD HPLC: 8
HDLC SERPL-MCNC: 50 MG/DL
LDLC SERPL CALC-MCNC: 94 MG/DL
NONHDLC SERPL-MCNC: 120 MG/DL
POTASSIUM SERPL-SCNC: 4.3 MMOL/L
PROT SERPL-MCNC: 6.9 G/DL
SODIUM SERPL-SCNC: 137 MMOL/L
TRIGL SERPL-MCNC: 129 MG/DL
VIT B12 SERPL-MCNC: 505 PG/ML

## 2021-07-06 ENCOUNTER — RX RENEWAL (OUTPATIENT)
Age: 56
End: 2021-07-06

## 2021-07-06 RX ORDER — METFORMIN HYDROCHLORIDE 500 MG/1
500 TABLET, FILM COATED, EXTENDED RELEASE ORAL
Qty: 120 | Refills: 2 | Status: ACTIVE | COMMUNITY
Start: 2020-09-29 | End: 1900-01-01

## 2021-07-09 ENCOUNTER — NON-APPOINTMENT (OUTPATIENT)
Age: 56
End: 2021-07-09

## 2021-07-29 ENCOUNTER — APPOINTMENT (OUTPATIENT)
Dept: ENDOCRINOLOGY | Facility: CLINIC | Age: 56
End: 2021-07-29
Payer: MEDICAID

## 2021-07-29 ENCOUNTER — NON-APPOINTMENT (OUTPATIENT)
Age: 56
End: 2021-07-29

## 2021-07-29 VITALS — WEIGHT: 257.6 LBS | BODY MASS INDEX: 40.35 KG/M2

## 2021-07-29 PROCEDURE — G0108 DIAB MANAGE TRN  PER INDIV: CPT

## 2021-07-29 RX ORDER — DULAGLUTIDE 0.75 MG/.5ML
0.75 INJECTION, SOLUTION SUBCUTANEOUS
Qty: 1 | Refills: 0 | Status: DISCONTINUED | COMMUNITY
Start: 2021-07-29 | End: 2021-07-29

## 2021-08-25 ENCOUNTER — NON-APPOINTMENT (OUTPATIENT)
Age: 56
End: 2021-08-25

## 2021-09-21 ENCOUNTER — RX RENEWAL (OUTPATIENT)
Age: 56
End: 2021-09-21

## 2021-09-22 ENCOUNTER — RX RENEWAL (OUTPATIENT)
Age: 56
End: 2021-09-22

## 2021-10-11 ENCOUNTER — APPOINTMENT (OUTPATIENT)
Dept: ENDOCRINOLOGY | Facility: CLINIC | Age: 56
End: 2021-10-11
Payer: MEDICAID

## 2021-10-11 VITALS
BODY MASS INDEX: 39.08 KG/M2 | OXYGEN SATURATION: 98 % | SYSTOLIC BLOOD PRESSURE: 120 MMHG | TEMPERATURE: 98 F | WEIGHT: 249 LBS | DIASTOLIC BLOOD PRESSURE: 74 MMHG | HEART RATE: 89 BPM | HEIGHT: 67 IN

## 2021-10-11 DIAGNOSIS — Z23 ENCOUNTER FOR IMMUNIZATION: ICD-10-CM

## 2021-10-11 LAB
GLUCOSE BLDC GLUCOMTR-MCNC: 166
HBA1C MFR BLD HPLC: 7.3

## 2021-10-11 PROCEDURE — G0008: CPT

## 2021-10-11 PROCEDURE — 99214 OFFICE O/P EST MOD 30 MIN: CPT | Mod: 25

## 2021-10-11 PROCEDURE — 83036 HEMOGLOBIN GLYCOSYLATED A1C: CPT | Mod: QW

## 2021-10-11 PROCEDURE — 82962 GLUCOSE BLOOD TEST: CPT

## 2021-10-11 PROCEDURE — 90686 IIV4 VACC NO PRSV 0.5 ML IM: CPT

## 2021-10-11 RX ORDER — PEN NEEDLE, DIABETIC 32GX 5/32"
32G X 4 MM NEEDLE, DISPOSABLE MISCELLANEOUS
Qty: 100 | Refills: 3 | Status: ACTIVE | COMMUNITY
Start: 2020-02-06 | End: 1900-01-01

## 2021-10-11 RX ORDER — ELECTROLYTES/DEXTROSE
32G X 4 MM SOLUTION, ORAL ORAL
Qty: 1 | Refills: 0 | Status: ACTIVE | COMMUNITY
Start: 2019-10-30 | End: 1900-01-01

## 2021-10-11 RX ORDER — LANCING DEVICE
EACH MISCELLANEOUS 4 TIMES DAILY
Qty: 1 | Refills: 5 | Status: ACTIVE | COMMUNITY
Start: 2019-12-03 | End: 1900-01-01

## 2021-10-11 RX ORDER — LANCETS 28 GAUGE
EACH MISCELLANEOUS
Qty: 100 | Refills: 1 | Status: ACTIVE | COMMUNITY
Start: 2020-08-25 | End: 1900-01-01

## 2021-10-11 RX ORDER — CHOLECALCIFEROL (VITAMIN D3) 10(400)/ML
DROPS ORAL
Qty: 1 | Refills: 5 | Status: ACTIVE | COMMUNITY
Start: 2019-12-03 | End: 1900-01-01

## 2021-10-11 RX ORDER — PEN NEEDLE, DIABETIC 29 G X1/2"
29G X 12.7MM NEEDLE, DISPOSABLE MISCELLANEOUS
Qty: 100 | Refills: 0 | Status: ACTIVE | COMMUNITY
Start: 2020-07-06 | End: 1900-01-01

## 2021-12-03 ENCOUNTER — RX RENEWAL (OUTPATIENT)
Age: 56
End: 2021-12-03

## 2021-12-30 ENCOUNTER — RX RENEWAL (OUTPATIENT)
Age: 56
End: 2021-12-30

## 2021-12-30 RX ORDER — PEN NEEDLE, DIABETIC 32GX 5/32"
32G X 4 MM NEEDLE, DISPOSABLE MISCELLANEOUS
Qty: 100 | Refills: 0 | Status: ACTIVE | COMMUNITY
Start: 2021-12-30 | End: 1900-01-01

## 2022-01-14 NOTE — ED PROVIDER NOTE - BIRTH SEX
Patient returned call. Notified patient that MRI of right hip shows a femoral neck fracture with surrounding bone marrow edema. She needs to be non weight bearing on the right leg and use crutches or a walker. She has both. Educated her that there is a risk of progression of the fracture with continued weight bearing. This is something we will need to fix surgically like her left hip. The tentative plan is next Wed 1/19. I will call patient back with more details once this has been confirmed. Patient verbalized understanding.    Patient has never had a DEXA scan. She will need to have this done in the near future due to recent history of bilateral femoral neck fractures following a low impact fall/misstep.   Male

## 2022-02-22 ENCOUNTER — APPOINTMENT (OUTPATIENT)
Dept: ENDOCRINOLOGY | Facility: CLINIC | Age: 57
End: 2022-02-22
Payer: MEDICAID

## 2022-02-22 VITALS
BODY MASS INDEX: 38.92 KG/M2 | HEART RATE: 90 BPM | HEIGHT: 67 IN | DIASTOLIC BLOOD PRESSURE: 86 MMHG | WEIGHT: 248 LBS | OXYGEN SATURATION: 97 % | TEMPERATURE: 97.4 F | SYSTOLIC BLOOD PRESSURE: 134 MMHG

## 2022-02-22 LAB — HBA1C MFR BLD HPLC: 7

## 2022-02-22 PROCEDURE — 99214 OFFICE O/P EST MOD 30 MIN: CPT | Mod: 25

## 2022-02-22 PROCEDURE — 83036 HEMOGLOBIN GLYCOSYLATED A1C: CPT | Mod: QW

## 2022-02-22 RX ORDER — DULAGLUTIDE 0.75 MG/.5ML
0.75 INJECTION, SOLUTION SUBCUTANEOUS
Qty: 4 | Refills: 3 | Status: DISCONTINUED | COMMUNITY
Start: 2020-10-14 | End: 2022-02-22

## 2022-02-24 LAB
ALBUMIN SERPL ELPH-MCNC: 4.4 G/DL
ALP BLD-CCNC: 46 U/L
ALT SERPL-CCNC: 32 U/L
ANION GAP SERPL CALC-SCNC: 16 MMOL/L
AST SERPL-CCNC: 25 U/L
BILIRUB SERPL-MCNC: 0.4 MG/DL
BUN SERPL-MCNC: 12 MG/DL
CALCIUM SERPL-MCNC: 9.4 MG/DL
CHLORIDE SERPL-SCNC: 102 MMOL/L
CHOLEST SERPL-MCNC: 187 MG/DL
CO2 SERPL-SCNC: 21 MMOL/L
CREAT SERPL-MCNC: 0.79 MG/DL
HDLC SERPL-MCNC: 57 MG/DL
LDLC SERPL CALC-MCNC: 105 MG/DL
NONHDLC SERPL-MCNC: 130 MG/DL
POTASSIUM SERPL-SCNC: 4.6 MMOL/L
PROT SERPL-MCNC: 7.1 G/DL
SODIUM SERPL-SCNC: 140 MMOL/L
TRIGL SERPL-MCNC: 126 MG/DL
TSH SERPL-ACNC: 1.59 UIU/ML
VIT B12 SERPL-MCNC: 472 PG/ML

## 2022-02-24 RX ORDER — SIMVASTATIN 5 MG/1
5 TABLET, FILM COATED ORAL
Qty: 90 | Refills: 1 | Status: DISCONTINUED | COMMUNITY
Start: 2021-01-20 | End: 2022-02-24

## 2022-03-01 ENCOUNTER — APPOINTMENT (OUTPATIENT)
Dept: ENDOCRINOLOGY | Facility: CLINIC | Age: 57
End: 2022-03-01

## 2022-03-18 ENCOUNTER — RX RENEWAL (OUTPATIENT)
Age: 57
End: 2022-03-18

## 2022-04-26 ENCOUNTER — APPOINTMENT (OUTPATIENT)
Dept: ENDOCRINOLOGY | Facility: CLINIC | Age: 57
End: 2022-04-26
Payer: MEDICAID

## 2022-04-26 VITALS — BODY MASS INDEX: 38.91 KG/M2 | WEIGHT: 248.4 LBS

## 2022-04-26 PROCEDURE — G0108 DIAB MANAGE TRN  PER INDIV: CPT

## 2022-05-18 ENCOUNTER — RX RENEWAL (OUTPATIENT)
Age: 57
End: 2022-05-18

## 2022-07-11 ENCOUNTER — NON-APPOINTMENT (OUTPATIENT)
Age: 57
End: 2022-07-11

## 2022-07-11 ENCOUNTER — RX RENEWAL (OUTPATIENT)
Age: 57
End: 2022-07-11

## 2022-07-11 RX ORDER — GLUCAGON 3 MG/1
3 POWDER NASAL
Qty: 1 | Refills: 0 | Status: ACTIVE | COMMUNITY
Start: 2022-02-22 | End: 1900-01-01

## 2022-08-02 ENCOUNTER — APPOINTMENT (OUTPATIENT)
Dept: ENDOCRINOLOGY | Facility: CLINIC | Age: 57
End: 2022-08-02

## 2022-08-02 VITALS
WEIGHT: 242 LBS | HEART RATE: 83 BPM | DIASTOLIC BLOOD PRESSURE: 76 MMHG | OXYGEN SATURATION: 99 % | TEMPERATURE: 97.5 F | BODY MASS INDEX: 37.98 KG/M2 | SYSTOLIC BLOOD PRESSURE: 124 MMHG | HEIGHT: 67 IN

## 2022-08-02 LAB
GLUCOSE BLDC GLUCOMTR-MCNC: 160
HBA1C MFR BLD HPLC: 7.2

## 2022-08-02 PROCEDURE — 82962 GLUCOSE BLOOD TEST: CPT

## 2022-08-02 PROCEDURE — 83036 HEMOGLOBIN GLYCOSYLATED A1C: CPT | Mod: QW

## 2022-08-02 PROCEDURE — 99215 OFFICE O/P EST HI 40 MIN: CPT | Mod: 25

## 2022-08-08 ENCOUNTER — RX RENEWAL (OUTPATIENT)
Age: 57
End: 2022-08-08

## 2022-08-10 LAB
25(OH)D3 SERPL-MCNC: 36.7 NG/ML
ALBUMIN SERPL ELPH-MCNC: 4.2 G/DL
ALP BLD-CCNC: 38 U/L
ALT SERPL-CCNC: 20 U/L
ANION GAP SERPL CALC-SCNC: 12 MMOL/L
AST SERPL-CCNC: 19 U/L
BILIRUB SERPL-MCNC: 0.6 MG/DL
BUN SERPL-MCNC: 10 MG/DL
C PEPTIDE SERPL-MCNC: 2.2 NG/ML
CALCIUM SERPL-MCNC: 9.8 MG/DL
CHLORIDE SERPL-SCNC: 103 MMOL/L
CHOLEST SERPL-MCNC: 109 MG/DL
CO2 SERPL-SCNC: 25 MMOL/L
CREAT SERPL-MCNC: 0.79 MG/DL
EGFR: 104 ML/MIN/1.73M2
HDLC SERPL-MCNC: 52 MG/DL
LACTATE BLDA-MCNC: 1.4 MMOL/L
LDLC SERPL CALC-MCNC: 41 MG/DL
NONHDLC SERPL-MCNC: 57 MG/DL
POTASSIUM SERPL-SCNC: 4.2 MMOL/L
PROT SERPL-MCNC: 7.1 G/DL
SODIUM SERPL-SCNC: 140 MMOL/L
TRIGL SERPL-MCNC: 82 MG/DL
TSH SERPL-ACNC: 1.6 UIU/ML
VIT B12 SERPL-MCNC: 573 PG/ML

## 2022-08-24 ENCOUNTER — RX RENEWAL (OUTPATIENT)
Age: 57
End: 2022-08-24

## 2022-10-19 ENCOUNTER — RX RENEWAL (OUTPATIENT)
Age: 57
End: 2022-10-19

## 2022-10-31 NOTE — ED CDU PROVIDER INITIAL DAY NOTE - PROGRESS NOTE DETAILS
Attending Attestation (For Attendings USE Only)... Pt seen by ENT and scoped bedside, airway without swelling. recommend NS bolus, pain control ( morphine), abx, decadron 10mg ( 8 hours after last dose) and will continue to follow.

## 2022-12-09 ENCOUNTER — RESULT CHARGE (OUTPATIENT)
Age: 57
End: 2022-12-09

## 2022-12-09 ENCOUNTER — APPOINTMENT (OUTPATIENT)
Dept: ENDOCRINOLOGY | Facility: CLINIC | Age: 57
End: 2022-12-09

## 2022-12-09 VITALS
OXYGEN SATURATION: 98 % | BODY MASS INDEX: 35.94 KG/M2 | HEIGHT: 67 IN | DIASTOLIC BLOOD PRESSURE: 70 MMHG | HEART RATE: 94 BPM | WEIGHT: 229 LBS | SYSTOLIC BLOOD PRESSURE: 122 MMHG

## 2022-12-09 PROCEDURE — 99214 OFFICE O/P EST MOD 30 MIN: CPT

## 2022-12-14 ENCOUNTER — RX RENEWAL (OUTPATIENT)
Age: 57
End: 2022-12-14

## 2022-12-29 LAB
ALBUMIN SERPL ELPH-MCNC: 4.4 G/DL
ALP BLD-CCNC: 39 U/L
ALT SERPL-CCNC: 12 U/L
ANION GAP SERPL CALC-SCNC: 11 MMOL/L
AST SERPL-CCNC: 16 U/L
BILIRUB SERPL-MCNC: 0.5 MG/DL
BUN SERPL-MCNC: 10 MG/DL
C PEPTIDE SERPL-MCNC: 4 NG/ML
CALCIUM SERPL-MCNC: 9.7 MG/DL
CHLORIDE SERPL-SCNC: 102 MMOL/L
CO2 SERPL-SCNC: 26 MMOL/L
CREAT SERPL-MCNC: 0.84 MG/DL
EGFR: 102 ML/MIN/1.73M2
HBA1C MFR BLD HPLC: 7.6
POTASSIUM SERPL-SCNC: 4.4 MMOL/L
PROT SERPL-MCNC: 7 G/DL
SODIUM SERPL-SCNC: 140 MMOL/L

## 2023-04-04 ENCOUNTER — RX RENEWAL (OUTPATIENT)
Age: 58
End: 2023-04-04

## 2023-04-04 RX ORDER — PEN NEEDLE, DIABETIC 29 G X1/2"
32G X 4 MM NEEDLE, DISPOSABLE MISCELLANEOUS
Qty: 100 | Refills: 0 | Status: ACTIVE | COMMUNITY
Start: 2022-07-11 | End: 1900-01-01

## 2023-04-05 ENCOUNTER — RX RENEWAL (OUTPATIENT)
Age: 58
End: 2023-04-05

## 2023-04-21 NOTE — PROGRESS NOTE ADULT - ATTENDING COMMENTS
Stop noé frazier  Take omeprazole every day  Add miralax    Diclofenac of knee
if wbc not improve tomorrow , will consult ID (house)
Tong Roach MD  Pager [phone]  After 5pm/weekends call [phone]
Tong Roach MD  Pager [phone]  After 5pm/weekends call [phone]
Tong Roach MD  Pager [phone]  After 5pm/weekends call [phone]
if wbc not improve tomorrow , will consult ID (house)
c/w Iv abx , vanco dose increased .
as per ID , stable to be d/c home on oral doxy and augmentin x 10 days more
c/w IV abx , ENT / ID following
if wbc not improve tomorrow , will consult ID (house)
Cassidy Bhatia (pager [phone])  On evenings and weekends, please call [phone] or page endocrine fellow on call.   Please note that this patient may be followed by different provider tomorrow. If no answer, contact endocrine fellow on call.
Cassidy Bhatia (pager [phone])  On evenings and weekends, please call [phone] or page endocrine fellow on call.   Please note that this patient may be followed by different provider tomorrow. If no answer, contact endocrine fellow on call.

## 2023-04-28 ENCOUNTER — APPOINTMENT (OUTPATIENT)
Dept: ENDOCRINOLOGY | Facility: CLINIC | Age: 58
End: 2023-04-28
Payer: MEDICAID

## 2023-04-28 VITALS
HEIGHT: 67 IN | BODY MASS INDEX: 33.43 KG/M2 | DIASTOLIC BLOOD PRESSURE: 70 MMHG | SYSTOLIC BLOOD PRESSURE: 124 MMHG | WEIGHT: 213 LBS | HEART RATE: 79 BPM | OXYGEN SATURATION: 98 %

## 2023-04-28 DIAGNOSIS — Z00.00 ENCOUNTER FOR GENERAL ADULT MEDICAL EXAMINATION W/OUT ABNORMAL FINDINGS: ICD-10-CM

## 2023-04-28 LAB — HBA1C MFR BLD HPLC: 7

## 2023-04-28 PROCEDURE — 83036 HEMOGLOBIN GLYCOSYLATED A1C: CPT | Mod: QW

## 2023-04-28 PROCEDURE — 99214 OFFICE O/P EST MOD 30 MIN: CPT | Mod: 25

## 2023-05-03 ENCOUNTER — NON-APPOINTMENT (OUTPATIENT)
Age: 58
End: 2023-05-03

## 2023-05-03 LAB
ALBUMIN SERPL ELPH-MCNC: 4.5 G/DL
ALP BLD-CCNC: 36 U/L
ALT SERPL-CCNC: 24 U/L
ANION GAP SERPL CALC-SCNC: 16 MMOL/L
AST SERPL-CCNC: 25 U/L
BILIRUB SERPL-MCNC: 0.8 MG/DL
BUN SERPL-MCNC: 12 MG/DL
C PEPTIDE SERPL-MCNC: 1.6 NG/ML
CALCIUM SERPL-MCNC: 9.9 MG/DL
CHLORIDE SERPL-SCNC: 102 MMOL/L
CHOLEST SERPL-MCNC: 137 MG/DL
CO2 SERPL-SCNC: 23 MMOL/L
CREAT SERPL-MCNC: 0.67 MG/DL
EGFR: 110 ML/MIN/1.73M2
HDLC SERPL-MCNC: 57 MG/DL
LDLC SERPL CALC-MCNC: 65 MG/DL
NONHDLC SERPL-MCNC: 80 MG/DL
POTASSIUM SERPL-SCNC: 4.3 MMOL/L
PROT SERPL-MCNC: 7.2 G/DL
SODIUM SERPL-SCNC: 141 MMOL/L
TRIGL SERPL-MCNC: 78 MG/DL
VIT B12 SERPL-MCNC: 987 PG/ML

## 2023-05-14 PROBLEM — Z00.00 ENCOUNTER FOR PREVENTIVE HEALTH EXAMINATION: Status: ACTIVE | Noted: 2019-10-24

## 2023-05-17 ENCOUNTER — NON-APPOINTMENT (OUTPATIENT)
Age: 58
End: 2023-05-17

## 2023-06-02 NOTE — PROGRESS NOTE ADULT - SUBJECTIVE AND OBJECTIVE BOX
Follow Up:  peritonsillar abscess and cellulitis    Interval History: pt afebrile but CT with worsening soft tissue edema    ROS:      All other systems negative    Constitutional: no fever, no chills  Head: no trauma  Eyes: no vision changes, no eye pain  ENT:  R face and neck edema with pain  Cardiovascular:  no chest pain, no palpitation  Respiratory:  no SOB, no cough  GI:  no abd pain, no vomiting, no diarrhea  urinary: no dysuria, no hematuria, no flank pain  musculoskeletal:  no joint pain, no joint swelling  skin:  no rash  neurology:  no headache, no seizure, no change in mental status  psych: no anxiety, no depression         Allergies  No Known Allergies        ANTIMICROBIALS:  piperacillin/tazobactam IVPB.. 3.375 every 8 hours  vancomycin  IVPB 1000 every 12 hours      OTHER MEDS:  acetaminophen   Tablet .. 650 milliGRAM(s) Oral every 6 hours PRN  dextrose 40% Gel 15 Gram(s) Oral once PRN  dextrose 5%. 1000 milliLiter(s) IV Continuous <Continuous>  dextrose 50% Injectable 12.5 Gram(s) IV Push once  dextrose 50% Injectable 25 Gram(s) IV Push once  dextrose 50% Injectable 25 Gram(s) IV Push once  glucagon  Injectable 1 milliGRAM(s) IntraMuscular once PRN  ibuprofen  Tablet. 600 milliGRAM(s) Oral once  influenza   Vaccine 0.5 milliLiter(s) IntraMuscular once  insulin glargine Injectable (LANTUS) 24 Unit(s) SubCutaneous at bedtime  insulin lispro (HumaLOG) corrective regimen sliding scale   SubCutaneous three times a day before meals  insulin lispro (HumaLOG) corrective regimen sliding scale   SubCutaneous at bedtime  insulin lispro Injectable (HumaLOG) 8 Unit(s) SubCutaneous three times a day before meals  morphine  - Injectable 4 milliGRAM(s) IV Push every 4 hours PRN  sodium chloride 0.9%. 1000 milliLiter(s) IV Continuous <Continuous>      Vital Signs Last 24 Hrs  T(C): 37.3 (28 Sep 2019 10:00), Max: 37.9 (27 Sep 2019 21:37)  T(F): 99.1 (28 Sep 2019 10:00), Max: 100.2 (27 Sep 2019 21:37)  HR: 104 (28 Sep 2019 10:00) (87 - 105)  BP: 148/92 (28 Sep 2019 10:00) (119/85 - 148/92)  BP(mean): --  RR: 20 (28 Sep 2019 10:00) (17 - 20)  SpO2: 100% (28 Sep 2019 10:00) (94% - 100%)    Physical Exam:  General:    NAD,  non toxic  Head: atraumatic, normocephalic  Eye: normal sclera and conjunctiva  ENT:  R face and neck edema, tenderness, unable to open the mouth folly  Cardio:     regular S1, S2,  no murmur  Respiratory:    clear b/l,    no wheezing  abd:     soft,   BS +,   no tenderness,    no organomegaly  :   no CVAT,  no suprapubic tenderness,   no  craig  Musculoskeletal:   no joint swelling,   no edema  vascular: no phlebitis, normal pulses  Skin:    no rash  Neurologic:     no focal deficit  psych: normal affect                          12.9   14.05 )-----------( 217      ( 28 Sep 2019 06:35 )             38.4       09-28    135  |  98  |  18  ----------------------------<  301<H>  4.4   |  23  |  0.67    Ca    8.8      28 Sep 2019 06:35  Phos  3.4     09-28  Mg     1.8     09-28    TPro  6.1  /  Alb  2.7<L>  /  TBili  0.6  /  DBili  x   /  AST  9   /  ALT  11  /  AlkPhos  36<L>  09-27          MICROBIOLOGY:  Vancomycin Level, Trough: 4.0 ug/mL (09-28-19 @ 13:30)  v  BLOOD PERIPHERAL  09-24-19 --  --  --      OTHER  09-23-19 --  --  --      .Blood  09-23-19   No growth at 5 days.  --  --                RADIOLOGY:  Images below reviewed personally  < from: CT Maxillofacial w/ IV Cont (09.27.19 @ 17:01) >  Impression:    Worsening soft tissue inflammatory changes with a with decreased size of   right peritonsillar abscess.. Increasing fluid extending into the    and submandibular spaces with involvement of the right   pharyngeal wall and posterior nasal pharynx. Statement Selected

## 2023-07-25 ENCOUNTER — RX RENEWAL (OUTPATIENT)
Age: 58
End: 2023-07-25

## 2023-08-02 ENCOUNTER — APPOINTMENT (OUTPATIENT)
Dept: ENDOCRINOLOGY | Facility: CLINIC | Age: 58
End: 2023-08-02
Payer: MEDICAID

## 2023-08-02 VITALS
HEART RATE: 81 BPM | WEIGHT: 208 LBS | BODY MASS INDEX: 32.65 KG/M2 | SYSTOLIC BLOOD PRESSURE: 118 MMHG | DIASTOLIC BLOOD PRESSURE: 70 MMHG | HEIGHT: 67 IN | OXYGEN SATURATION: 98 %

## 2023-08-02 LAB — HBA1C MFR BLD HPLC: 7.6

## 2023-08-02 PROCEDURE — 83036 HEMOGLOBIN GLYCOSYLATED A1C: CPT | Mod: QW

## 2023-08-02 PROCEDURE — 99214 OFFICE O/P EST MOD 30 MIN: CPT | Mod: 25

## 2023-08-02 NOTE — REVIEW OF SYSTEMS
[As Noted in HPI] : as noted in HPI [Negative] : Heme/Lymph [All other systems negative] : All other systems negative [Fatigue] : no fatigue [Decreased Appetite] : appetite not decreased [Eye Pain] : no pain [Blurred Vision] : no blurred vision [Dysphagia] : no dysphagia [Neck Pain] : no neck pain [Dysphonia] : no dysphonia [Chest Pain] : no chest pain [Palpitations] : no palpitations [Shortness Of Breath] : no shortness of breath [Cough] : no cough [Nausea] : no nausea [Abdominal Pain] : no abdominal pain [Vomiting] : no vomiting [Polyuria] : no polyuria [Headaches] : no headaches [Tremors] : no tremors [Polydipsia] : no polydipsia [Swelling] : no swelling

## 2023-08-02 NOTE — PHYSICAL EXAM
[Alert] : alert [Well Nourished] : well nourished [No Acute Distress] : no acute distress [Well Developed] : well developed [Normal Sclera/Conjunctiva] : normal sclera/conjunctiva [No Proptosis] : no proptosis [Normal Hearing] : hearing was normal [Thyroid Not Enlarged] : the thyroid was not enlarged [No Thyroid Nodules] : no palpable thyroid nodules [No Respiratory Distress] : no respiratory distress [No Accessory Muscle Use] : no accessory muscle use [Clear to Auscultation] : lungs were clear to auscultation bilaterally [Normal S1, S2] : normal S1 and S2 [Normal Rate] : heart rate was normal [Regular Rhythm] : with a regular rhythm [No Edema] : no peripheral edema [Pedal Pulses Normal] : the pedal pulses are present [Normal Bowel Sounds] : normal bowel sounds [Not Tender] : non-tender [Not Distended] : not distended [Soft] : abdomen soft [Normal Gait] : normal gait [No Clubbing, Cyanosis] : no clubbing  or cyanosis of the fingernails [No Rash] : no rash [No Motor Deficits] : the motor exam was normal [No Tremors] : no tremors [Oriented x3] : oriented to person, place, and time [Normal Affect] : the affect was normal [Normal Insight/Judgement] : insight and judgment were intact [Normal Mood] : the mood was normal [Foot Ulcers] : no foot ulcers [de-identified] : thickened toenails in 1st toe and last toe on both feet - dec 2022

## 2023-08-02 NOTE — HISTORY OF PRESENT ILLNESS
[FreeTextEntry1] : 57 M h/o T2DM, HTN, HLD here for follow up visit.   No acute events since last visit   DM history: T2DM for 10 years. Patient was hospitalized in 2019 for peritonsillar abscess and with lipomas throughout, A1c was 12% then (states it was 17 previously), was seen by inpatient endocrine team. Was only following with PCP prior to that. He was on metformin at that time (Has tried glipizide in the past but was switched to metformin because it was not working well) which was held due to elevated lactate and he was discharged on basaglar 46 units SQ qHS and ademelog 14 units SQ TID.   ( i repeated lacate and it was normal in dec 2019 post hoslitlization and repeat also normal)   Current DM regimen:  a1c 7.6 dec 2022 basaglar down to 24 ozempic increase to 2mg  metformin 1000mg BID   he is loosing weight, 242lbs from 248 in 2022----->229lbs ---->213 lbs now   His a1c is 8 down from 8.7 in oct 2020 --> 7.3 oct 2021--->7.-->7.2022-->7.6 dec 2022--->7.  Checks FSG's at home 2 times a day  does not have meter  he states numbers are well controlled (<160 per the pt) no log or meter like at the last visit       Diet:  monitoring diet  has cut down carbs  has cut down rice and roti     Exercise: exercise daily  walking    Complications of diabetes:  Micro: denies retinopathy, nephropathy, neuropathy, gastroparesis, orthostasis, foot ulcers, onchymycosis Macro: denies myocardial infarction, stroke, peripheral vascular disease, carotid stenosis Hypoglycemic awareness: yes  Hx of DKA or hospitalizations: no hx of DKA  On glucocorticoids: none  S/p pancreatitis/pancreatectomy: none       HCM optho:  no DR  needs to go for  - d/w pt  No history of HTN and not on any medications, now on lipitor   PMH: Type 2 diabetes mellitus PSH: No significant past surgical history FH: type 2 diabetes father and mother SH: No hx of tobacco or etoh abuse. Works in construction.    jardiance 25 mg qd Basaglar 12 units qafternoon, wife gives injection so patient does not know the dose metformin 1000 mg BID  ozempic 2 mg (increased last visit)  simvastatin 20 mg qd- long time on it no bp issues.  fastin, not past 130 pre-dinner: <130s  No hypoglycemia.  weight: baseline 250 lbs, now 208 lbs   Ophtho- last visit 3 weeks ago, no DR Podiatry- denies neuropathy, no h/o foot ulcers or sores Kidney- eGFR 110, UACR negative (10/2020), denies UTI or yeast infections  Diet:  breakfast- 4 slices of ww bread, eggs lunch- plain coffee, fish cake, chicken julita dinner- 4 slices of ww bread, sardines, a lot of fish, egg, broccoli or cauliflower drink- tea with milk no sugar, water, no juice or soda snacks- none Exercise: during work in [employer]

## 2023-08-02 NOTE — ASSESSMENT
[Diabetes Foot Care] : diabetes foot care [Long Term Vascular Complications] : long term vascular complications of diabetes [Carbohydrate Consistent Diet] : carbohydrate consistent diet [Importance of Diet and Exercise] : importance of diet and exercise to improve glycemic control, achieve weight loss and improve cardiovascular health [Exercise/Effect on Glucose] : exercise/effect on glucose [Hypoglycemia Management] : hypoglycemia management [Action and use of Insulin] : action and use of short and long-acting insulin [Self Monitoring of Blood Glucose] : self monitoring of blood glucose [Retinopathy Screening] : Patient was referred to ophthalmology for retinopathy screening [FreeTextEntry1] : 57 M h/o uncontrolled T2DM, HTN, HLD.      #DM 8.0 june 2021 from  8.7%, was 12% in 9/2019-->A1c 7.3 oct 2021--->a1c 7.0 feb 2022--->7.2 august 2022-->7.6 dec 2022  Goal is 6.5   like last visit  i d.w pt to get us log and data with a goal to get off bolus insulin as we increase ozempic i do not have his meter   goal is to get off basal  basaglar down to 20--->12 ozempic 2mg  metformin 1000 BID  increase jardiance to 25 from 10 paul     he is motivated en"[employer]s music, kenneth lee, walking, help [place], feels well    - Discussed diagnosis and implications on cardiovascular disease, noted goals for each hemoglobin A1C, blood pressure and LDL cholesterol - Check hemoglobin A1C every 3 months, 6 months if stable  - BP goal - <130/80 - ACC/AHA recommendation for statin therapy: adults with LDL cholesterol better than 190, adults age 40 to 75 diabetes, and adults 40 to 75 with a 10 year risk of 7.5 or greater.  - Lengthy discussion on dietary choices, stressed lean protein low in saturated fats; increasing fiber (25-38 gm daily), and reducing processed foods - Ophthalmology annually -  urine microalbumin ratio, goal <30 mg/g (he is not on ACE/ARB)  - Denies neuropathy; daily feet checks/keep feet covered  - we discussed glycemic goals and need for close and routine followup  - we discussed importance of self monitoring of blood glucose and to call if glucose <70 or >400   RTC 3-4 month log in interim- pt knows to drop off the logs in interim and call with high/lows  Risks and benefits of incretin mimetic therapy were discussed with the patient including nausea,  pancreatitis and potential risk of medullary thyroid cancer. MTC seen in rats. no MEN in the family.  discussed with the patient if there is DR on exam  pt will have more frequent ophthalmology exam to follow diabetic retinopathy discussed that this is a potential side effect that seen with GLP-1 (not direct cause and effect but if worsening on GLP-1 will need to stop it, dw pt this is more due to  discussed DR risk with glp1  no MEN or MTC in family or pancreatitis  SE seen with SGLT2 inhibitor d/w pt this includes :  UTI, yeast infection, fourneir gangrene..to call us if these symtoms occurs, or pain out of proportion in the gential area.  based on empa-reg trial/ DAPA trial, pt will benefit from SGLT2 to help decrease HF hopsitlizations in future and help with CV outcome  - Discussed the risk of DKA and UTI.   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  Discussed that he/she should seek medical attention immediately if he/she experience any symptoms of tenderness, redness, or swelling of the genitals or the area from the genitals back to the rectum, and have a fever above 100.4 F or a general feeling of being unwell. These symptoms can worsen quickly, so it is important to seek treatment right away.

## 2023-09-25 LAB
CREAT SPEC-SCNC: 36 MG/DL
MICROALBUMIN 24H UR DL<=1MG/L-MCNC: <1.2 MG/DL
MICROALBUMIN/CREAT 24H UR-RTO: NORMAL MG/G

## 2023-10-06 ENCOUNTER — RX RENEWAL (OUTPATIENT)
Age: 58
End: 2023-10-06

## 2023-10-20 ENCOUNTER — RX RENEWAL (OUTPATIENT)
Age: 58
End: 2023-10-20

## 2023-11-07 ENCOUNTER — APPOINTMENT (OUTPATIENT)
Dept: ENDOCRINOLOGY | Facility: CLINIC | Age: 58
End: 2023-11-07
Payer: MEDICAID

## 2023-11-07 VITALS
WEIGHT: 209 LBS | DIASTOLIC BLOOD PRESSURE: 90 MMHG | SYSTOLIC BLOOD PRESSURE: 140 MMHG | HEIGHT: 67 IN | BODY MASS INDEX: 32.8 KG/M2 | HEART RATE: 94 BPM | OXYGEN SATURATION: 96 %

## 2023-11-07 PROCEDURE — 83036 HEMOGLOBIN GLYCOSYLATED A1C: CPT | Mod: QW

## 2023-11-07 PROCEDURE — 99214 OFFICE O/P EST MOD 30 MIN: CPT | Mod: 25

## 2023-11-13 LAB — HBA1C MFR BLD HPLC: 8

## 2023-11-13 RX ORDER — BLOOD SUGAR DIAGNOSTIC
STRIP MISCELLANEOUS 4 TIMES DAILY
Qty: 1 | Refills: 3 | Status: ACTIVE | COMMUNITY
Start: 2020-07-06 | End: 1900-01-01

## 2023-11-13 RX ORDER — LANCETS 30 GAUGE
EACH MISCELLANEOUS
Qty: 400 | Refills: 3 | Status: ACTIVE | COMMUNITY
Start: 2021-12-30 | End: 1900-01-01

## 2023-11-14 RX ORDER — BLOOD-GLUCOSE SENSOR
EACH MISCELLANEOUS
Qty: 2 | Refills: 11 | Status: ACTIVE | COMMUNITY
Start: 2023-11-14 | End: 1900-01-01

## 2023-11-21 ENCOUNTER — NON-APPOINTMENT (OUTPATIENT)
Age: 58
End: 2023-11-21

## 2023-11-22 ENCOUNTER — TRANSCRIPTION ENCOUNTER (OUTPATIENT)
Age: 58
End: 2023-11-22

## 2023-11-22 ENCOUNTER — OUTPATIENT (OUTPATIENT)
Dept: OUTPATIENT SERVICES | Facility: HOSPITAL | Age: 58
LOS: 1 days | End: 2023-11-22
Payer: MEDICAID

## 2023-11-22 VITALS
OXYGEN SATURATION: 96 % | RESPIRATION RATE: 18 BRPM | WEIGHT: 207.9 LBS | SYSTOLIC BLOOD PRESSURE: 125 MMHG | DIASTOLIC BLOOD PRESSURE: 75 MMHG | HEART RATE: 71 BPM | TEMPERATURE: 98 F | HEIGHT: 67 IN

## 2023-11-22 VITALS
SYSTOLIC BLOOD PRESSURE: 114 MMHG | OXYGEN SATURATION: 97 % | RESPIRATION RATE: 16 BRPM | DIASTOLIC BLOOD PRESSURE: 72 MMHG | HEART RATE: 72 BPM

## 2023-11-22 DIAGNOSIS — R07.89 OTHER CHEST PAIN: ICD-10-CM

## 2023-11-22 LAB
ANION GAP SERPL CALC-SCNC: 12 MMOL/L — SIGNIFICANT CHANGE UP (ref 5–17)
ANION GAP SERPL CALC-SCNC: 12 MMOL/L — SIGNIFICANT CHANGE UP (ref 5–17)
BUN SERPL-MCNC: 15 MG/DL — SIGNIFICANT CHANGE UP (ref 7–23)
BUN SERPL-MCNC: 15 MG/DL — SIGNIFICANT CHANGE UP (ref 7–23)
CALCIUM SERPL-MCNC: 9.5 MG/DL — SIGNIFICANT CHANGE UP (ref 8.4–10.5)
CALCIUM SERPL-MCNC: 9.5 MG/DL — SIGNIFICANT CHANGE UP (ref 8.4–10.5)
CHLORIDE SERPL-SCNC: 103 MMOL/L — SIGNIFICANT CHANGE UP (ref 96–108)
CHLORIDE SERPL-SCNC: 103 MMOL/L — SIGNIFICANT CHANGE UP (ref 96–108)
CO2 SERPL-SCNC: 23 MMOL/L — SIGNIFICANT CHANGE UP (ref 22–31)
CO2 SERPL-SCNC: 23 MMOL/L — SIGNIFICANT CHANGE UP (ref 22–31)
CREAT SERPL-MCNC: 0.71 MG/DL — SIGNIFICANT CHANGE UP (ref 0.5–1.3)
CREAT SERPL-MCNC: 0.71 MG/DL — SIGNIFICANT CHANGE UP (ref 0.5–1.3)
EGFR: 107 ML/MIN/1.73M2 — SIGNIFICANT CHANGE UP
EGFR: 107 ML/MIN/1.73M2 — SIGNIFICANT CHANGE UP
GLUCOSE BLDC GLUCOMTR-MCNC: 121 MG/DL — HIGH (ref 70–99)
GLUCOSE BLDC GLUCOMTR-MCNC: 121 MG/DL — HIGH (ref 70–99)
GLUCOSE BLDC GLUCOMTR-MCNC: 126 MG/DL — HIGH (ref 70–99)
GLUCOSE BLDC GLUCOMTR-MCNC: 126 MG/DL — HIGH (ref 70–99)
GLUCOSE SERPL-MCNC: 140 MG/DL — HIGH (ref 70–99)
GLUCOSE SERPL-MCNC: 140 MG/DL — HIGH (ref 70–99)
HCT VFR BLD CALC: 45.1 % — SIGNIFICANT CHANGE UP (ref 39–50)
HCT VFR BLD CALC: 45.1 % — SIGNIFICANT CHANGE UP (ref 39–50)
HGB BLD-MCNC: 14.7 G/DL — SIGNIFICANT CHANGE UP (ref 13–17)
HGB BLD-MCNC: 14.7 G/DL — SIGNIFICANT CHANGE UP (ref 13–17)
MCHC RBC-ENTMCNC: 27.1 PG — SIGNIFICANT CHANGE UP (ref 27–34)
MCHC RBC-ENTMCNC: 27.1 PG — SIGNIFICANT CHANGE UP (ref 27–34)
MCHC RBC-ENTMCNC: 32.6 GM/DL — SIGNIFICANT CHANGE UP (ref 32–36)
MCHC RBC-ENTMCNC: 32.6 GM/DL — SIGNIFICANT CHANGE UP (ref 32–36)
MCV RBC AUTO: 83.2 FL — SIGNIFICANT CHANGE UP (ref 80–100)
MCV RBC AUTO: 83.2 FL — SIGNIFICANT CHANGE UP (ref 80–100)
NRBC # BLD: 0 /100 WBCS — SIGNIFICANT CHANGE UP (ref 0–0)
NRBC # BLD: 0 /100 WBCS — SIGNIFICANT CHANGE UP (ref 0–0)
PLATELET # BLD AUTO: 183 K/UL — SIGNIFICANT CHANGE UP (ref 150–400)
PLATELET # BLD AUTO: 183 K/UL — SIGNIFICANT CHANGE UP (ref 150–400)
POTASSIUM SERPL-MCNC: 4.2 MMOL/L — SIGNIFICANT CHANGE UP (ref 3.5–5.3)
POTASSIUM SERPL-MCNC: 4.2 MMOL/L — SIGNIFICANT CHANGE UP (ref 3.5–5.3)
POTASSIUM SERPL-SCNC: 4.2 MMOL/L — SIGNIFICANT CHANGE UP (ref 3.5–5.3)
POTASSIUM SERPL-SCNC: 4.2 MMOL/L — SIGNIFICANT CHANGE UP (ref 3.5–5.3)
RBC # BLD: 5.42 M/UL — SIGNIFICANT CHANGE UP (ref 4.2–5.8)
RBC # BLD: 5.42 M/UL — SIGNIFICANT CHANGE UP (ref 4.2–5.8)
RBC # FLD: 15 % — HIGH (ref 10.3–14.5)
RBC # FLD: 15 % — HIGH (ref 10.3–14.5)
SODIUM SERPL-SCNC: 138 MMOL/L — SIGNIFICANT CHANGE UP (ref 135–145)
SODIUM SERPL-SCNC: 138 MMOL/L — SIGNIFICANT CHANGE UP (ref 135–145)
WBC # BLD: 7.76 K/UL — SIGNIFICANT CHANGE UP (ref 3.8–10.5)
WBC # BLD: 7.76 K/UL — SIGNIFICANT CHANGE UP (ref 3.8–10.5)
WBC # FLD AUTO: 7.76 K/UL — SIGNIFICANT CHANGE UP (ref 3.8–10.5)
WBC # FLD AUTO: 7.76 K/UL — SIGNIFICANT CHANGE UP (ref 3.8–10.5)

## 2023-11-22 PROCEDURE — C1769: CPT

## 2023-11-22 PROCEDURE — 93005 ELECTROCARDIOGRAM TRACING: CPT

## 2023-11-22 PROCEDURE — C1887: CPT

## 2023-11-22 PROCEDURE — C1894: CPT

## 2023-11-22 PROCEDURE — 93458 L HRT ARTERY/VENTRICLE ANGIO: CPT | Mod: 26

## 2023-11-22 PROCEDURE — 93458 L HRT ARTERY/VENTRICLE ANGIO: CPT

## 2023-11-22 PROCEDURE — 93010 ELECTROCARDIOGRAM REPORT: CPT

## 2023-11-22 PROCEDURE — 85027 COMPLETE CBC AUTOMATED: CPT

## 2023-11-22 PROCEDURE — 99152 MOD SED SAME PHYS/QHP 5/>YRS: CPT

## 2023-11-22 PROCEDURE — 36415 COLL VENOUS BLD VENIPUNCTURE: CPT

## 2023-11-22 PROCEDURE — 80048 BASIC METABOLIC PNL TOTAL CA: CPT

## 2023-11-22 PROCEDURE — 82962 GLUCOSE BLOOD TEST: CPT

## 2023-11-22 RX ORDER — ATORVASTATIN CALCIUM 80 MG/1
1 TABLET, FILM COATED ORAL
Refills: 0 | DISCHARGE

## 2023-11-22 RX ORDER — ASPIRIN/CALCIUM CARB/MAGNESIUM 324 MG
1 TABLET ORAL
Qty: 0 | Refills: 0 | DISCHARGE

## 2023-11-22 RX ORDER — EMPAGLIFLOZIN 10 MG/1
1 TABLET, FILM COATED ORAL
Refills: 0 | DISCHARGE

## 2023-11-22 RX ORDER — SIMVASTATIN 20 MG/1
1 TABLET, FILM COATED ORAL
Qty: 0 | Refills: 0 | DISCHARGE

## 2023-11-22 RX ORDER — METFORMIN HYDROCHLORIDE 850 MG/1
1 TABLET ORAL
Qty: 0 | Refills: 0 | DISCHARGE

## 2023-11-22 RX ORDER — SODIUM CHLORIDE 9 MG/ML
1000 INJECTION INTRAMUSCULAR; INTRAVENOUS; SUBCUTANEOUS
Refills: 0 | Status: DISCONTINUED | OUTPATIENT
Start: 2023-11-22 | End: 2023-12-06

## 2023-11-22 RX ORDER — SODIUM CHLORIDE 9 MG/ML
250 INJECTION INTRAMUSCULAR; INTRAVENOUS; SUBCUTANEOUS ONCE
Refills: 0 | Status: DISCONTINUED | OUTPATIENT
Start: 2023-11-22 | End: 2023-12-06

## 2023-11-22 NOTE — ASU DISCHARGE PLAN (ADULT/PEDIATRIC) - ASU DC SPECIAL INSTRUCTIONSFT

## 2023-11-22 NOTE — H&P CARDIOLOGY - HISTORY OF PRESENT ILLNESS
{\rtf1\vjguvk73303\ansi\msucwqu1945\ftnbj\uc1\deff0  {\fonttbl{\f0 \fnil Segoe UI;}{\f1 \fnil \fcharset0 Segoe UI;}{\f2 \fnil  New;}{\f3 \fnil Kiana;}}  {\colortbl ;\dlh122\rvbtf837\ekgt290 ;\red0\green0\blue0 ;\red0\green0\cwof316 ;\ind202\green0\xoch174 ;\vmz975\green0\blue0 ;\red0\green0\blue0 ;}  {\stylesheet{\f0\fs20 Normal;}{\cs1 Default Paragraph Font;}{\s2\snext0\f2\fs0\b0\ul0\uldb0\uld0\embo0\i0\v0\strike0\striked0\protect0\super0\sub\caps0\scaps0\cf2\cb1\chcbpat1\expnd0\expndtw0\isjvhqavdx932\dn0 Definition Term;}{\s3\snext0\f2\fs0\b0\ul0\uldb0\uld0\embo0\i0\v0\strike0\striked0\protect0\super0\sub\caps0\scaps0\cf2\cb1\chcbpat1\expnd0\expndtw0\ntgqnxalol661\dn0\li360   Definition List;}{\cs4\f2\fs0\b0\ul0\uldb0\uld0\embo0\i\v0\strike0\striked0\protect0\super0\sub\caps0\scaps0\cf2\cb1\chcbpat1\expnd0\expndtw0\zlypmwvmqn113\dn0 Definition;}{\s5\snext0\outlinelevel1\f2\fs48\b\ul0\uldb0\uld0\embo0\i0\v0\strike0\striked0\protect0\super0\sub\caps0\scaps0\cf2\cb1\chcbpat1\expnd0\expndtw0\ttspigdyys584\dn0\sb100\sa100\sl0\outlinelevel1\keepn   H1;}{\s6\snext0\outlinelevel2\f2\fs36\b\ul0\uldb0\uld0\embo0\i0\v0\strike0\striked0\protect0\super0\sub\caps0\scaps0\cf2\cb1\chcbpat1\expnd0\expndtw0\ptjbljzdss819\dn0\sb100\sa100\sl0\outlinelevel2\keepn H2;}{\s7\snext0\outlinelevel3\f2\fs28\b\ul0\uldb0\uld0\embo0\i0\v0\strike0\striked0\protect0\super0\sub\caps0\scaps0\cf2\cb1\chcbpat1\expnd0\expndtw0\jbhxoogyla797\dn0\sb100\sa100\sl0\outlinelevel3\keepn   H3;}{\s8\snext0\outlinelevel4\f2\fs24\b\ul0\uldb0\uld0\embo0\i0\v0\strike0\striked0\protect0\super0\sub\caps0\scaps0\cf2\cb1\chcbpat1\expnd0\expndtw0\xbbevoarmk846\dn0\sb100\sa100\sl0\outlinelevel4\keepn H4;}{\s9\snext0\outlinelevel5\f2\fs20\b\ul0\uldb0\uld0\embo0\i0\v0\strike0\striked0\protect0\super0\sub\caps0\scaps0\cf2\cb1\chcbpat1\expnd0\expndtw0\bktxfvkktm651\dn0\sb100\sa100\sl0\outlinelevel5\keepn   H5;}{\s10\snext0\outlinelevel6\f2\fs16\b\ul0\uldb0\uld0\embo0\i0\v0\strike0\striked0\protect0\super0\sub\caps0\scaps0\cf2\cb1\chcbpat1\expnd0\expndtw0\xspvdnglpq627\dn0\sb100\sa100\sl0\outlinelevel6\keepn H6;}{\s11\snext0\f2\fs0\b0\ul0\uldb0\uld0\embo0\i\v0\strike0\striked0\protect0\super0\sub\caps0\scaps0\cf2\cb1\chcbpat1\expnd0\expndtw0\juawarranv674\dn0   Address;}{\s12\snext0\f2\fs0\b0\ul0\uldb0\uld0\embo0\i0\v0\strike0\striked0\protect0\super0\sub\caps0\scaps0\cf2\cb1\chcbpat1\expnd0\expndtw0\amhjvnkxpp813\dn0\li360\ri360\sb100\sa100\sl0 Blockquote;}{\cs13\f2\fs0\b0\ul0\uldb0\uld0\embo0\i\v0\strike0\striked0\protect0\super0\sub\caps0\scaps0\cf2\cb1\chcbpat1\expnd0\expndtw0\iinnqehsah240\dn0   CITE;}{\cs14\f2\fs20\b0\ul0\uldb0\uld0\embo0\i0\v0\strike0\striked0\protect0\super0\sub\caps0\scaps0\cf2\cb1\chcbpat1\expnd0\expndtw0\jwwxvwakzr038\dn0 CODE;}{\cs15\f2\fs0\b0\ul0\uldb0\uld0\embo0\i\v0\strike0\striked0\protect0\super0\sub\caps0\scaps0\cf2\cb1\chcbpat1\expnd0\expndtw0\qlvifqncdd657\dn0   Emphasis;}{\cs16\f2\fs0\b0\ul\uldb0\uld0\embo0\i0\v0\strike0\striked0\protect0\super0\sub\caps0\scaps0\cf3\cb1\chcbpat1\expnd0\expndtw0\odggbjpkfd009\dn0 Hyperlink;}{\cs17\f2\fs0\b0\ul\uldb0\uld0\embo0\i0\v0\strike0\striked0\protect0\super0\sub\caps0\scaps0\cf4\cb1\chcbpat1\expnd0\expndtw0\zhvorbjldl032\dn0   FollowedHyperlink;}{\cs18\f2\fs20\b\ul0\uldb0\uld0\embo0\i0\v0\strike0\striked0\protect0\super0\sub\caps0\scaps0\cf2\cb1\chcbpat1\expnd0\expndtw0\vfwlvsdidw725\dn0 Keyboard;}{\s19\snext0\f2\fs20\b0\ul0\uldb0\uld0\embo0\i0\v0\strike0\striked0\protect0\super0\sub\caps0\scaps0\cf2\cb1\chcbpat1\expnd0\expndtw0\qfavjpblch389\dn0\tx0\tx959\po8538\oy7369\gq5772\gj7905\op4733\nx7332\df8230\zl1419\ga7109\sb0\sa0\sl0   Preformatted;}{\s20\snext0\f3\fs16\b0\ul0\uldb0\uld0\embo0\i0\v\strike0\striked0\protect0\super0\sub\caps0\scaps0\cf2\cb1\chcbpat1\expnd0\expndtw0\hybzetwmls915\dn0\brdrt\brdrdb\brdrcf2\qc z-Bottom of Form;}{\s21\snext0\f3\fs16\b0\ul0\uldb0\uld0\embo0\i0\v\strike0\striked0\protect0\super0\sub\caps0\scaps0\cf2\cb1\chcbpat1\expnd0\expndtw0\mrkvjukimj790\dn0\brdrb\brdrdb\brdrcf2\qc   z-Top of Form;}{\cs22\f2\fs0\b0\ul0\uldb0\uld0\embo0\i0\v0\strike0\striked0\protect0\super0\sub\caps0\scaps0\cf2\cb1\chcbpat1\expnd0\expndtw0\enizaxjrgb103\dn0 Sample;}{\cs23\f2\fs0\b\ul0\uldb0\uld0\embo0\i0\v0\strike0\striked0\protect0\super0\sub\caps0\scaps0\cf2\cb1\chcbpat1\expnd0\expndtw0\ujefwwonlw642\dn0   Strong;}{\cs24\f2\fs20\b0\ul0\uldb0\uld0\embo0\i0\v0\strike0\striked0\protect0\super0\sub\caps0\scaps0\cf2\cb1\chcbpat1\expnd0\expndtw0\ogvhfqtorm040\dn0 Typewriter;}{\cs25\f2\fs0\b0\ul0\uldb0\uld0\embo0\i\v0\strike0\striked0\protect0\super0\sub\caps0\scaps0\cf2\cb1\chcbpat1\expnd0\expndtw0\yijiixhefo885\dn0   Variable;}{\cs26\f2\fs0\b0\ul0\uldb0\uld0\embo0\i0\v\strike0\striked0\protect0\super0\sub\caps0\scaps0\cf5\cb1\chcbpat1\expnd0\expndtw0\ykamroxsdz375\dn0 HTML Markup;}{\cs27\f2\fs0\b0\ul0\uldb0\uld0\embo0\i0\v\strike0\striked0\protect0\super0\sub\caps0\scaps0\cf2\cb1\chcbpat1\expnd0\expndtw0\wykytbevtr487\dn0   Comment;}}  {\*\revtbl{Unknown;}}  {\*\listtable  {\list\listtemplateid0  {\listlevel\levelnfc0\levelfollow0\levelstartat1{\leveltext \'02\'00.}{\levelnumbers \'01}}  {\listlevel\levelnfc0\levelfollow0\levelstartat1{\leveltext \'02\'01.}{\levelnumbers \'01}}  {\listlevel\levelnfc0\levelfollow0\levelstartat1{\leveltext \'02\'02.}{\levelnumbers \'01}}  {\listlevel\levelnfc0\levelfollow0\levelstartat1{\leveltext \'02\'03.}{\levelnumbers \'01}}  {\listlevel\levelnfc0\levelfollow0\levelstartat1{\leveltext \'02\'04.}{\levelnumbers \'01}}  {\listlevel\levelnfc0\levelfollow0\levelstartat1{\leveltext \'02\'05.}{\levelnumbers \'01}}  {\listlevel\levelnfc0\levelfollow0\levelstartat1{\leveltext \'02\'06.}{\levelnumbers \'01}}  {\listlevel\levelnfc0\levelfollow0\levelstartat1{\leveltext \'02\'07.}{\levelnumbers \'01}}  {\listlevel\levelnfc0\levelfollow0\levelstartat0{\leveltext \'00}{\levelnumbers}}  {\listname ;}\listid1  }  }  {\*\listoverridetable}  \rklgrd90794\kfpdfg12763\nhaua4815\ecowc8492\qantx2751\jdius4310\vpcyoco277\tuceozb982\nogrowautofit\zumvyl119\formshade\nofeaturethrottle1\dntblnsbdb\fet4\aendnotes\aftnnrlc\pgbrdrhead\pgbrdrfoot  \sectd\svqexn18531\pqpnnf54739\guttersxn0\hijpophj0971\jxapcexc9819\xwfdoxxa1390\rcximlst7950\zqvpnoe186\ggwqwbz070\sbkpage\pgncont\pgndec  \plain\plain\f0\fs24\ql\plain\f0\fs24\plain\f1\fs20\fyep9993\hich\f1\dbch\f1\loch\f1\fs20 59 y/o Male with  h/o T2DM, on insulin ( unknown A1c),  HLD presents today for [facility] following an abnormal NST. Patient denies any \par  }   59 y/o Male with  h/o T2DM, on insulin ( unknown A1c),  HLD presents today for [facility] following an abnormal NST. Patient denies any chest pain, SOB, palpitations/ syncope. Pt was seen by Dr. Jennifer marques for routine evaluation, had a stress done done, which showed a small size, mild intensity, reversible inferolateral defect with EF of 81%. Now patient is here for elective LHC. Denies any implanted cardiac device.  59 y/o Male with  h/o T2DM, on insulin ( unknown A1c),  HLD presents today for [facility] following an abnormal NST. Patient denies any chest pain, SOB, palpitations/ syncope. Pt was seen by Dr. Jennifer marques for routine evaluation, had a stress done, which showed a small size, mild intensity, reversible inferolateral defect with EF of 81%. Now patient is here for elective LHC. Denies any implanted cardiac device.

## 2023-11-22 NOTE — ASU PATIENT PROFILE, ADULT - FALL HARM RISK - FALL HARM RISK
Left voicemail message requesting that the patient call the office back to schedule follow up appointment in early aug No indicators present

## 2023-11-22 NOTE — ASU DISCHARGE PLAN (ADULT/PEDIATRIC) - CARE PROVIDER_API CALL
Kaylyn Pena.  Cardiology  [address] [place] [address]  Phone: [phone]  Fax: [phone]  Established Patient  Follow Up Time:

## 2023-11-22 NOTE — H&P CARDIOLOGY - WEIGHT IN LBS
Hi,    Could you please enter a covid test order so patient can have done prior to sleep study?     Thank you!  
207.8

## 2023-11-22 NOTE — H&P CARDIOLOGY - DATE:
22-Nov-2023 Airway patent, Nasal mucosa clear. Mouth with normal mucosa. Throat has no vesicles, no oropharyngeal exudates and uvula is midline.

## 2023-11-22 NOTE — ASU DISCHARGE PLAN (ADULT/PEDIATRIC) - NS MD DC FALL RISK RISK
For information on Fall & Injury Prevention, visit: [email or web address] OR  [email or web address] OR  [email or web address]://www.cdc.gov/steadi/patient.html

## 2024-01-02 LAB
ALBUMIN SERPL ELPH-MCNC: 4.5 G/DL
ALP BLD-CCNC: 39 U/L
ALT SERPL-CCNC: 23 U/L
ANION GAP SERPL CALC-SCNC: 13 MMOL/L
AST SERPL-CCNC: 19 U/L
BILIRUB SERPL-MCNC: 0.6 MG/DL
BUN SERPL-MCNC: 21 MG/DL
CALCIUM SERPL-MCNC: 9.7 MG/DL
CHLORIDE SERPL-SCNC: 103 MMOL/L
CHOLEST SERPL-MCNC: 138 MG/DL
CO2 SERPL-SCNC: 26 MMOL/L
CREAT SERPL-MCNC: 0.89 MG/DL
EGFR: 100 ML/MIN/1.73M2
GLUCOSE SERPL-MCNC: 222 MG/DL
HDLC SERPL-MCNC: 60 MG/DL
LDLC SERPL CALC-MCNC: 54 MG/DL
NONHDLC SERPL-MCNC: 78 MG/DL
POTASSIUM SERPL-SCNC: 4.2 MMOL/L
PROT SERPL-MCNC: 7 G/DL
SODIUM SERPL-SCNC: 141 MMOL/L
TRIGL SERPL-MCNC: 136 MG/DL
TSH SERPL-ACNC: 1.06 UIU/ML

## 2024-01-23 ENCOUNTER — RX RENEWAL (OUTPATIENT)
Age: 59
End: 2024-01-23

## 2024-03-04 PROBLEM — E78.5 HYPERLIPIDEMIA, UNSPECIFIED: Chronic | Status: ACTIVE | Noted: 2023-11-22

## 2024-03-22 ENCOUNTER — APPOINTMENT (OUTPATIENT)
Dept: ENDOCRINOLOGY | Facility: CLINIC | Age: 59
End: 2024-03-22

## 2024-04-16 ENCOUNTER — RX RENEWAL (OUTPATIENT)
Age: 59
End: 2024-04-16

## 2024-05-24 RX ORDER — INSULIN GLARGINE 100 [IU]/ML
100 INJECTION, SOLUTION SUBCUTANEOUS
Qty: 15 | Refills: 1 | Status: ACTIVE | COMMUNITY
Start: 2019-10-30 | End: 1900-01-01

## 2024-06-28 ENCOUNTER — APPOINTMENT (OUTPATIENT)
Dept: ENDOCRINOLOGY | Facility: CLINIC | Age: 59
End: 2024-06-28
Payer: MEDICAID

## 2024-06-28 VITALS
OXYGEN SATURATION: 97 % | BODY MASS INDEX: 31.71 KG/M2 | WEIGHT: 202 LBS | DIASTOLIC BLOOD PRESSURE: 72 MMHG | HEART RATE: 88 BPM | SYSTOLIC BLOOD PRESSURE: 122 MMHG | HEIGHT: 67 IN

## 2024-06-28 DIAGNOSIS — E78.5 HYPERLIPIDEMIA, UNSPECIFIED: ICD-10-CM

## 2024-06-28 DIAGNOSIS — E11.9 TYPE 2 DIABETES MELLITUS W/OUT COMPLICATIONS: ICD-10-CM

## 2024-06-28 DIAGNOSIS — I10 ESSENTIAL (PRIMARY) HYPERTENSION: ICD-10-CM

## 2024-06-28 LAB — HBA1C MFR BLD HPLC: 7.9

## 2024-06-28 PROCEDURE — G2211 COMPLEX E/M VISIT ADD ON: CPT | Mod: NC,1L

## 2024-06-28 PROCEDURE — 99215 OFFICE O/P EST HI 40 MIN: CPT

## 2024-06-28 PROCEDURE — 83036 HEMOGLOBIN GLYCOSYLATED A1C: CPT | Mod: QW

## 2024-07-01 ENCOUNTER — NON-APPOINTMENT (OUTPATIENT)
Age: 59
End: 2024-07-01

## 2024-07-01 PROBLEM — E78.5 HLD (HYPERLIPIDEMIA): Status: ACTIVE | Noted: 2020-10-18

## 2024-07-01 PROBLEM — I10 BENIGN ESSENTIAL HTN: Status: ACTIVE | Noted: 2024-07-01

## 2024-07-01 LAB
ALBUMIN SERPL ELPH-MCNC: 4.4 G/DL
ALP BLD-CCNC: 46 U/L
ALT SERPL-CCNC: 24 U/L
ANION GAP SERPL CALC-SCNC: 12 MMOL/L
AST SERPL-CCNC: 19 U/L
BILIRUB SERPL-MCNC: 0.4 MG/DL
BUN SERPL-MCNC: 19 MG/DL
CALCIUM SERPL-MCNC: 9.7 MG/DL
CHLORIDE SERPL-SCNC: 103 MMOL/L
CHOLEST SERPL-MCNC: 160 MG/DL
CO2 SERPL-SCNC: 25 MMOL/L
CREAT SERPL-MCNC: 0.76 MG/DL
CREAT SPEC-SCNC: 33 MG/DL
EGFR: 105 ML/MIN/1.73M2
GLUCOSE SERPL-MCNC: 264 MG/DL
HDLC SERPL-MCNC: 58 MG/DL
LDLC SERPL CALC-MCNC: 80 MG/DL
MICROALBUMIN 24H UR DL<=1MG/L-MCNC: <1.2 MG/DL
MICROALBUMIN/CREAT 24H UR-RTO: NORMAL MG/G
NONHDLC SERPL-MCNC: 102 MG/DL
POTASSIUM SERPL-SCNC: 4.4 MMOL/L
PROT SERPL-MCNC: 7.1 G/DL
SODIUM SERPL-SCNC: 140 MMOL/L
TRIGL SERPL-MCNC: 127 MG/DL
TSH SERPL-ACNC: 1.4 UIU/ML
VIT B12 SERPL-MCNC: 850 PG/ML

## 2024-07-11 ENCOUNTER — APPOINTMENT (OUTPATIENT)
Dept: ENDOCRINOLOGY | Facility: CLINIC | Age: 59
End: 2024-07-11

## 2024-07-16 ENCOUNTER — NON-APPOINTMENT (OUTPATIENT)
Age: 59
End: 2024-07-16

## 2024-07-29 ENCOUNTER — APPOINTMENT (OUTPATIENT)
Dept: ENDOCRINOLOGY | Facility: CLINIC | Age: 59
End: 2024-07-29
Payer: MEDICAID

## 2024-07-29 ENCOUNTER — RESULT CHARGE (OUTPATIENT)
Age: 59
End: 2024-07-29

## 2024-07-29 VITALS — WEIGHT: 210 LBS | BODY MASS INDEX: 32.96 KG/M2 | HEIGHT: 67 IN

## 2024-07-29 LAB — GLUCOSE BLDC GLUCOMTR-MCNC: 235

## 2024-07-29 PROCEDURE — G0108 DIAB MANAGE TRN  PER INDIV: CPT

## 2024-09-16 ENCOUNTER — RX RENEWAL (OUTPATIENT)
Age: 59
End: 2024-09-16

## 2024-09-18 NOTE — H&P ADULT - PROBLEM SELECTOR PROBLEM 1

## 2024-09-25 ENCOUNTER — APPOINTMENT (OUTPATIENT)
Dept: ENDOCRINOLOGY | Facility: CLINIC | Age: 59
End: 2024-09-25

## 2024-10-08 ENCOUNTER — RX RENEWAL (OUTPATIENT)
Age: 59
End: 2024-10-08

## 2024-10-08 RX ORDER — LANCETS 33 GAUGE
EACH MISCELLANEOUS
Qty: 400 | Refills: 0 | Status: ACTIVE | COMMUNITY
Start: 2024-10-08 | End: 1900-01-01

## 2025-01-13 ENCOUNTER — RX RENEWAL (OUTPATIENT)
Age: 60
End: 2025-01-13

## 2025-01-30 ENCOUNTER — APPOINTMENT (OUTPATIENT)
Dept: ENDOCRINOLOGY | Facility: CLINIC | Age: 60
End: 2025-01-30
Payer: MEDICAID

## 2025-01-30 ENCOUNTER — NON-APPOINTMENT (OUTPATIENT)
Age: 60
End: 2025-01-30

## 2025-01-30 VITALS
SYSTOLIC BLOOD PRESSURE: 118 MMHG | DIASTOLIC BLOOD PRESSURE: 66 MMHG | WEIGHT: 208 LBS | HEIGHT: 67 IN | HEART RATE: 89 BPM | BODY MASS INDEX: 32.65 KG/M2 | OXYGEN SATURATION: 98 %

## 2025-01-30 DIAGNOSIS — E11.9 TYPE 2 DIABETES MELLITUS W/OUT COMPLICATIONS: ICD-10-CM

## 2025-01-30 DIAGNOSIS — I10 ESSENTIAL (PRIMARY) HYPERTENSION: ICD-10-CM

## 2025-01-30 DIAGNOSIS — E78.5 HYPERLIPIDEMIA, UNSPECIFIED: ICD-10-CM

## 2025-01-30 LAB — HBA1C MFR BLD HPLC: 8.5

## 2025-01-30 PROCEDURE — 83036 HEMOGLOBIN GLYCOSYLATED A1C: CPT | Mod: QW

## 2025-01-30 PROCEDURE — 99215 OFFICE O/P EST HI 40 MIN: CPT

## 2025-01-30 PROCEDURE — G2211 COMPLEX E/M VISIT ADD ON: CPT | Mod: NC

## 2025-01-31 LAB
ALBUMIN SERPL ELPH-MCNC: 4.6 G/DL
ALP BLD-CCNC: 41 U/L
ALT SERPL-CCNC: 23 U/L
ANION GAP SERPL CALC-SCNC: 12 MMOL/L
AST SERPL-CCNC: 19 U/L
BILIRUB SERPL-MCNC: 0.6 MG/DL
BUN SERPL-MCNC: 24 MG/DL
C PEPTIDE SERPL-MCNC: 2.1 NG/ML
CALCIUM SERPL-MCNC: 10.3 MG/DL
CHLORIDE SERPL-SCNC: 104 MMOL/L
CHOLEST SERPL-MCNC: 158 MG/DL
CO2 SERPL-SCNC: 26 MMOL/L
CREAT SERPL-MCNC: 0.86 MG/DL
CREAT SPEC-SCNC: 61 MG/DL
EGFR: 100 ML/MIN/1.73M2
HDLC SERPL-MCNC: 63 MG/DL
LDLC SERPL CALC-MCNC: 78 MG/DL
MICROALBUMIN 24H UR DL<=1MG/L-MCNC: <1.2 MG/DL
MICROALBUMIN/CREAT 24H UR-RTO: NORMAL MG/G
NONHDLC SERPL-MCNC: 95 MG/DL
POTASSIUM SERPL-SCNC: 4.6 MMOL/L
PROT SERPL-MCNC: 7.4 G/DL
SODIUM SERPL-SCNC: 142 MMOL/L
TRIGL SERPL-MCNC: 88 MG/DL
TSH SERPL-ACNC: 1.1 UIU/ML
VIT B12 SERPL-MCNC: 997 PG/ML

## 2025-02-25 ENCOUNTER — APPOINTMENT (OUTPATIENT)
Dept: ENDOCRINOLOGY | Facility: CLINIC | Age: 60
End: 2025-02-25
Payer: MEDICAID

## 2025-02-25 PROCEDURE — G0108 DIAB MANAGE TRN  PER INDIV: CPT

## 2025-02-25 RX ORDER — BLOOD SUGAR DIAGNOSTIC
STRIP MISCELLANEOUS
Qty: 3 | Refills: 3 | Status: ACTIVE | COMMUNITY
Start: 2025-02-25 | End: 1900-01-01

## 2025-02-25 RX ORDER — BLOOD-GLUCOSE,RECEIVER,CONT
EACH MISCELLANEOUS
Qty: 1 | Refills: 0 | Status: ACTIVE | COMMUNITY
Start: 2025-02-25 | End: 1900-01-01

## 2025-04-02 ENCOUNTER — APPOINTMENT (OUTPATIENT)
Dept: ENDOCRINOLOGY | Facility: CLINIC | Age: 60
End: 2025-04-02

## 2025-04-14 ENCOUNTER — RX RENEWAL (OUTPATIENT)
Age: 60
End: 2025-04-14

## 2025-07-08 NOTE — H&P ADULT - PROBLEM SELECTOR PROBLEM 6
Patient's blood pressure range in the last 24 hours was: BP  Min: 106/50  Max: 136/63.The patient's inpatient anti-hypertensive regimen is listed below:  Current Antihypertensives  hydrALAZINE injection 10 mg, Every 6 hours PRN, Intravenous  metoprolol succinate (TOPROL-XL) 24 hr tablet 100 mg, 2 times daily, Oral    Plan  - BP is controlled, no changes needed to their regimen    7/7/25 d/c valsartan    Transition of care performed with sharing of clinical summary

## 2025-07-24 ENCOUNTER — RX RENEWAL (OUTPATIENT)
Age: 60
End: 2025-07-24

## 2025-08-15 ENCOUNTER — APPOINTMENT (OUTPATIENT)
Dept: ENDOCRINOLOGY | Facility: CLINIC | Age: 60
End: 2025-08-15
Payer: MEDICAID

## 2025-08-15 VITALS
SYSTOLIC BLOOD PRESSURE: 122 MMHG | BODY MASS INDEX: 31.95 KG/M2 | OXYGEN SATURATION: 98 % | WEIGHT: 204 LBS | DIASTOLIC BLOOD PRESSURE: 68 MMHG | HEART RATE: 76 BPM

## 2025-08-15 DIAGNOSIS — I10 ESSENTIAL (PRIMARY) HYPERTENSION: ICD-10-CM

## 2025-08-15 DIAGNOSIS — E11.9 TYPE 2 DIABETES MELLITUS W/OUT COMPLICATIONS: ICD-10-CM

## 2025-08-15 DIAGNOSIS — E78.5 HYPERLIPIDEMIA, UNSPECIFIED: ICD-10-CM

## 2025-08-15 LAB
GLUCOSE BLDC GLUCOMTR-MCNC: 144
HBA1C MFR BLD HPLC: 8.4

## 2025-08-15 PROCEDURE — 82962 GLUCOSE BLOOD TEST: CPT

## 2025-08-15 PROCEDURE — 83036 HEMOGLOBIN GLYCOSYLATED A1C: CPT | Mod: QW

## 2025-08-15 PROCEDURE — 99214 OFFICE O/P EST MOD 30 MIN: CPT

## 2025-08-15 PROCEDURE — G2211 COMPLEX E/M VISIT ADD ON: CPT | Mod: NC

## 2025-08-28 ENCOUNTER — RX RENEWAL (OUTPATIENT)
Age: 60
End: 2025-08-28